# Patient Record
Sex: MALE | Race: WHITE | URBAN - METROPOLITAN AREA
[De-identification: names, ages, dates, MRNs, and addresses within clinical notes are randomized per-mention and may not be internally consistent; named-entity substitution may affect disease eponyms.]

---

## 2018-03-14 ENCOUNTER — APPOINTMENT (OUTPATIENT)
Dept: ULTRASOUND IMAGING | Facility: CLINIC | Age: 38
End: 2018-03-14
Attending: EMERGENCY MEDICINE
Payer: COMMERCIAL

## 2018-03-14 ENCOUNTER — HOSPITAL ENCOUNTER (OUTPATIENT)
Facility: CLINIC | Age: 38
Setting detail: OBSERVATION
Discharge: PSYCHIATRIC OR SUBSTANCE USE FACILITY | End: 2018-03-16
Attending: EMERGENCY MEDICINE | Admitting: HOSPITALIST
Payer: COMMERCIAL

## 2018-03-14 DIAGNOSIS — K82.8 GALLBLADDER SLUDGE: ICD-10-CM

## 2018-03-14 DIAGNOSIS — K76.6 PORTAL HYPERTENSION (H): ICD-10-CM

## 2018-03-14 DIAGNOSIS — E80.6 HYPERBILIRUBINEMIA: ICD-10-CM

## 2018-03-14 DIAGNOSIS — F10.239 ALCOHOL DEPENDENCE WITH WITHDRAWAL WITH COMPLICATION (H): ICD-10-CM

## 2018-03-14 DIAGNOSIS — K70.31 ALCOHOLIC CIRRHOSIS OF LIVER WITH ASCITES (H): Primary | ICD-10-CM

## 2018-03-14 DIAGNOSIS — F10.10 ALCOHOL ABUSE: ICD-10-CM

## 2018-03-14 LAB
ALBUMIN SERPL-MCNC: 2.9 G/DL (ref 3.4–5)
ALP SERPL-CCNC: 180 U/L (ref 40–150)
ALT SERPL W P-5'-P-CCNC: 59 U/L (ref 0–70)
AMPHETAMINES UR QL SCN: NEGATIVE
ANION GAP SERPL CALCULATED.3IONS-SCNC: 11 MMOL/L (ref 3–14)
AST SERPL W P-5'-P-CCNC: 211 U/L (ref 0–45)
BARBITURATES UR QL: NEGATIVE
BASOPHILS # BLD AUTO: 0.1 10E9/L (ref 0–0.2)
BASOPHILS NFR BLD AUTO: 0.7 %
BENZODIAZ UR QL: NEGATIVE
BILIRUB SERPL-MCNC: 14.4 MG/DL (ref 0.2–1.3)
BUN SERPL-MCNC: 4 MG/DL (ref 7–30)
CALCIUM SERPL-MCNC: 8.1 MG/DL (ref 8.5–10.1)
CANNABINOIDS UR QL SCN: NEGATIVE
CHLORIDE SERPL-SCNC: 106 MMOL/L (ref 94–109)
CO2 SERPL-SCNC: 23 MMOL/L (ref 20–32)
COCAINE UR QL: NEGATIVE
CREAT SERPL-MCNC: 0.54 MG/DL (ref 0.66–1.25)
DIFFERENTIAL METHOD BLD: ABNORMAL
EOSINOPHIL # BLD AUTO: 0.2 10E9/L (ref 0–0.7)
EOSINOPHIL NFR BLD AUTO: 1.8 %
ERYTHROCYTE [DISTWIDTH] IN BLOOD BY AUTOMATED COUNT: 16.2 % (ref 10–15)
ETHANOL UR QL SCN: POSITIVE
GFR SERPL CREATININE-BSD FRML MDRD: >90 ML/MIN/1.7M2
GLUCOSE SERPL-MCNC: 117 MG/DL (ref 70–99)
HCT VFR BLD AUTO: 35.8 % (ref 40–53)
HGB BLD-MCNC: 12 G/DL (ref 13.3–17.7)
IMM GRANULOCYTES # BLD: 0.1 10E9/L (ref 0–0.4)
IMM GRANULOCYTES NFR BLD: 0.6 %
INR PPP: 1.78 (ref 0.86–1.14)
LIPASE SERPL-CCNC: 479 U/L (ref 73–393)
LYMPHOCYTES # BLD AUTO: 1 10E9/L (ref 0.8–5.3)
LYMPHOCYTES NFR BLD AUTO: 11.9 %
MCH RBC QN AUTO: 36.1 PG (ref 26.5–33)
MCHC RBC AUTO-ENTMCNC: 33.5 G/DL (ref 31.5–36.5)
MCV RBC AUTO: 108 FL (ref 78–100)
MONOCYTES # BLD AUTO: 0.9 10E9/L (ref 0–1.3)
MONOCYTES NFR BLD AUTO: 10.7 %
NEUTROPHILS # BLD AUTO: 6 10E9/L (ref 1.6–8.3)
NEUTROPHILS NFR BLD AUTO: 74.3 %
NRBC # BLD AUTO: 0 10*3/UL
NRBC BLD AUTO-RTO: 0 /100
OPIATES UR QL SCN: NEGATIVE
PLATELET # BLD AUTO: 79 10E9/L (ref 150–450)
POTASSIUM SERPL-SCNC: 4 MMOL/L (ref 3.4–5.3)
PROT SERPL-MCNC: 8.2 G/DL (ref 6.8–8.8)
RBC # BLD AUTO: 3.32 10E12/L (ref 4.4–5.9)
SODIUM SERPL-SCNC: 140 MMOL/L (ref 133–144)
WBC # BLD AUTO: 8.1 10E9/L (ref 4–11)

## 2018-03-14 PROCEDURE — 25000128 H RX IP 250 OP 636: Performed by: EMERGENCY MEDICINE

## 2018-03-14 PROCEDURE — 93975 VASCULAR STUDY: CPT | Mod: TC

## 2018-03-14 PROCEDURE — 83690 ASSAY OF LIPASE: CPT | Performed by: EMERGENCY MEDICINE

## 2018-03-14 PROCEDURE — 96360 HYDRATION IV INFUSION INIT: CPT | Performed by: EMERGENCY MEDICINE

## 2018-03-14 PROCEDURE — 96361 HYDRATE IV INFUSION ADD-ON: CPT | Performed by: EMERGENCY MEDICINE

## 2018-03-14 PROCEDURE — 80307 DRUG TEST PRSMV CHEM ANLYZR: CPT | Performed by: FAMILY MEDICINE

## 2018-03-14 PROCEDURE — 80053 COMPREHEN METABOLIC PANEL: CPT | Performed by: EMERGENCY MEDICINE

## 2018-03-14 PROCEDURE — 25000132 ZZH RX MED GY IP 250 OP 250 PS 637: Performed by: EMERGENCY MEDICINE

## 2018-03-14 PROCEDURE — 85025 COMPLETE CBC W/AUTO DIFF WBC: CPT | Performed by: EMERGENCY MEDICINE

## 2018-03-14 PROCEDURE — 99285 EMERGENCY DEPT VISIT HI MDM: CPT | Mod: 25 | Performed by: EMERGENCY MEDICINE

## 2018-03-14 PROCEDURE — 80320 DRUG SCREEN QUANTALCOHOLS: CPT | Performed by: FAMILY MEDICINE

## 2018-03-14 PROCEDURE — 82075 ASSAY OF BREATH ETHANOL: CPT | Performed by: EMERGENCY MEDICINE

## 2018-03-14 PROCEDURE — 85610 PROTHROMBIN TIME: CPT | Performed by: INTERNAL MEDICINE

## 2018-03-14 PROCEDURE — 99285 EMERGENCY DEPT VISIT HI MDM: CPT | Mod: Z6 | Performed by: EMERGENCY MEDICINE

## 2018-03-14 RX ORDER — MULTIPLE VITAMINS W/ MINERALS TAB 9MG-400MCG
1 TAB ORAL ONCE
Status: COMPLETED | OUTPATIENT
Start: 2018-03-14 | End: 2018-03-14

## 2018-03-14 RX ORDER — FOLIC ACID 1 MG/1
1 TABLET ORAL ONCE
Status: COMPLETED | OUTPATIENT
Start: 2018-03-14 | End: 2018-03-14

## 2018-03-14 RX ORDER — LANOLIN ALCOHOL/MO/W.PET/CERES
100 CREAM (GRAM) TOPICAL ONCE
Status: COMPLETED | OUTPATIENT
Start: 2018-03-14 | End: 2018-03-14

## 2018-03-14 RX ADMIN — MULTIPLE VITAMINS W/ MINERALS TAB 1 TABLET: TAB at 18:44

## 2018-03-14 RX ADMIN — Medication 100 MG: at 18:43

## 2018-03-14 RX ADMIN — SODIUM CHLORIDE 1000 ML: 9 INJECTION, SOLUTION INTRAVENOUS at 18:45

## 2018-03-14 RX ADMIN — FOLIC ACID 1 MG: 1 TABLET ORAL at 18:44

## 2018-03-14 ASSESSMENT — ENCOUNTER SYMPTOMS
ARTHRALGIAS: 0
VOMITING: 0
SHORTNESS OF BREATH: 0
ABDOMINAL PAIN: 1
EYES NEGATIVE: 1
SEIZURES: 0
NECK STIFFNESS: 0
HEADACHES: 0
TREMORS: 1
FEVER: 0
NAUSEA: 0
PSYCHIATRIC NEGATIVE: 1

## 2018-03-14 NOTE — IP AVS SNAPSHOT
[address] [address]    Phone:  [phone]                                       After Visit Summary   3/14/2018    Sylvain Pitts    MRN: [id number]           After Visit Summary Signature Page     I have received my discharge instructions, and my questions have been answered. I have discussed any challenges I see with this plan with the nurse or doctor.    ..........................................................................................................................................  Patient/Patient Representative Signature      ..........................................................................................................................................  Patient Representative Print Name and Relationship to Patient    ..................................................               ................................................  Date                                            Time    ..........................................................................................................................................  Reviewed by Signature/Title    ...................................................              ..............................................  Date                                                            Time

## 2018-03-14 NOTE — ED PROVIDER NOTES
[facility] EMERGENCY DEPARTMENT ([facility])    3/14/18       History     Chief Complaint   Patient presents with     Addiction Problem     alcohol every day, 2-16shots/day, last drink last night.     The history is provided by the patient and a relative. A  was used (Norwegian  and sister).     Sylvain Pitts is a 38 year old Norwegian-speaking male with a medical history significant for alcohol abuse, kidney stones and anxiety who presents with his sister for evaluation of an alcohol addiction.  Patient is currently employed as a .  Patient's sister provided history and translated for the patient.  Patient reports that he has been drinking approximately 2-10 shots of vodka or whiskey for approximately 1 year.  Patient states that prior to this he was 7 months sober after a year of an alcohol addiction.  Patient states that prior to his sobriety he was having problems with his liver and kidneys, so he went through detox and treatment.  Patient's last drink was last night and he presents today for detox as he is ready to stop.  Patient states that he is currently having some discomfort in his abdomen, which he states is most notable in the right abdomen.  He also reports that he is having withdrawal shakes.  He denies any history of withdrawal seizures. He denies vomiting or fevers.  The patient and the patient's sister do note that his eyes are yellowish in color.  The sister reports that this has been on and off since summer 2017.  She reports that it will get worse then resolve, then return again.  Patient today presented to his PCP as he is ready to quit drinking, he was advised to come here to be admitted to detox.  Patient denies any suicidal ideation.  Patient is currently living with his parents. He denies suicidal ideation.     Social: here with sister, works as , lives with parents    I have reviewed the Medications, Allergies, Past Medical and Surgical  History, and Social History in the [facility] system.    Past Medical History:   Diagnosis Date     Anxiety      Kidney stones        Past Surgical History:   Procedure Laterality Date     HEAD & NECK SURGERY      head surgery when he was 14 years old.       No family history on file.    Social History   Substance Use Topics     Smoking status: Light Tobacco Smoker     Smokeless tobacco: Never Used     Alcohol use Yes      Comment: every day       No current facility-administered medications for this encounter.      No current outpatient prescriptions on file.      No Known Allergies       Review of Systems   Constitutional: Negative for fever.   Eyes: Negative.         Positive for jaundice   Respiratory: Negative for shortness of breath.    Cardiovascular: Negative for chest pain.   Gastrointestinal: Positive for abdominal pain (discomfort). Negative for nausea and vomiting.   Genitourinary: Negative.    Musculoskeletal: Negative for arthralgias and neck stiffness.   Skin: Negative for rash.   Neurological: Positive for tremors. Negative for seizures and headaches.   Psychiatric/Behavioral: Negative.    All other systems reviewed and are negative.      Physical Exam   BP: 139/85  Pulse: 95  Temp: 97.5  F (36.4  C)  Resp: 16  Weight: 101.2 kg (223 lb 1.6 oz)  SpO2: 98 %      Physical Exam  Physical Exam   Constitutional:   well nourished, well developed, resting comfortably   HENT:   Head: Normocephalic and atraumatic.   Eyes: Conjunctivae are normal. Pupils are equal, round, and reactive to light. sclera are icteric   pharynx has no erythema or exudate, mucous membranes are moist  Neck:   no adenopathy, no bony tenderness  Cardiovascular: regular rate and rhythm without murmurs or gallops  Pulmonary/Chest: Clear to auscultation bilaterally, with no wheezes or retractions. No respiratory distress.  GI: Soft with good bowel sounds.  Diffusely tender, non-distended, with no guarding, no rebound, no peritoneal signs.    Back:  No bony or CVA tenderness   Musculoskeletal:  no edema or clubbing   Skin: Skin is warm and dry. No rash noted. slightly jaundic  Neurological: alert and oriented to person, place, and time. Nonfocal exam, slightly tremulous  Psychiatric:  normal mood and affect. Answering questions appropriately   ED Course   6:12 PM  The patient was seen and examined by Glenys Jain MD in Room ED08.     ED Course     Procedures             Critical Care time:  none           Results for orders placed or performed during the hospital encounter of 03/14/18 (from the past 24 hour(s))   CBC with platelets differential   Result Value Ref Range    WBC 8.1 4.0 - 11.0 10e9/L    RBC Count 3.32 (L) 4.4 - 5.9 10e12/L    Hemoglobin 12.0 (L) 13.3 - 17.7 g/dL    Hematocrit 35.8 (L) 40.0 - 53.0 %     (H) 78 - 100 fl    MCH 36.1 (H) 26.5 - 33.0 pg    MCHC 33.5 31.5 - 36.5 g/dL    RDW 16.2 (H) 10.0 - 15.0 %    Platelet Count 79 (L) 150 - 450 10e9/L    Diff Method Automated Method     % Neutrophils 74.3 %    % Lymphocytes 11.9 %    % Monocytes 10.7 %    % Eosinophils 1.8 %    % Basophils 0.7 %    % Immature Granulocytes 0.6 %    Nucleated RBCs 0 0 /100    Absolute Neutrophil 6.0 1.6 - 8.3 10e9/L    Absolute Lymphocytes 1.0 0.8 - 5.3 10e9/L    Absolute Monocytes 0.9 0.0 - 1.3 10e9/L    Absolute Eosinophils 0.2 0.0 - 0.7 10e9/L    Absolute Basophils 0.1 0.0 - 0.2 10e9/L    Abs Immature Granulocytes 0.1 0 - 0.4 10e9/L    Absolute Nucleated RBC 0.0    Comprehensive metabolic panel   Result Value Ref Range    Sodium 140 133 - 144 mmol/L    Potassium 4.0 3.4 - 5.3 mmol/L    Chloride 106 94 - 109 mmol/L    Carbon Dioxide 23 20 - 32 mmol/L    Anion Gap 11 3 - 14 mmol/L    Glucose 117 (H) 70 - 99 mg/dL    Urea Nitrogen 4 (L) 7 - 30 mg/dL    Creatinine 0.54 (L) 0.66 - 1.25 mg/dL    GFR Estimate >90 >60 mL/min/1.7m2    GFR Estimate If Black >90 >60 mL/min/1.7m2    Calcium 8.1 (L) 8.5 - 10.1 mg/dL    Bilirubin Total 14.4 (H) 0.2 - 1.3 mg/dL     Albumin 2.9 (L) 3.4 - 5.0 g/dL    Protein Total 8.2 6.8 - 8.8 g/dL    Alkaline Phosphatase 180 (H) 40 - 150 U/L    ALT 59 0 - 70 U/L     (H) 0 - 45 U/L   Lipase   Result Value Ref Range    Lipase 479 (H) 73 - 393 U/L   US Abdomen Complete w Doppler Complete    Narrative    US ABDOMEN COMPLETE WITH DOPPLER COMPLETE   3/14/2018 9:02 PM     COMPARISON: None.    HISTORY:  RUQ pain and abdominal distention.      FINDINGS: The liver demonstrates a coarse, hyperechoic echotexture  with a lobulated margin consistent with cirrhosis. There is retrograde  flow in the splenic, main portal and right portal veins. There is  antegrade flow in the left portal vein. The middle, left and right  hepatic veins are patent with antegrade flow. There is a small amount  of ascites around the liver.    The gallbladder is filled with fluid and sludge. There is uniform  gallbladder wall thickening likely related to ascites. There is no  pericholecystic fluid. There is no sonographic Cullen's sign. The  common duct is normal in size at 3 mm in diameter.    The pancreas is unremarkable.    The spleen is enlarged at 17.3 cm in length.    The right kidney measures 12.9 cm in length. The left kidney measures  14.8 cm in length. Renal echotexture bilaterally is within normal  limits. There is no hydronephrosis on either side.    The abdominal aorta and inferior vena cava are visualized.      Impression    IMPRESSION:  1. Cirrhotic appearing liver.  2. Findings consistent with portal hypertension including retrograde  flow in the splenic, main portal and right portal veins.  3. Sludge in the gallbladder. No evidence for acute cholecystitis.  4. Thickening of the gallbladder wall likely related to the presence  of ascites.  5. Splenomegaly.      Labs Ordered and Resulted from Time of ED Arrival Up to the Time of Departure from the ED   CBC WITH PLATELETS DIFFERENTIAL - Abnormal; Notable for the following:        Result Value    RBC Count 3.32  (*)     Hemoglobin 12.0 (*)     Hematocrit 35.8 (*)      (*)     MCH 36.1 (*)     RDW 16.2 (*)     Platelet Count 79 (*)     All other components within normal limits   COMPREHENSIVE METABOLIC PANEL - Abnormal; Notable for the following:     Glucose 117 (*)     Urea Nitrogen 4 (*)     Creatinine 0.54 (*)     Calcium 8.1 (*)     Bilirubin Total 14.4 (*)     Albumin 2.9 (*)     Alkaline Phosphatase 180 (*)      (*)     All other components within normal limits   LIPASE - Abnormal; Notable for the following:     Lipase 479 (*)     All other components within normal limits   DRUG ABUSE SCREEN 6 CHEM DEP URINE ([facility])   ALCOHOL BREATH TEST POCT            Assessments & Plan (with Medical Decision Making)       I have reviewed the nursing notes.  Emergency Department course:  The patient was seen and examined at 1813 pm.  I treated him with a liter of normal saline bolus IV, thiamine, folate, and multivitamins p.o.  Laboratory studies are notable for a significantly elevated bilirubin of 14.4.  The patient has an elevated alkaline phosphatase of 180 and an AST of 211, consistent with alcohol abuse.  CBC shows thrombocytopenia, with a platelet count of 79,000.  Lipase is elevated at 479. Urine tox screen is positive for alcohol. INR is elevated at 1.78.    The patient is a 38-year-old male who is an alcoholic, here with acute alcohol withdrawal.  He is jaundice with hyperbilirubinemia.  I had initially intended to admit the patient to the  hospitalist service for further evaluation and treatment.  I spoke with Dr. Ibrahim regarding admission.  Dr. Ibrahim requested that I obtain an right upper quadrant ultrasound on this patient prior to admitting the patient here at [facility].   Abdominal ultrasound with doppler shows IMPRESSION:  1. Cirrhotic appearing liver.  2. Findings consistent with portal hypertension including retrograde  flow in the splenic, main portal and right portal veins.  3. Sludge in the  gallbladder. No evidence for acute cholecystitis.  4. Thickening of the gallbladder wall likely related to the presence  of ascites.  5. Splenomegaly.    Dr. Ibrahim felt the patient would be better served being admitted to the [facility], as GI and surgery are available at the [facility].  I discussed these findings with the patient and his family.  A Ivorian  was used to facilitate this discussion .     The patient is a 38-year-old alcoholic with alcohol withdrawal, hyperbilirubinemia, portal hypertension and sludge in the gallbladder.  He will now be admitted to the medicine service on the [facility].  I spoke with Dr. Walsh of Medicine regarding admission.   I have reviewed the findings, diagnosis, plan and need for follow up with the patient.    New Prescriptions    No medications on file       Final diagnoses:   Alcohol dependence with withdrawal with complication (H)   Hyperbilirubinemia   Portal hypertension (H)   Gallbladder sludge     I, Kevin Haskins, am serving as a trained medical scribe to document services personally performed by Glenys Jain MD, based on the provider's statements to me.   I, Glenys Jain MD, was physically present and have reviewed and verified the accuracy of this note documented by Kevin Haskins.  This note was created in part by the use of Dragon voice recognition dictation system. Inadvertent grammatical errors and typographical errors may still exist.  Glenys Jain MD      3/14/2018   [facility] [place], EMERGENCY DEPARTMENT     Glenys Jain MD  03/14/18 3787

## 2018-03-14 NOTE — IP AVS SNAPSHOT
` ` Patient Information     Patient Name Sex     Sylvain Pitts ([id number]) Male 1980       Room Bed    5214 5214-02      Patient Demographics     Address Phone    [address]  [facility] [address] [phone] (Home)  [phone] (Mobile)      Patient Ethnicity & Race     Ethnic Group Patient Race    Other White      Emergency Contact(s)     Name Relation Home Work Mobile    Namrata Pitts Sister [phone]      Documents on File        Status Date Received Description       Documents for the Patient    Consent for EHR Access Received 18     Insurance Card Received 18 [facility]    External Medication Information Consent       Patient ID Received 18     [facility] Specified Other       Consent for Services/Privacy Notice - Hospital/Clinic Received 18     Privacy Notice - [place] Received 18     Care Everywhere Prospective Auth Received 18     Consent for Services - [facility]          Documents for the Encounter    CMS IM for Patient Signature         Admission Information     Attending Provider Admitting Provider Admission Type Admission Date/Time    Joe Garay MD Muthyala, Brian Kirti, MD Emergency 18  1803    Discharge Date Hospital Service Auth/Cert Status Service Area     [facility] SERVICES    Unit Room/Bed Admission Status       UU U5A 5214/5214- Admission (Confirmed)       Admission     Complaint    Alcohol abuse, Cirrhosis of liver with ascites (H), Cirrhosis of liver with ascites (H)      Hospital Account     Name Acct ID Class Status Primary Coverage    Sylvain Pitts [id number] Observation Open [facility]            Guarantor Account (for Hospital Account #[id number])     Name Relation to Pt Service Area Active? Acct Type    Sylvain Pitts Self FCS Yes Personal/Family    Address Phone          [address]  [place] [address] [phone](H)               Coverage Information (for Hospital Account #[id number])     F/O Payor/Plan Precert #    ALEKSANDR/ALEKSANDR RODRIGUEZ     Subscriber Subscriber #    Sylvain Pitts [id number]    Address Phone    [address]  [place] [address] [phone]

## 2018-03-14 NOTE — IP AVS SNAPSHOT
MRN:[id number]                      After Visit Summary   3/14/2018    Sylvain Pitts    MRN: [id number]           Thank you!     Thank you for choosing [facility] for your care. Our goal is always to provide you with excellent care. Hearing back from our patients is one way we can continue to improve our services. Please take a few minutes to complete the written survey that you may receive in the mail after you visit with us. Thank you!        Patient Information     Date Of Birth          1980        Designated Caregiver       Most Recent Value    Caregiver    Will someone help with your care after discharge? yes    Name of designated caregiver Namrata (sister)    Phone number of caregiver [phone]    Caregiver address [id number] [facility]  [address]      About your hospital stay     You were admitted on:  March 15, 2018 You last received care in the:  [address] [facility]    You were discharged on:  March 16, 2018        Reason for your hospital stay       Cirrhosis, alcohol cessation                  Who to Call     For medical emergencies, please call 911.  For non-urgent questions about your medical care, please call your primary care provider or clinic, [phone]          Attending Provider     Provider Specialty    Glenys Jain MD Emergency Medicine    [place], Juan Pablo Avalos MD Internal Medicine    Jerod Kumar MD Internal Medicine    Joe Garay MD Internal Medicine       Primary Care Provider Office Phone # Fax #    [facility] [phone]      After Care Instructions     Activity - Up ad lana           Advance Diet as Tolerated       Follow this diet upon discharge: Orders Placed This Encounter      Regular Diet Adult            Follow Up (Transitional Care Management)       Follow up with primary care provider, [facility], within 7 days for hospital follow- up.  The following  labs/tests are recommended: Hepatitis A vaccination.      Appointments on [place] and/or [facility] (with [facility] or [facility] provider or service). Call [phone] if you haven't heard regarding these appointments within 7 days of discharge.            General info for SNF       Length of Stay Estimate: Short Term Care: Estimated # of Days <30  Condition at Discharge: Stable  Level of care:board and care  Rehabilitation Potential: Excellent  Admission H&P remains valid and up-to-date: Yes  Recent Chemotherapy: N/A  Use Nursing Home Standing Orders: N/A            Mantoux instructions       Give two-step Mantoux (PPD) Per Facility Policy Yes            Seizure precautions                 Your next 10 appointments already scheduled     Apr 02, 2018  9:00 AM CDT   LAB with  LAB   [facility] ([facility])    [address]  1st Floor  [place] [address] [phone]           Please do not eat 10-12 hours before your appointment if you are coming in fasting for labs on lipids, cholesterol, or glucose (sugar). This does not apply to pregnant women. Water, hot tea and black coffee (with nothing added) are okay. Do not drink other fluids, diet soda or chew gum.            Apr 02, 2018 10:00 AM CDT   (Arrive by 9:45 AM)   New General Liver with Marcela Monge MD   [facility] Hepatology ([facility])    [address] [place] [address]  [address]  [facility] [address] [phone]              Additional Services     GASTROENTEROLOGY ADULT REF CONSULT ONLY       Preferred Location: [facility] [phone]      Please be aware that coverage of these services is subject to the terms and limitations of your health insurance plan.  Call member services at your health plan with any benefit or coverage questions.  Any procedures must be performed at a [facility] facility OR coordinated by your clinic's referral office.    Please bring the following with you to  "your appointment:    (1) Any X-Rays, CTs or MRIs which have been performed.  Contact the facility where they were done to arrange for  prior to your scheduled appointment.    (2) List of current medications   (3) This referral request   (4) Any documents/labs given to you for this referral                  Pending Results     No orders found from 3/12/2018 to 3/15/2018.            Statement of Approval     Ordered          03/15/18 1438  I have reviewed and agree with all the recommendations and orders detailed in this document.  EFFECTIVE NOW     Approved and electronically signed by:  Cheikh Raymond MD           03/15/18 1413  I have reviewed and agree with all the recommendations and orders detailed in this document.  EFFECTIVE NOW     Approved and electronically signed by:  Cheikh Raymond MD             Admission Information     Date & Time Provider Department Dept. Phone    3/14/2018 Joe Garay MD [address] [facility] [phone]      Your Vitals Were     Blood Pressure Pulse Temperature Respirations Weight Pulse Oximetry    126/75 (BP Location: Right arm) 115 99.3  F (37.4  C) (Oral) 16 101.3 kg (223 lb 4.8 oz) 94%      MyChart Information     [employer] lets you send messages to your doctor, view your test results, renew your prescriptions, schedule appointments and more. To sign up, go to [email or web address] . Click on \"Log in\" on the left side of the screen, which will take you to the Welcome page. Then click on \"Sign up Now\" on the right side of the page.     You will be asked to enter the access code listed below, as well as some personal information. Please follow the directions to create your username and password.     Your access code is: [email or web address]  Expires: 2018 11:16 AM     Your access code will  in 90 days. If you need help or a new code, please call your [facility] clinic or [phone].        Care EveryWhere ID     This is your Care EveryWhere ID. This could be " used by other organizations to access your [facility] medical records  [id number]        Equal Access to Services     NANI OSUNA : Hadii milly Odonnell, wajanelleda lusammyadaha, qaybta kaalmada lizsiminjose, loretta barbosajessikathleen gibson. So [facility] [phone].    ATENCIÓN: Si habla español, tiene a henriquez disposición servicios gratuitos de asistencia lingüística. Llame al [phone].    We comply with applicable federal civil rights laws and [place] laws. We do not discriminate on the basis of race, color, national origin, age, disability, sex, sexual orientation, or gender identity.               Review of your medicines      START taking        Dose / Directions    cloNIDine 0.1 MG tablet   Commonly known as:  CATAPRES   Used for:  Alcohol dependence with withdrawal with complication (H)        Dose:  0.1 mg   Take 1 tablet (0.1 mg) by mouth 2 times daily for 5 days   Quantity:  10 tablet   Refills:  0       gabapentin 800 MG tablet   Commonly known as:  NEURONTIN   Used for:  Alcohol dependence with withdrawal with complication (H)        Dose:  800 mg   Take 1 tablet (800 mg) by mouth 3 times daily for 5 days   Quantity:  15 tablet   Refills:  0            Where to get your medicines      These medications were sent to [facility] [place], [place] - [address] [address]     Phone:  [phone]     cloNIDine 0.1 MG tablet    gabapentin 800 MG tablet                Protect others around you: Learn how to safely use, store and throw away your medicines at www.disposemymeds.org.             Medication List: This is a list of all your medications and when to take them. Check marks below indicate your daily home schedule. Keep this list as a reference.      Medications           Morning Afternoon Evening Bedtime As Needed    cloNIDine 0.1 MG tablet   Commonly known as:  CATAPRES   Take 1 tablet (0.1 mg) by mouth 2 times daily for 5 days   Last  time this was given:  0.1 mg on 3/16/2018  9:59 AM                                gabapentin 800 MG tablet   Commonly known as:  NEURONTIN   Take 1 tablet (800 mg) by mouth 3 times daily for 5 days   Last time this was given:  800 mg on 3/16/2018  8:37 AM

## 2018-03-15 DIAGNOSIS — K74.60 CIRRHOSIS OF LIVER WITH ASCITES (H): Primary | ICD-10-CM

## 2018-03-15 DIAGNOSIS — R18.8 CIRRHOSIS OF LIVER WITH ASCITES (H): Primary | ICD-10-CM

## 2018-03-15 PROBLEM — F10.10 ALCOHOL ABUSE: Status: ACTIVE | Noted: 2018-03-15

## 2018-03-15 LAB
ALBUMIN SERPL-MCNC: 2.5 G/DL (ref 3.4–5)
ALP SERPL-CCNC: 157 U/L (ref 40–150)
ALT SERPL W P-5'-P-CCNC: 59 U/L (ref 0–70)
ANION GAP SERPL CALCULATED.3IONS-SCNC: 14 MMOL/L (ref 3–14)
AST SERPL W P-5'-P-CCNC: 199 U/L (ref 0–45)
BILIRUB SERPL-MCNC: 14.4 MG/DL (ref 0.2–1.3)
BUN SERPL-MCNC: 4 MG/DL (ref 7–30)
CALCIUM SERPL-MCNC: 8 MG/DL (ref 8.5–10.1)
CHLORIDE SERPL-SCNC: 106 MMOL/L (ref 94–109)
CO2 SERPL-SCNC: 19 MMOL/L (ref 20–32)
CREAT SERPL-MCNC: 0.53 MG/DL (ref 0.66–1.25)
ERYTHROCYTE [DISTWIDTH] IN BLOOD BY AUTOMATED COUNT: 16.9 % (ref 10–15)
GFR SERPL CREATININE-BSD FRML MDRD: >90 ML/MIN/1.7M2
GLUCOSE SERPL-MCNC: 107 MG/DL (ref 70–99)
HAV IGM SERPL QL IA: ABNORMAL
HBV SURFACE AB SERPL IA-ACNC: 0.84 M[IU]/ML
HBV SURFACE AG SERPL QL IA: NONREACTIVE
HCT VFR BLD AUTO: 35 % (ref 40–53)
HCV AB SERPL QL IA: NONREACTIVE
HGB BLD-MCNC: 11.2 G/DL (ref 13.3–17.7)
INR PPP: 1.74 (ref 0.86–1.14)
MCH RBC QN AUTO: 35.8 PG (ref 26.5–33)
MCHC RBC AUTO-ENTMCNC: 32 G/DL (ref 31.5–36.5)
MCV RBC AUTO: 112 FL (ref 78–100)
PLATELET # BLD AUTO: 66 10E9/L (ref 150–450)
POTASSIUM SERPL-SCNC: 3.7 MMOL/L (ref 3.4–5.3)
PROT SERPL-MCNC: 7.3 G/DL (ref 6.8–8.8)
RBC # BLD AUTO: 3.13 10E12/L (ref 4.4–5.9)
SODIUM SERPL-SCNC: 139 MMOL/L (ref 133–144)
VIT B12 SERPL-MCNC: 1146 PG/ML (ref 193–986)
WBC # BLD AUTO: 6.6 10E9/L (ref 4–11)

## 2018-03-15 PROCEDURE — 80053 COMPREHEN METABOLIC PANEL: CPT | Performed by: STUDENT IN AN ORGANIZED HEALTH CARE EDUCATION/TRAINING PROGRAM

## 2018-03-15 PROCEDURE — 25000132 ZZH RX MED GY IP 250 OP 250 PS 637: Performed by: MARRIAGE & FAMILY THERAPIST

## 2018-03-15 PROCEDURE — 85610 PROTHROMBIN TIME: CPT | Performed by: STUDENT IN AN ORGANIZED HEALTH CARE EDUCATION/TRAINING PROGRAM

## 2018-03-15 PROCEDURE — 86803 HEPATITIS C AB TEST: CPT

## 2018-03-15 PROCEDURE — 99218 ZZC INITIAL OBSERVATION CARE,LEVL I: CPT | Mod: AI | Performed by: HOSPITALIST

## 2018-03-15 PROCEDURE — G0378 HOSPITAL OBSERVATION PER HR: HCPCS

## 2018-03-15 PROCEDURE — 25000132 ZZH RX MED GY IP 250 OP 250 PS 637: Performed by: STUDENT IN AN ORGANIZED HEALTH CARE EDUCATION/TRAINING PROGRAM

## 2018-03-15 PROCEDURE — 82607 VITAMIN B-12: CPT | Performed by: STUDENT IN AN ORGANIZED HEALTH CARE EDUCATION/TRAINING PROGRAM

## 2018-03-15 PROCEDURE — 86706 HEP B SURFACE ANTIBODY: CPT

## 2018-03-15 PROCEDURE — 36415 COLL VENOUS BLD VENIPUNCTURE: CPT

## 2018-03-15 PROCEDURE — 86709 HEPATITIS A IGM ANTIBODY: CPT

## 2018-03-15 PROCEDURE — 25000132 ZZH RX MED GY IP 250 OP 250 PS 637

## 2018-03-15 PROCEDURE — 36415 COLL VENOUS BLD VENIPUNCTURE: CPT | Performed by: STUDENT IN AN ORGANIZED HEALTH CARE EDUCATION/TRAINING PROGRAM

## 2018-03-15 PROCEDURE — 87340 HEPATITIS B SURFACE AG IA: CPT

## 2018-03-15 PROCEDURE — 85027 COMPLETE CBC AUTOMATED: CPT | Performed by: STUDENT IN AN ORGANIZED HEALTH CARE EDUCATION/TRAINING PROGRAM

## 2018-03-15 RX ORDER — CLONIDINE HYDROCHLORIDE 0.1 MG/1
0.1 TABLET ORAL 2 TIMES DAILY
Status: DISCONTINUED | OUTPATIENT
Start: 2018-03-15 | End: 2018-03-15

## 2018-03-15 RX ORDER — LORAZEPAM 1 MG/1
1-4 TABLET ORAL EVERY 30 MIN PRN
Status: DISCONTINUED | OUTPATIENT
Start: 2018-03-15 | End: 2018-03-16 | Stop reason: HOSPADM

## 2018-03-15 RX ORDER — LANOLIN ALCOHOL/MO/W.PET/CERES
100 CREAM (GRAM) TOPICAL DAILY
Status: DISCONTINUED | OUTPATIENT
Start: 2018-03-15 | End: 2018-03-16 | Stop reason: HOSPADM

## 2018-03-15 RX ORDER — GABAPENTIN 800 MG/1
800 TABLET ORAL 3 TIMES DAILY
Status: DISCONTINUED | OUTPATIENT
Start: 2018-03-15 | End: 2018-03-16 | Stop reason: HOSPADM

## 2018-03-15 RX ORDER — FOLIC ACID 1 MG/1
1 TABLET ORAL DAILY
Status: DISCONTINUED | OUTPATIENT
Start: 2018-03-15 | End: 2018-03-16 | Stop reason: HOSPADM

## 2018-03-15 RX ORDER — NALOXONE HYDROCHLORIDE 0.4 MG/ML
.1-.4 INJECTION, SOLUTION INTRAMUSCULAR; INTRAVENOUS; SUBCUTANEOUS
Status: DISCONTINUED | OUTPATIENT
Start: 2018-03-15 | End: 2018-03-16 | Stop reason: HOSPADM

## 2018-03-15 RX ADMIN — LORAZEPAM 1 MG: 1 TABLET ORAL at 09:01

## 2018-03-15 RX ADMIN — LORAZEPAM 1 MG: 1 TABLET ORAL at 20:28

## 2018-03-15 RX ADMIN — LORAZEPAM 2 MG: 1 TABLET ORAL at 22:46

## 2018-03-15 RX ADMIN — FOLIC ACID 1 MG: 1 TABLET ORAL at 09:01

## 2018-03-15 RX ADMIN — Medication 1 MG: at 03:18

## 2018-03-15 RX ADMIN — Medication 100 MG: at 09:01

## 2018-03-15 RX ADMIN — GABAPENTIN 800 MG: 800 TABLET, FILM COATED ORAL at 20:28

## 2018-03-15 NOTE — PROGRESS NOTES
Social Work: Assessment with Discharge Plan    Patient Name:  Sylvain Pitts  :  1980  Age:  38 year old  MRN:  [id number]  Risk/Complexity Score:  Filed Complexity Screen Score: 4  Completed assessment with:  Patient, medicine, chart review    Presenting Information   Reason for Referral:  Substance abuse concerns  Date of Intake:  March 15, 2018  Referral Source:  Physician  Decision Maker:  self  Alternate Decision Maker:  NOK   Health Care Directive:  Declined completing  Living Situation:  Apartment reports his parents are currently living with him  Previous Functional Status:  Independent  Patient and family understanding of hospitalization:  Alcohol addiction  Cultural/Language/Spiritual Considerations:    Adjustment to Illness:  Difficult to assess, he was difficult to orient needing some questions asked 3 times in different ways for comprehension (i.e. Do you live alone, does anyone live with you, do you live by yourself). He denied having ever participated in alcohol treatment or detox prior to this admission. He was agreeable to a transfer to detox.     Physical Health  Reason for Admission:    1. Alcohol dependence with withdrawal with complication (H)    2. Hyperbilirubinemia    3. Portal hypertension (H)    4. Gallbladder sludge      Services Needed/Recommended:  Other:  CD TX vs Detox     Mental Health/Chemical Dependency  Diagnosis:  Alcohol addiction  Support/Services in Place:  none  Services Needed/Recommended:  Detox pending his withdrawal status when medically clear to DC VS Rule 25 and treatment     Support System  Significant relationship at present time:  Sister Namrata  Family of origin is available for support:  yes  Other support available:  parents  Gaps in support system:  None noted  Patient is caregiver to:  None     Provider Information   Primary Care Physician:  Clinic, [facility] [address]   [phone]   Clinic:  [id number] [place] [id number]       :  дмитрий    Financial   Income Source:  Works FT, nights, at [employer]  Financial Concerns:  None noted  Insurance:    Payor/Plan Subscriber Name Rel Member # Group #   [employer]  [id number] [facility] [address]       Discharge Plan   Patient and family discharge goal:  detox  Provided education on discharge plan:  YES  Patient agreeable to discharge plan:  YES  A list of Medicare Certified Facilities was provided to the patient and/or family to encourage patient choice. Patient's choices for facility are:  NA  Will NH provide Skilled rehabilitation or complex medical:  NO  General information regarding anticipated insurance coverage and possible out of pocket cost was discussed. Patient and patient's family are aware patient may incur the cost of transportation to the facility, pending insurance payment: YES  Barriers to discharge:  Medical clearance from hepatology    Discharge Recommendations   Anticipated Disposition:  [facility]  Transportation Needs:  Medical:  Wheelchair  Name of Transportation Company and Phone:  [employer]    Additional comments   sw awaiting confirmation on medical clearance from primary team prior to making detox referral.    UPDATE: 1:50 pm- team ok with medical dc. SW called [facility], they state no beds available and not going to put pt on a wait list, need to call back in the morning. SW will call in the AM.    Sandra RENAE, MSW  5B  (Medical/Surgical)  Phone: [phone]  Pager: [phone]

## 2018-03-15 NOTE — UTILIZATION REVIEW
"  Admission Status; Secondary Review Determination         Under the authority of the Utilization Management Committee, the utilization review process indicated a secondary review on the above patient.  The review outcome is based on review of the medical records, discussions with staff, and applying clinical experience noted on the date of the review.          (x) Observation Status Appropriate - This patient does not meet hospital inpatient criteria and is placed in observation status. If this patient's primary payer is Medicare and was admitted as an inpatient, Condition Code 44 should be used and patient status changed to \"observation\".     RATIONALE FOR DETERMINATION   38 year old male with a past medical history significant for alcohol abuse, kidney stones, and anxiety presented to the ED with desire to detox.  No evidence of severe withdrawal, hepatology consulted for concern for new liver cirrhosis.  The severity of illness, intensity of service provided, expected LOS and risk for adverse outcome make the care appropriate for further observation; however, doesn't meet criteria for hospital inpatient admission. Екатерина Garay and Cheikh Raymond notified of this determination.    This document was produced using voice recognition software.      The information on this document is developed by the utilization review team in order for the business office to ensure compliance.  This only denotes the appropriateness of proper admission status and does not reflect the quality of care rendered.         The definitions of Inpatient Status and Observation Status used in making the determination above are those provided in the CMS Coverage Manual, Chapter 1 and Chapter 6, section 70.4.      Sincerely,     PAUL PAYNE MD    System Medical Director  Utilization Management  [facility].      "

## 2018-03-15 NOTE — PROGRESS NOTES
[facility], [place]    Internal Medicine Progress Note - [facility] Service    Main Plans for Today   Hepatology consult  Arrange for transfer to detox     Assessment & Plan   Sylvain Pitts is a 38 year old male admitted on 3/14/2018. He has a history of alcohol abuse, kidney stones, and anxiety and is admitted for alcohol cessation, found to have new cirrhosis with portal hypertension.    #Decompensated cirrhosis   #Elevated transaminases  Hx of alcohol abuse, new finding of cirrhosis on us of abdomen with findings of portal hypertension and stigmata of cirrhosis. Elevated transaminases as well, with ratio of ast/alt consistent with alcohol use. Possible other etiologies for include viral versus autoimmune disease. Not encephalopathic. MELD score 23.   -viral serologies  -hepatology consult     #Alcohol abuse  Heavy alcohol use. Prior treatment about 2 years ago, relapsed after divorce. Will have patient on the [facility] [place] for alcohol withdrawal. Says his last drink was on 3/13; however, UA on admission was positive for alcohol.  -[facility] protocol   - B12, folate  -alcohol cessation counseling  -transfer to detox     #Right lower extremity edema  Very unlikely to be DVT as patient has no risk factors.     #Macrocytic anemia  #Thrombocytopenia   Likely secondary to alcohol use as well as cirrhosis. Thrombocytopenia likely from alcohol as well as likely splenic sequestration.     Diet: Regular Diet Adult  Fluids: None  DVT Prophylaxis: Mechanical  Code Status: Full Code    Disposition Plan   Expected discharge: Today, recommended to detox once safe disposition plan/bed available.     Entered: Cheikh Raymond 03/15/2018, 7:38 AM   Information in the above section will display in the discharge planner report.      The patient's care was discussed with the Attending Physician, Dr. Garay.    Cheikh Raymond  [facility]: 2  Pager: [phone]  Please see sticky note for cross  cover information    Interval History   No acute events overnight.  Patient is anxious about his liver damage. He expresses a serious interest in quitting drinking. Denies chest pain, nausea, abdominal pain, shortness of breath, shakes.    Physical Exam   Vital Signs: Temp: 98.7  F (37.1  C) Temp src: Oral BP: 134/76 Pulse: 93   Resp: 18 SpO2: 96 % O2 Device: None (Room air)    Weight: 223 lbs 4.8 oz  General Appearance: Resting comfortably in bed, no acute distress  Respiratory: Clear to auscultation bilaterally  Cardiovascular: Regular rate and rhythm, normal S1/S2  GI: Abdomen distended, soft, not tender to palpation.  Skin: Jaundiced        Data   Data     Recent Labs  Lab 03/15/18  0720 03/14/18  2235 03/14/18  1837   WBC 6.6  --  8.1   HGB 11.2*  --  12.0*   *  --  108*   PLT 66*  --  79*   INR 1.74* 1.78*  --      --  140   POTASSIUM 3.7  --  4.0   CHLORIDE 106  --  106   CO2 19*  --  23   BUN 4*  --  4*   CR 0.53*  --  0.54*   ANIONGAP 14  --  11   JOHANN 8.0*  --  8.1*   *  --  117*   ALBUMIN 2.5*  --  2.9*   PROTTOTAL 7.3  --  8.2   BILITOTAL 14.4*  --  14.4*   ALKPHOS 157*  --  180*   ALT 59  --  59   *  --  211*   LIPASE  --   --  479*

## 2018-03-15 NOTE — PROGRESS NOTES
/67 (BP Location: Left arm)  Pulse 94  Temp 99.4  F (37.4  C) (Oral)  Resp 16  Wt 101.3 kg (223 lb 4.8 oz)  SpO2 99%    Observations Goals:   1. Taking good PO intake -Not met. Pt has not ordered food, says not hungry.   2. Not needing IV medications for withdrawal. -Met.      4140-2064: Pt A&Ox4, VSS on RA, up ad lana. Pt here for ETOH abuse and desire to detox with chem dep program. Slight tremors, some agitation and anxiety this morning. Highest score was 11 on MSSA protocol, PRN Ativan 1mg given once with decrease in symptoms. GI hepatology consulted. Pt cleared for discharge to chemical dependency program as soon as bed is available. Continue to monitor and with POC.
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
## 2018-03-15 NOTE — PLAN OF CARE
Problem: Patient Care Overview  Goal: Plan of Care/Patient Progress Review  Outcome: No Change  Patient 38 year old Canadian speaking male admitted for evaluation of an alcohol addiction with history for alcohol abuse, kidney stones and anxiety. Patient alert and oriented x 4. Vitals stable on room air. Temp 98.3, , /85, Resp 20, Oxygen sats 96% on room air.  Patient denies pain no nausea/vomiting or SOB. Skin warm ,dry and jaundice. Sclera are icteric. Lungs are clear to auscultation. Abdomen distended with positive BS's. Left PIV saline locked. MD notified and in room for assessment. Awaiting orders.

## 2018-03-15 NOTE — PHARMACY-ADMISSION MEDICATION HISTORY
Admission Medication History status for the 3/14/2018 admission is complete.  See EPIC admission navigator for Prior to Admission medications.    Medication history interview sources:  Patient     Medication history source reliability: Good    Patient reports taking no prescription or over-the-counter medications    Medication history completed by: Alexis Jarvis, Pharmacy Intern      Prior to Admission medications    Not on File

## 2018-03-15 NOTE — PROGRESS NOTES
Pt admitted to Outpatient Observation status. Observation handout provided to pt.     Observations Goals:   1. Taking good PO intake -Not met. Pt has not ordered food, says not hungry.   2. Not needing IV medications for withdrawal. -Met.

## 2018-03-15 NOTE — PLAN OF CARE
Problem: Patient Care Overview  Goal: Plan of Care/Patient Progress Review  No complains. Vitals stable. MSSA score 5. Slept between cares. Continue to monitor and follow POC.
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
## 2018-03-15 NOTE — CONSULTS
Hepatology Consultation    Sylvain Pitts   MRN# [id number]     Age: 38 year old YOB: 1980       Referring provider: Joe Garay  Attending Hepatologist: Dr. Lee  Consult requested for: hyperbilirubinemia       Assessment and Recommendation:   Assessment:   Mr. Pitts is a 38-year-old with a history of anxiety and alcohol dependence who was admitted for detox and noted to have elevated liver tests. Lab testing and US have evidence of cirrhosis.       Recommendations:   1. Alcohol hepatitis  2. Alcohol cirrhosis  - MELD 22. Not a candidate for liver transplant due to recent alcohol use  - Maddrey score 49. May benefit from steroids, however, may worsen anxiety that he had been using alcohol to help cope. Consider prednisolone 40 mg po daily for hepatitis if anxiety under control.   - no significant ascites but does have thrombocytopenia and splenomegaly that has increased over the past year that may indicate he has cirrhosis.   - please send Hep A (IgG), Hep B core antibody (IgG), Hbs antigen, Hbs Antibody.  - continue folic acid and thiamine replacement  - encourage balanced oral intake  - will likely need EGD as outpatient     3. Alcohol dependence  - at risk for withdrawal, continue to monitor. No evidence of HE or other hepatic decompensation.   - he will likely need CD treatment and     4. Immunizations  - please give pneumovax prior to discharge.     Plan of care discussed with Dr. Lee    Thank you for the opportunity to be involved in Sylvain Pitts care. Please call with any questions or concerns.     Sherry Arroyo, PAUL, CNP  [phone]               History of Present Illness:   Sylvain Pitts is a 38 year old male with a history of  could be anxiety came to the hospital for detox due to concerns of jaundice and heavy alcohol use.  He reports heavy alcohol use starting approximately 2 years ago but did have a period of abstinence of 7 months with resuming use  and variable use since summer 2017.  He reports increased stress at home and work as part of the reason for his increased use in addition to coping with anxiety.  He reports having anywhere from 2 to 10 shots of either whiskey or vodka daily and goes through a pint every day.  His last alcohol use was 3/13/18.  He denies ever being told that he had evidence of liver disease but does recall having a few episodes of jaundice in the past.  He has noticed an increase in abdominal distention with bloating he reports does decrease having a bowel movement.  He denies any tremors even been having more.  He does use clonazepam for anxiety but does not take when he is drinking alcohol.     He denies fevers, abdominal pain, change in mentation, lower extremity edema, melena, hematochezia, or vomiting.  He continues to have an appetite however with bloating  he is having early satiety.               Past Medical History:     Past Medical History:   Diagnosis Date     Anxiety      Kidney stones               Past Surgical History:     Past Surgical History:   Procedure Laterality Date     HEAD & NECK SURGERY      head surgery when he was 14 years old.              Social History:     Alcohol- 2-10 shots (1 pint)  of vodka or whiskey daily since summer, 2017. Did drink alcohol since teenager but was 1-2 drinks, 2-3 times per week. Increased frequency use started in 2016. No DUI or treatment program Last alcohol use 3/13/18    No illicit IV or intranasal drug use  Non smoker    Born in [place], works as a .          Family History:   No family history of liver disease or liver cancer.              Immunizations:   He reports that he is unclear about what vaccinations he had at [facility]         Allergies:   No Known Allergies          Medications:   @  No prescriptions prior to admission.   @          Review of Systems:    ROS: 10 point ROS neg other than the symptoms noted above in the HPI.          Physical Exam:   Blood  pressure 127/64, pulse 100, temperature 99.2  F (37.3  C), temperature source Oral, resp. rate 18, weight 101.3 kg (223 lb 4.8 oz), SpO2 98 %. There is no height or weight on file to calculate BMI.    Intake/Output Summary (Last 24 hours) at 03/15/18 1126  Last data filed at 03/15/18 0200   Gross per 24 hour   Intake              240 ml   Output                0 ml   Net              240 ml     General: In no acute distress, no facial muscle wasting  Neuro: AOx3, No asterixis  HEENT: PERRL mild scleral icterus, Nooral lesions  Lymph:  Nocervical lymphadenoapthy  CV: S1/S2 with gr 3/6 murmurs, Skin warm and dry  Lungs: clear to auscultation Respirations even and nonlabored on room air  Abd: Mildly distended, mild hepatomegaly. +BS. No tympanny  Extrem: Noperipehral edema  Skin: mild jaundice  Psych: pleasant         Data:     Lab Results   Component Value Date    WBC 6.6 03/15/2018     Lab Results   Component Value Date    RBC 3.13 03/15/2018     Lab Results   Component Value Date    HGB 11.2 03/15/2018     Lab Results   Component Value Date    HCT 35.0 03/15/2018     No components found for: MCT  Lab Results   Component Value Date     03/15/2018     Lab Results   Component Value Date    MCH 35.8 03/15/2018     Lab Results   Component Value Date    MCHC 32.0 03/15/2018     Lab Results   Component Value Date    RDW 16.9 03/15/2018     Lab Results   Component Value Date    PLT 66 03/15/2018       Last Basic Metabolic Panel:  Lab Results   Component Value Date     03/15/2018      Lab Results   Component Value Date    POTASSIUM 3.7 03/15/2018     Lab Results   Component Value Date    CHLORIDE 106 03/15/2018     Lab Results   Component Value Date    JOHANN 8.0 03/15/2018     Lab Results   Component Value Date    CO2 19 03/15/2018     Lab Results   Component Value Date    BUN 4 03/15/2018     Lab Results   Component Value Date    CR 0.53 03/15/2018     Lab Results   Component Value Date     03/15/2018        Liver Function Studies -   Recent Labs   Lab Test  03/15/18   0720   PROTTOTAL  7.3   ALBUMIN  2.5*   BILITOTAL  14.4*   ALKPHOS  157*   AST  199*   ALT  59       Lab Results   Component Value Date    INR 1.74 03/15/2018       MELD-Na score: 22 at 3/15/2018  7:20 AM  MELD score: 22 at 3/15/2018  7:20 AM  Calculated from:  Serum Creatinine: 0.53 mg/dL (Rounded to 1) at 3/15/2018  7:20 AM  Serum Sodium: 139 mmol/L (Rounded to 137) at 3/15/2018  7:20 AM  Total Bilirubin: 14.4 mg/dL at 3/15/2018  7:20 AM  INR(ratio): 1.74 at 3/15/2018  7:20 AM  Age: 38 years    IMAGING:  US abd 3/14/18  IMPRESSION:  1. Cirrhotic appearing liver.  2. Findings consistent with portal hypertension including retrograde  flow in the splenic, main portal and right portal veins.  3. Sludge in the gallbladder. No evidence for acute cholecystitis.  4. Thickening of the gallbladder wall likely related to the presence  of ascites.  5. Splenomegaly.

## 2018-03-16 ENCOUNTER — HOSPITAL ENCOUNTER (INPATIENT)
Facility: CLINIC | Age: 38
LOS: 2 days | Discharge: ANOTHER HOSPITAL | DRG: 896 | End: 2018-03-18
Attending: PSYCHIATRY & NEUROLOGY | Admitting: PSYCHIATRY & NEUROLOGY
Payer: COMMERCIAL

## 2018-03-16 VITALS
SYSTOLIC BLOOD PRESSURE: 130 MMHG | TEMPERATURE: 98.5 F | DIASTOLIC BLOOD PRESSURE: 82 MMHG | WEIGHT: 223.3 LBS | RESPIRATION RATE: 18 BRPM | OXYGEN SATURATION: 95 % | HEART RATE: 113 BPM

## 2018-03-16 DIAGNOSIS — F10.10 ALCOHOL ABUSE: Primary | ICD-10-CM

## 2018-03-16 PROBLEM — F10.939 ALCOHOL WITHDRAWAL (H): Status: ACTIVE | Noted: 2018-03-16

## 2018-03-16 PROCEDURE — 12800012 ZZH R&B CD MH INTERMEDIATE ADULT

## 2018-03-16 PROCEDURE — HZ2ZZZZ DETOXIFICATION SERVICES FOR SUBSTANCE ABUSE TREATMENT: ICD-10-PCS | Performed by: PSYCHIATRY & NEUROLOGY

## 2018-03-16 PROCEDURE — 25000128 H RX IP 250 OP 636

## 2018-03-16 PROCEDURE — 90472 IMMUNIZATION ADMIN EACH ADD: CPT

## 2018-03-16 PROCEDURE — G0378 HOSPITAL OBSERVATION PER HR: HCPCS

## 2018-03-16 PROCEDURE — 99217 ZZC OBSERVATION CARE DISCHARGE: CPT | Mod: GC | Performed by: HOSPITALIST

## 2018-03-16 PROCEDURE — 90732 PPSV23 VACC 2 YRS+ SUBQ/IM: CPT

## 2018-03-16 PROCEDURE — 25000132 ZZH RX MED GY IP 250 OP 250 PS 637: Performed by: STUDENT IN AN ORGANIZED HEALTH CARE EDUCATION/TRAINING PROGRAM

## 2018-03-16 PROCEDURE — 25000132 ZZH RX MED GY IP 250 OP 250 PS 637: Performed by: PSYCHIATRY & NEUROLOGY

## 2018-03-16 PROCEDURE — 25000132 ZZH RX MED GY IP 250 OP 250 PS 637

## 2018-03-16 PROCEDURE — 25000132 ZZH RX MED GY IP 250 OP 250 PS 637: Performed by: MARRIAGE & FAMILY THERAPIST

## 2018-03-16 RX ORDER — ALUMINA, MAGNESIA, AND SIMETHICONE 2400; 2400; 240 MG/30ML; MG/30ML; MG/30ML
30 SUSPENSION ORAL EVERY 4 HOURS PRN
Status: DISCONTINUED | OUTPATIENT
Start: 2018-03-16 | End: 2018-03-18 | Stop reason: HOSPADM

## 2018-03-16 RX ORDER — LANOLIN ALCOHOL/MO/W.PET/CERES
100 CREAM (GRAM) TOPICAL DAILY
Status: DISCONTINUED | OUTPATIENT
Start: 2018-03-17 | End: 2018-03-18 | Stop reason: HOSPADM

## 2018-03-16 RX ORDER — CLONIDINE HYDROCHLORIDE 0.1 MG/1
0.1 TABLET ORAL ONCE
Status: COMPLETED | OUTPATIENT
Start: 2018-03-16 | End: 2018-03-16

## 2018-03-16 RX ORDER — MULTIPLE VITAMINS W/ MINERALS TAB 9MG-400MCG
1 TAB ORAL DAILY
Status: DISCONTINUED | OUTPATIENT
Start: 2018-03-17 | End: 2018-03-18 | Stop reason: HOSPADM

## 2018-03-16 RX ORDER — CLONIDINE HYDROCHLORIDE 0.1 MG/1
0.1 TABLET ORAL 2 TIMES DAILY
Status: DISCONTINUED | OUTPATIENT
Start: 2018-03-16 | End: 2018-03-16 | Stop reason: HOSPADM

## 2018-03-16 RX ORDER — LORAZEPAM 1 MG/1
1-4 TABLET ORAL EVERY 30 MIN PRN
Status: DISCONTINUED | OUTPATIENT
Start: 2018-03-16 | End: 2018-03-18 | Stop reason: HOSPADM

## 2018-03-16 RX ORDER — CLONIDINE HYDROCHLORIDE 0.1 MG/1
0.1 TABLET ORAL 2 TIMES DAILY
Status: DISCONTINUED | OUTPATIENT
Start: 2018-03-16 | End: 2018-03-18 | Stop reason: HOSPADM

## 2018-03-16 RX ORDER — FOLIC ACID 1 MG/1
1 TABLET ORAL DAILY
Status: DISCONTINUED | OUTPATIENT
Start: 2018-03-17 | End: 2018-03-18 | Stop reason: HOSPADM

## 2018-03-16 RX ORDER — GABAPENTIN 800 MG/1
800 TABLET ORAL 3 TIMES DAILY
Qty: 15 TABLET | Refills: 0 | Status: ON HOLD | OUTPATIENT
Start: 2018-03-16 | End: 2018-03-24

## 2018-03-16 RX ORDER — GABAPENTIN 800 MG/1
800 TABLET ORAL 3 TIMES DAILY
Status: DISCONTINUED | OUTPATIENT
Start: 2018-03-16 | End: 2018-03-18 | Stop reason: HOSPADM

## 2018-03-16 RX ORDER — CLONIDINE HYDROCHLORIDE 0.1 MG/1
0.1 TABLET ORAL 2 TIMES DAILY
Qty: 10 TABLET | Refills: 0 | Status: ON HOLD | OUTPATIENT
Start: 2018-03-16 | End: 2018-03-24

## 2018-03-16 RX ADMIN — GABAPENTIN 800 MG: 800 TABLET, FILM COATED ORAL at 19:51

## 2018-03-16 RX ADMIN — LORAZEPAM 1 MG: 1 TABLET ORAL at 11:40

## 2018-03-16 RX ADMIN — LORAZEPAM 1 MG: 1 TABLET ORAL at 20:00

## 2018-03-16 RX ADMIN — PNEUMOCOCCAL VACCINE POLYVALENT 0.5 ML
25; 25; 25; 25; 25; 25; 25; 25; 25; 25; 25; 25; 25; 25; 25; 25; 25; 25; 25; 25; 25; 25; 25 INJECTION, SOLUTION INTRAMUSCULAR; SUBCUTANEOUS at 14:41

## 2018-03-16 RX ADMIN — LORAZEPAM 2 MG: 1 TABLET ORAL at 22:41

## 2018-03-16 RX ADMIN — LORAZEPAM 2 MG: 1 TABLET ORAL at 04:40

## 2018-03-16 RX ADMIN — LORAZEPAM 1 MG: 1 TABLET ORAL at 17:40

## 2018-03-16 RX ADMIN — Medication 100 MG: at 08:37

## 2018-03-16 RX ADMIN — LORAZEPAM 1 MG: 1 TABLET ORAL at 08:37

## 2018-03-16 RX ADMIN — GABAPENTIN 800 MG: 800 TABLET, FILM COATED ORAL at 14:39

## 2018-03-16 RX ADMIN — LORAZEPAM 2 MG: 1 TABLET ORAL at 01:19

## 2018-03-16 RX ADMIN — CLONIDINE HYDROCHLORIDE 0.1 MG: 0.1 TABLET ORAL at 09:59

## 2018-03-16 RX ADMIN — CLONIDINE HYDROCHLORIDE 0.1 MG: 0.1 TABLET ORAL at 02:43

## 2018-03-16 RX ADMIN — FOLIC ACID 1 MG: 1 TABLET ORAL at 08:37

## 2018-03-16 RX ADMIN — LORAZEPAM 1 MG: 1 TABLET ORAL at 14:39

## 2018-03-16 RX ADMIN — GABAPENTIN 800 MG: 800 TABLET, FILM COATED ORAL at 08:37

## 2018-03-16 RX ADMIN — CLONIDINE HYDROCHLORIDE 0.1 MG: 0.1 TABLET ORAL at 19:51

## 2018-03-16 ASSESSMENT — ACTIVITIES OF DAILY LIVING (ADL)
GROOMING: WITH ASSISTANCE
LAUNDRY: UNABLE TO COMPLETE
DRESS: SCRUBS (BEHAVIORAL HEALTH)
ORAL_HYGIENE: PROMPTS;WITH SUPERVISION

## 2018-03-16 NOTE — PROGRESS NOTES
Observations Goals:   1. Taking good PO intake -Met.   2. Not needing IV medications for withdrawal. -Met.

## 2018-03-16 NOTE — PROGRESS NOTES
Social Work Services Progress Note    Hospital Day: 3  Date of Initial Social Work Evaluation:  3/15/2018  Collaborated with:  Patient, [facility] intake, medicine team    Data:  Juan Antonio is a 38 year old male admitted to [facility] for detox from alcohol. PMH of alcohol abuse, kidney stones and anxiety. TRISTA met with patient yesterday who is now in current withdrawal. He wishes to go to [facility] and hopefully continue to pursue alcohol treatment.    Intervention:  SW called [facility] detox unit and referred patient for a bed, yesterday afternoon at 4 PM. Person answering phone stated there are no beds and they will not put patient on a list and to call back the next morning. SW called this morning at 8:25 am and referred patient to detox. They stated they have not beds, SW requested they put this med/surg patient on the list for next available bed. They agreed to do so. Spoke with Shanique in intake. TRISTA provided call back number for updates.     SW placed follow up call to intake at 2:30 PM, there are some potential discharges happening on detox but unsure of the exact timelines of these. Susie confirms he is on the list for detox and they will call the RN station when the bed is available to coordinate transfer.    TRISTA updated ciara Mendoza intern with timeline, TRISTA also updated bedside RN with the plan/timeline.     Updated 3:45 pm from [facility], likely going to take patient this evening, would like Ciara to page MD at [facility] @ [phone]. TRISTA updated resident Ellen who is in agreement to do this.    Assessment:  Juan Antonio is agreeable to this plan and continues to express desire to transfer to detox.     Plan:    Anticipated Disposition:  Facility:  [facility]    Barriers to d/c plan:  Detox bed at [facility]. [phone]    Follow Up:  sw continuing to follow, RN and MD updated    Sandra RENAE, MSW  5B  (Medical/Surgical)  Phone: [phone]  Pager: [phone]

## 2018-03-16 NOTE — PLAN OF CARE
Problem: Patient Care Overview  Goal: Plan of Care/Patient Progress Review  Patient alert and oriented x4. Temp 99.9, , /82, Resp 16, Oxygen sats 96% on room air. MSSA score 11. Ativan given per protocol. MD  Notified. Awaiting new orders. Will continue to monitor and follow POC.

## 2018-03-16 NOTE — PLAN OF CARE
Problem: Patient Care Overview  Goal: Plan of Care/Patient Progress Review  Outcome: Improving    /84 (BP Location: Right arm)  Pulse 119  Temp 99.4  F (37.4  C) (Oral)  Resp 16  Wt 101.3 kg (223 lb 4.8 oz)  SpO2 95%    Observations Goals:   1. Taking good PO intake -Met.   2. Not needing IV medications for withdrawal. -Met.     4870-8460: Pt A&Ox4, VSS on RA, up ad lana. Pt here for ETOH abuse and desire to detox with chem dep program. Slight tremors, tachycardia in 110s, some agitation and anxiety this morning. Highest score was 11 on MSSA protocol, PRN Ativan with decrease in symptoms. Pt cleared for discharge to chemical dependency program as soon as bed is available. PIV removed. Continue to monitor and with POC.

## 2018-03-16 NOTE — IP AVS SNAPSHOT
[facility]    [address] [place] [address]    Phone:  [phone]                                       After Visit Summary   3/16/2018    Sylvain Pitts    MRN: [id number]           After Visit Summary Signature Page     I have received my discharge instructions, and my questions have been answered. I have discussed any challenges I see with this plan with the nurse or doctor.    ..........................................................................................................................................  Patient/Patient Representative Signature      ..........................................................................................................................................  Patient Representative Print Name and Relationship to Patient    ..................................................               ................................................  Date                                            Time    ..........................................................................................................................................  Reviewed by Signature/Title    ...................................................              ..............................................  Date                                                            Time

## 2018-03-16 NOTE — PLAN OF CARE
Problem: Patient Care Overview  Goal: Plan of Care/Patient Progress Review  Observations Goals:   1. Taking good PO intake : Improving  2. Not needing IV medications for withdrawal. -Met.

## 2018-03-16 NOTE — IP AVS SNAPSHOT
MRN:[id number]                      After Visit Summary   3/16/2018    Sylvain Pitts    MRN: [id number]           Thank you!     Thank you for choosing [facility] for your care. Our goal is always to provide you with excellent care.        Patient Information     Date Of Birth          1980        Designated Caregiver       Most Recent Value    Caregiver    Will someone help with your care after discharge? yes    Name of designated caregiver Namrata    Phone number of caregiver [phone]    Caregiver address lives with patient      About your hospital stay     You were admitted on:  March 16, 2018 You last received care in the:  [facility]    You were discharged on:  March 18, 2018       Who to Call     For medical emergencies, please call 911.  For non-urgent questions about your medical care, please call your primary care provider or clinic, [phone]          Attending Provider     Provider Specialty    Du York MD Psychiatry       Primary Care Provider Office Phone # Fax #    [facility] [phone]      Your next 10 appointments already scheduled     Apr 02, 2018  9:00 AM CDT   LAB with  LAB   [facility] Lab [facility])    [address]  1st Floor  [facility] [place] [address] [phone]           Please do not eat 10-12 hours before your appointment if you are coming in fasting for labs on lipids, cholesterol, or glucose (sugar). This does not apply to pregnant women. Water, hot tea and black coffee (with nothing added) are okay. Do not drink other fluids, diet soda or chew gum.            Apr 02, 2018 10:00 AM CDT   (Arrive by 9:45 AM)   New General Liver with Marcela Monge MD   [facility] Hepatology ([facility])    [address]  [address]  [place] [place] [address] [phone]              Further instructions from your care  team           Behavioral Discharge Planning and Instructions    Summary: You were admitted to Station 3A on 3- for detoxification from alcohol.     A medical exam was performed that included lab work. Case management assessment with recommendation for continuing care. You are being discharged to  on the [facility]    Main Diagnosis: Alcohol Dependence and withdrawal    Major Treatments, Procedures and Findings: Detoxification and stabilization. Chemical dependency assessment and referral    Symptoms to Report:  If  you experience feeling more anxiety, confusion, losing more sleep, mood declining or thoughts of suicide, notify your treatment team or notify your primary physician. IF ANY OF THE SYMPTOMS YOU ARE EXPERIENCING ARE A MEDICAL EMERGENCY CALL 911 IMMEDIATELY.     Lifestyle Adjustment: Adjust your lifestyle to get enough sleep, relaxation, exercise and  good nutrition. Continue to develop healthy coping skills to  decrease stress and promote a sober living environment. No use of alcohol, illegal drugs or addictive medications other than what is currently prescribed. AA, NA, and  Sponsor are excellent resources for support.     Psychiatry Follow-up:   Appointment Date/Time: ***   Psychiatrist/Primary Care Giver: ***   Address: ***   Phone Number: ***    Appointment Date/Time: ***   Therapist: ***   Address: ***   Phone Number: ***    Appointment Date/Time: ***   Support Group: ***   Address: ***   Phone Number: ***    Appointment Date/Time: ***   Other: ***   Address: ***   Phone Number: ***    If no appointments scheduled, explain ***    Resources:   Crisis Intervention: 992.615.1737 or 346-109-3115 (TTY: 457.236.4414).  Call anytime for help.  National [place] on Mental Illness (www.mn.noé.org): 512.634.9660 or 670-182-0547.  Alcoholics Anonymous (www.alcoholics-anonymous.org): Check your phone book for your local chapter.  Suicide Awareness Voices of Education (SAVE) (www.save.org):  "888-511-SAVE (7283)  National Suicide Prevention Line (www.mentalhealthmn.org): 279-920-WQZX (2674)  Mental Health Consumer/Survivor Network of [place] (www.mhcsn.net): 274.782.4908 or 217-858-5370  Mental Health Association of [place] (www.mentalhealth.org): 209.671.3353 or 366-378-6167     General Medication Instructions:   See your medication sheet(s) for instructions.   Take all medicines as directed.  Make no changes unless your doctor suggests them.   Go to all your doctor visits.  Be sure to have all your required lab tests. This way, your medicines can be refilled on time.  Do not use any drugs not prescribed by your doctor.  Avoid alcohol.    Please Note:  If you have any questions at anytime after you are discharged please call the [facility] [place] detox unit [facility] at [phone].  Please take this discharge folder with you to all your follow up appointments, it contains your lab results, diagnosis, medication list and discharge recommendations.       Pending Results     Date and Time Order Name Status Description    3/18/2018 1244 Procalcitonin In process     3/18/2018 0149 Blood culture Preliminary     3/18/2018 0148 Blood culture Preliminary             Statement of Approval     Ordered          03/18/18 1228  I have reviewed and agree with all the recommendations and orders detailed in this document.  EFFECTIVE NOW     Approved and electronically signed by:  Du York MD             Admission Information     Date & Time Provider Department Dept. Phone    3/16/2018 Du York MD [facility] [phone]      Your Vitals Were     Blood Pressure Pulse Temperature Respirations          126/73 99 100.7  F (38.2  C) (Oral) 16        MyChart Information     [facility] lets you send messages to your doctor, view your test results, renew your prescriptions, schedule appointments and more. To sign up, go to [email or web address] . Click on \"Log in\" " "on the left side of the screen, which will take you to the Welcome page. Then click on \"Sign up Now\" on the right side of the page.     You will be asked to enter the access code listed below, as well as some personal information. Please follow the directions to create your username and password.     Your access code is: [email or web address]  Expires: 2018 11:16 AM     Your access code will  in 90 days. If you need help or a new code, please call your [facility] clinic or [phone].        Care EveryWhere ID     This is your Care EveryWhere ID. This could be used by other organizations to access your [facility] medical records  [id number]        Equal Access to Services     NANI OSUNA : Drew Odonnell, mer monahan, markel longo, loretta gibson. So [facility] [phone].    ATENCIÓN: Si habla español, tiene a henriquez disposición servicios gratuitos de asistencia lingüística. Llame al [phone].    We comply with applicable federal civil rights laws and [place] laws. We do not discriminate on the basis of race, color, national origin, age, disability, sex, sexual orientation, or gender identity.               Review of your medicines      START taking        Dose / Directions    multivitamin, therapeutic with minerals Tabs tablet   Used for:  Alcohol abuse        Dose:  1 tablet   Start taking on:  3/19/2018   Take 1 tablet by mouth daily   Quantity:  30 each   Refills:  0         CONTINUE these medicines which have NOT CHANGED        Dose / Directions    cloNIDine 0.1 MG tablet   Commonly known as:  CATAPRES   Used for:  Alcohol dependence with withdrawal with complication (H)        Dose:  0.1 mg   Take 1 tablet (0.1 mg) by mouth 2 times daily for 5 days   Quantity:  10 tablet   Refills:  0       gabapentin 800 MG tablet   Commonly known as:  NEURONTIN   Used for:  Alcohol dependence with withdrawal with complication (H)        Dose:  800 mg   Take 1 tablet " (800 mg) by mouth 3 times daily for 5 days   Quantity:  15 tablet   Refills:  0            Where to get your medicines      These medications were sent to [facility] [place], [place] - [address]  [address] [facility] [address], [facility] [address]     Phone:  [phone]     multivitamin, therapeutic with minerals Tabs tablet                Protect others around you: Learn how to safely use, store and throw away your medicines at www.disposemymeds.org.             Medication List: This is a list of all your medications and when to take them. Check marks below indicate your daily home schedule. Keep this list as a reference.      Medications           Morning Afternoon Evening Bedtime As Needed    cloNIDine 0.1 MG tablet   Commonly known as:  CATAPRES   Take 1 tablet (0.1 mg) by mouth 2 times daily for 5 days   Last time this was given:  0.1 mg on 3/18/2018  2:52 AM                                gabapentin 800 MG tablet   Commonly known as:  NEURONTIN   Take 1 tablet (800 mg) by mouth 3 times daily for 5 days   Last time this was given:  800 mg on 3/18/2018  9:32 AM                                multivitamin, therapeutic with minerals Tabs tablet   Take 1 tablet by mouth daily   Start taking on:  3/19/2018   Last time this was given:  1 tablet on 3/18/2018  9:30 AM

## 2018-03-16 NOTE — PROGRESS NOTES
[facility], [place]    Internal Medicine Progress Note - [facility] Service    Main Plans for Today   Hepatology consult  Arrange for transfer to detox     Assessment & Plan   Sylvain Pitts is a 38 year old male admitted on 3/14/2018. He has a history of alcohol abuse, kidney stones, and anxiety and is admitted for alcohol cessation, found to have new cirrhosis with portal hypertension.    #Decompensated cirrhosis   #Elevated transaminases  Hx of alcohol abuse, new finding of cirrhosis on us of abdomen with findings of portal hypertension and stigmata of cirrhosis. Elevated transaminases as well, with ratio of ast/alt consistent with alcohol use. Not encephalopathic. MELD score 23.  - Hepatology consult, recommend outpatient follow-up  - Hep A vaccine as outpatient     #Alcohol abuse  Heavy alcohol use. Prior treatment about 2 years ago, relapsed after divorce. Will have patient on the [facility] to screen for alcohol withdrawal. Says his last drink was on 3/13l.  -[facility] protocol   - B12, folate  -alcohol cessation counseling  -transfer to detox     #Right lower extremity edema, resolved  Very unlikely to be DVT as patient has no risk factors.     #Macrocytic anemia  #Thrombocytopenia   Likely secondary to alcohol use as well as cirrhosis. Thrombocytopenia likely from alcohol as well as likely splenic sequestration.     Diet: Regular Diet Adult  Advance Diet as Tolerated  Fluids: None  DVT Prophylaxis: Mechanical  Code Status: Full Code    Disposition Plan   Expected discharge: Today, recommended to detox once safe disposition plan/bed available.     Entered: Cheikh Raymond 03/16/2018, 2:26 PM   Information in the above section will display in the discharge planner report.      The patient's care was discussed with the Attending Physician, Dr. Garay.    Cheikh Raymond  [facility]: 2  Pager: [phone]  Please see sticky note for cross cover information    Interval History    No acute events overnight.  Patient has no complaints this morning. He is wondering if he will be here tonight or will go to detox tomorrow. Denies chest pain, nausea, abdominal pain, shortness of breath, shakes.    Physical Exam   Vital Signs: Temp: 99.4  F (37.4  C) Temp src: Oral BP: 131/84 Pulse: 119   Resp: 16 SpO2: 95 % O2 Device: None (Room air)    Weight: 223 lbs 4.8 oz  General Appearance: Resting comfortably in bed, no acute distress  Respiratory: Clear to auscultation bilaterally  Cardiovascular: Tachycardic. Regular rhythm, normal S1/S2  GI: Abdomen distended, soft, not tender to palpation.  Skin: Jaundiced        Data   Data     Recent Labs  Lab 03/15/18  0720 03/14/18  2235 03/14/18  1837   WBC 6.6  --  8.1   HGB 11.2*  --  12.0*   *  --  108*   PLT 66*  --  79*   INR 1.74* 1.78*  --      --  140   POTASSIUM 3.7  --  4.0   CHLORIDE 106  --  106   CO2 19*  --  23   BUN 4*  --  4*   CR 0.53*  --  0.54*   ANIONGAP 14  --  11   JOHANN 8.0*  --  8.1*   *  --  117*   ALBUMIN 2.5*  --  2.9*   PROTTOTAL 7.3  --  8.2   BILITOTAL 14.4*  --  14.4*   ALKPHOS 157*  --  180*   ALT 59  --  59   *  --  211*   LIPASE  --   --  479*

## 2018-03-16 NOTE — PLAN OF CARE
Problem: Patient Care Overview  Goal: Plan of Care/Patient Progress Review  Observations Goals:   1. Taking good PO intake -Not met. Pt has not ordered food, says not hungry.   2. Not needing IV medications for withdrawal. -Met.     Patient alert and oriented x 4. Denies pain no nausea, vomiting or SOB. No changes neuro vitals. Temp 99.4, Hr 107 /79, Resp 18, Oxygen sats 95% on room air. MSSA score 8.  Po ativan 1 mg given per protocol. Continue to monitor and follow POC.

## 2018-03-17 PROCEDURE — 99207 ZZC CDG-HISTORY COMP: MEETS EXP. PROBLEM FOCUSED-DOWN CODED LACK OF ROS: CPT | Performed by: PSYCHIATRY & NEUROLOGY

## 2018-03-17 PROCEDURE — 25000132 ZZH RX MED GY IP 250 OP 250 PS 637: Performed by: PSYCHIATRY & NEUROLOGY

## 2018-03-17 PROCEDURE — 99207 ZZC DOWN CODE DUE TO SUBSEQUENT EXAM: CPT | Performed by: PSYCHIATRY & NEUROLOGY

## 2018-03-17 PROCEDURE — 99221 1ST HOSP IP/OBS SF/LOW 40: CPT | Mod: AI | Performed by: PSYCHIATRY & NEUROLOGY

## 2018-03-17 PROCEDURE — 12800012 ZZH R&B CD MH INTERMEDIATE ADULT

## 2018-03-17 RX ADMIN — LORAZEPAM 2 MG: 1 TABLET ORAL at 00:16

## 2018-03-17 RX ADMIN — GABAPENTIN 800 MG: 800 TABLET, FILM COATED ORAL at 00:18

## 2018-03-17 RX ADMIN — CLONIDINE HYDROCHLORIDE 0.1 MG: 0.1 TABLET ORAL at 09:07

## 2018-03-17 RX ADMIN — GABAPENTIN 800 MG: 800 TABLET, FILM COATED ORAL at 09:07

## 2018-03-17 RX ADMIN — LORAZEPAM 1 MG: 1 TABLET ORAL at 20:02

## 2018-03-17 RX ADMIN — LORAZEPAM 2 MG: 1 TABLET ORAL at 04:29

## 2018-03-17 RX ADMIN — LORAZEPAM 1 MG: 1 TABLET ORAL at 16:27

## 2018-03-17 RX ADMIN — GABAPENTIN 800 MG: 800 TABLET, FILM COATED ORAL at 21:21

## 2018-03-17 RX ADMIN — GABAPENTIN 800 MG: 800 TABLET, FILM COATED ORAL at 16:27

## 2018-03-17 RX ADMIN — MULTIPLE VITAMINS W/ MINERALS TAB 1 TABLET: TAB at 09:07

## 2018-03-17 RX ADMIN — Medication 100 MG: at 09:07

## 2018-03-17 RX ADMIN — CLONIDINE HYDROCHLORIDE 0.1 MG: 0.1 TABLET ORAL at 20:02

## 2018-03-17 RX ADMIN — LORAZEPAM 2 MG: 1 TABLET ORAL at 09:07

## 2018-03-17 RX ADMIN — CLONIDINE HYDROCHLORIDE 0.1 MG: 0.1 TABLET ORAL at 00:18

## 2018-03-17 RX ADMIN — FOLIC ACID 1 MG: 1 TABLET ORAL at 09:07

## 2018-03-17 ASSESSMENT — ACTIVITIES OF DAILY LIVING (ADL)
ORAL_HYGIENE: WITH ASSISTANCE
DRESS: SCRUBS (BEHAVIORAL HEALTH)
GROOMING: WITH ASSISTANCE
LAUNDRY: UNABLE TO COMPLETE

## 2018-03-17 NOTE — PLAN OF CARE
[facility] to [facility] with personal belongings and after visit summary.

## 2018-03-17 NOTE — PLAN OF CARE
Writing RN reviewed the after visit summary with the patient and answered patient's questions. Signature from patient and writing RN obtained.

## 2018-03-17 NOTE — CONSULTS
Brief Medicine Note:    Internal medicine consulted for admission H&P. Per notes, patient transferred from [facility] 3/16/2018, see discharge summary for details. Patient is scheduled for follow up with OP GI and should keep this appointment. HR 100s-110s in the setting of ongoing detox.   - Continue current plan of care  - Contact medicine if HR elevated despite completion of detox or HR >120
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
## 2018-03-17 NOTE — PROGRESS NOTES
Clinical Nutrition Services - brief note     Received Provider Order - Liver Failure     Discussed consult with RN. Per RN, pt not appropriate for RD visit at this time as pt is not feeling well.    Discussed with RN, RD to visit with pt as appropriate/feeling better as able.     Bambi Sauer RD, LD  Weekend/On-call Pager: [phone]

## 2018-03-17 NOTE — PLAN OF CARE
Problem: Substance Withdrawal  Goal: Substance Withdrawal  Signs and symptoms of listed problems will be absent or manageable.   Outcome: No Change  Continues in detox status requiring medication for symptoms of alcohol withdrawal.Requires stand by assist to ambulate. Denies suicide ideation and thoughts of self harm. Up in w/c for lunch. Ate well. Taking fluids well. Skin jaundiced.

## 2018-03-17 NOTE — PROGRESS NOTES
03/16/18 0095   Patient Belongings   Did you bring any home meds/supplements to the hospital?  No   Patient Belongings none   Disposition of Belongings Other (see comment)   Belongings Search Yes   Clothing Search Yes   Second Staff abhishek and jose l   General Info Comment see my notes   BIN  hoodie w/string, pants w/string and shoes  Security envelope #[id number]  [employer] card, [place] and [employer] card   LOCKED DRAWER  2 cell phones, no  and a wallet  A             Admission:  I am responsible for any personal items that are not sent to the safe or pharmacy.  [facility] is not responsible for loss, theft or damage of any property in my possession.  Signature:  _________________________________ Date: _______  Time: _____                                              Staff Signature:  ____________________________ Date: ________  Time: _____      2nd Staff person, if patient is unable/unwilling to sign:    Signature: ________________________________ Date: ________  Time: _____   Discharge  [facility] has returned all of my personal belongings:  Signature: _________________________________ Date: ________  Time: _____                                          Staff Signature:  ____________________________ Date: ________  Time: _____

## 2018-03-17 NOTE — H&P
Admitted:     03/16/2018      IDENTIFYING INFORMATION:  The patient is a 38-year-old  male.  He is .  He works for [employer]. He has 3 kids.      CHIEF COMPLAINT:  Alcohol.      HISTORY OF PRESENT ILLNESS:  The patient was transferred here from the medical floor for detox.  The patient initially went to [facility] to discuss his alcohol cessation. They recommended that he come to [facility] and because his bilirubin was elevated at 14.4, he was seen by Dr. Trinidad and he was medically stabilized.  He does have history of alcoholic cirrhosis with ascites and he was transferred here to finish the detoxification.  He did have an ultrasound shows a cirrhotic-appearing liver.      The patient began to drink alcohol at age of 21.  He says the last year was really rough. He had  and that is when his drinking became much more.  Records indicate that he was sober for 7 years and he had previous problematic drinking in 2014.  Nevertheless, he has been drinking for the past year.  He has  Withdrawal.spend more time drinking . He has tried to quit unsuccessfully.  He used despite negative consequences, family, money.  He has no history of DTs.  No history of seizures.  He has progressive loss of control, tried to quit unsuccessfully.  He does not use any drugs, does not smoke, denies any depression, anxiety, psychosis, rené, does not have any suicidal or homicidal ideation, plan or intent.      PAST PSYCHIATRIC HISTORY:  Never psychiatrically hospitalized.  Never in any chemical dependency treatments.      MEDICAL HISTORY:  He does have alcoholic cirrhosis with ascites, elevated liver enzymes.  His AST is 199.  Platelet count is 66.      PAST MEDICAL HISTORY:  The patient's vitals are as below.   VITAL SIGNS:  Temperature of 99, pulse of 114, respiratory rate of 16, blood pressure 134/73.      FAMILY HISTORY:  Paternal grandfather has alcoholism.      SOCIAL HISTORY:  He was born in [facility].  He moved  here in . He moved to [place] in .  He was  and .  He has 3 children.  He has 12+ years of education.      MENTAL STATUS EXAMINATION:  The patient is a 38-year-old male who appears quite jaundiced.  He is in a wheelchair.  He says  he is using a wheelchair to ambulate.  His mood is anxious, affect is congruent. He speaks through an Ugandan .  Linear thought process, no loosening of association.  Insight and judgment are limited.  Alert and oriented x3.  Recent and remote memory, language, fund of knowledge are all adequate.      DIAGNOSES:   Axis I.  Alcohol use disorder, severe.  Alcoholic cirrhosis.      PLAN:  The patient will be put on a detox using [facility] protocol on Ativan given the history of cirrhosis.  The patient will be on gabapentin 800 mg 3 times a day.  The patient will be on clonidine 0.1 mg twice a day.  The patient will be seen by case management and Internal Medicine.  The patient at this time does not have any suicidal or homicidal ideation, plan or intent.         PIERRE MAGALLON MD             D: 2018   T: 2018   MT: HUBERT      Name:     URBAN SANTILLAN   MRN:      [id number]        Account:      [id number]   :      1980        Admitted:     2018                   Document: [id number]

## 2018-03-18 ENCOUNTER — APPOINTMENT (OUTPATIENT)
Dept: ULTRASOUND IMAGING | Facility: CLINIC | Age: 38
DRG: 896 | End: 2018-03-18
Attending: PHYSICIAN ASSISTANT
Payer: COMMERCIAL

## 2018-03-18 ENCOUNTER — APPOINTMENT (OUTPATIENT)
Dept: GENERAL RADIOLOGY | Facility: CLINIC | Age: 38
DRG: 896 | End: 2018-03-18
Attending: PHYSICIAN ASSISTANT
Payer: COMMERCIAL

## 2018-03-18 ENCOUNTER — HOSPITAL ENCOUNTER (INPATIENT)
Facility: CLINIC | Age: 38
LOS: 6 days | Discharge: HOME | DRG: 178 | End: 2018-03-24
Attending: INTERNAL MEDICINE | Admitting: HOSPITALIST
Payer: COMMERCIAL

## 2018-03-18 VITALS
DIASTOLIC BLOOD PRESSURE: 73 MMHG | HEART RATE: 99 BPM | RESPIRATION RATE: 16 BRPM | SYSTOLIC BLOOD PRESSURE: 126 MMHG | TEMPERATURE: 100.7 F

## 2018-03-18 DIAGNOSIS — K70.31 ALCOHOLIC CIRRHOSIS OF LIVER WITH ASCITES (H): Primary | ICD-10-CM

## 2018-03-18 DIAGNOSIS — G47.9 SLEEP DIFFICULTIES: ICD-10-CM

## 2018-03-18 DIAGNOSIS — J18.9 HCAP (HEALTHCARE-ASSOCIATED PNEUMONIA): ICD-10-CM

## 2018-03-18 PROBLEM — R65.10 SIRS (SYSTEMIC INFLAMMATORY RESPONSE SYNDROME) (H): Status: ACTIVE | Noted: 2018-03-18

## 2018-03-18 LAB
ALBUMIN SERPL-MCNC: 2.5 G/DL (ref 3.4–5)
ALP SERPL-CCNC: 152 U/L (ref 40–150)
ALT SERPL W P-5'-P-CCNC: 45 U/L (ref 0–70)
ANION GAP SERPL CALCULATED.3IONS-SCNC: 10 MMOL/L (ref 3–14)
ANION GAP SERPL CALCULATED.3IONS-SCNC: 8 MMOL/L (ref 3–14)
APPEARANCE FLD: CLEAR
AST SERPL W P-5'-P-CCNC: 106 U/L (ref 0–45)
BILIRUB DIRECT SERPL-MCNC: 12.2 MG/DL (ref 0–0.2)
BILIRUB SERPL-MCNC: 16.8 MG/DL (ref 0.2–1.3)
BUN SERPL-MCNC: 10 MG/DL (ref 7–30)
BUN SERPL-MCNC: 12 MG/DL (ref 7–30)
CALCIUM SERPL-MCNC: 8 MG/DL (ref 8.5–10.1)
CALCIUM SERPL-MCNC: 8.4 MG/DL (ref 8.5–10.1)
CHLORIDE SERPL-SCNC: 102 MMOL/L (ref 94–109)
CHLORIDE SERPL-SCNC: 104 MMOL/L (ref 94–109)
CO2 SERPL-SCNC: 22 MMOL/L (ref 20–32)
CO2 SERPL-SCNC: 23 MMOL/L (ref 20–32)
COLOR FLD: YELLOW
CREAT SERPL-MCNC: 0.62 MG/DL (ref 0.66–1.25)
CREAT SERPL-MCNC: 0.66 MG/DL (ref 0.66–1.25)
ERYTHROCYTE [DISTWIDTH] IN BLOOD BY AUTOMATED COUNT: 16.7 % (ref 10–15)
ERYTHROCYTE [DISTWIDTH] IN BLOOD BY AUTOMATED COUNT: 17.1 % (ref 10–15)
GFR SERPL CREATININE-BSD FRML MDRD: >90 ML/MIN/1.7M2
GFR SERPL CREATININE-BSD FRML MDRD: >90 ML/MIN/1.7M2
GLUCOSE SERPL-MCNC: 107 MG/DL (ref 70–99)
GLUCOSE SERPL-MCNC: 159 MG/DL (ref 70–99)
HCT VFR BLD AUTO: 33.6 % (ref 40–53)
HCT VFR BLD AUTO: 33.8 % (ref 40–53)
HGB BLD-MCNC: 11 G/DL (ref 13.3–17.7)
HGB BLD-MCNC: 11.3 G/DL (ref 13.3–17.7)
INR PPP: 1.77 (ref 0.86–1.14)
INR PPP: 1.84 (ref 0.86–1.14)
LACTATE BLD-SCNC: 1.8 MMOL/L (ref 0.7–2)
LACTATE BLD-SCNC: 1.9 MMOL/L (ref 0.7–2)
LACTATE BLD-SCNC: 2.1 MMOL/L (ref 0.4–1.9)
LYMPHOCYTES NFR FLD MANUAL: 47 %
MCH RBC QN AUTO: 36.8 PG (ref 26.5–33)
MCH RBC QN AUTO: 37 PG (ref 26.5–33)
MCHC RBC AUTO-ENTMCNC: 32.7 G/DL (ref 31.5–36.5)
MCHC RBC AUTO-ENTMCNC: 33.4 G/DL (ref 31.5–36.5)
MCV RBC AUTO: 111 FL (ref 78–100)
MCV RBC AUTO: 112 FL (ref 78–100)
MRSA DNA SPEC QL NAA+PROBE: NEGATIVE
NEUTS BAND NFR FLD MANUAL: 2 %
OTHER CELLS FLD MANUAL: 51 %
PLATELET # BLD AUTO: 76 10E9/L (ref 150–450)
PLATELET # BLD AUTO: 83 10E9/L (ref 150–450)
POTASSIUM SERPL-SCNC: 3.8 MMOL/L (ref 3.4–5.3)
POTASSIUM SERPL-SCNC: 3.9 MMOL/L (ref 3.4–5.3)
PROCALCITONIN SERPL-MCNC: 0.36 NG/ML
PROT SERPL-MCNC: 7.4 G/DL (ref 6.8–8.8)
RBC # BLD AUTO: 2.99 10E12/L (ref 4.4–5.9)
RBC # BLD AUTO: 3.05 10E12/L (ref 4.4–5.9)
SODIUM SERPL-SCNC: 134 MMOL/L (ref 133–144)
SODIUM SERPL-SCNC: 135 MMOL/L (ref 133–144)
SPECIMEN SOURCE FLD: NORMAL
SPECIMEN SOURCE: NORMAL
WBC # BLD AUTO: 7.6 10E9/L (ref 4–11)
WBC # BLD AUTO: 8.7 10E9/L (ref 4–11)
WBC # FLD AUTO: 97 /UL

## 2018-03-18 PROCEDURE — 85610 PROTHROMBIN TIME: CPT | Performed by: HOSPITALIST

## 2018-03-18 PROCEDURE — 80048 BASIC METABOLIC PNL TOTAL CA: CPT | Performed by: STUDENT IN AN ORGANIZED HEALTH CARE EDUCATION/TRAINING PROGRAM

## 2018-03-18 PROCEDURE — 0W9G3ZZ DRAINAGE OF PERITONEAL CAVITY, PERCUTANEOUS APPROACH: ICD-10-PCS | Performed by: HOSPITALIST

## 2018-03-18 PROCEDURE — 87633 RESP VIRUS 12-25 TARGETS: CPT | Performed by: STUDENT IN AN ORGANIZED HEALTH CARE EDUCATION/TRAINING PROGRAM

## 2018-03-18 PROCEDURE — HZ2ZZZZ DETOXIFICATION SERVICES FOR SUBSTANCE ABUSE TREATMENT: ICD-10-PCS | Performed by: HOSPITALIST

## 2018-03-18 PROCEDURE — 85027 COMPLETE CBC AUTOMATED: CPT | Performed by: STUDENT IN AN ORGANIZED HEALTH CARE EDUCATION/TRAINING PROGRAM

## 2018-03-18 PROCEDURE — 84145 PROCALCITONIN (PCT): CPT | Performed by: STUDENT IN AN ORGANIZED HEALTH CARE EDUCATION/TRAINING PROGRAM

## 2018-03-18 PROCEDURE — 76705 ECHO EXAM OF ABDOMEN: CPT

## 2018-03-18 PROCEDURE — 27210995 ZZH RX 272: Performed by: HOSPITALIST

## 2018-03-18 PROCEDURE — 83605 ASSAY OF LACTIC ACID: CPT | Performed by: PHYSICIAN ASSISTANT

## 2018-03-18 PROCEDURE — 25000128 H RX IP 250 OP 636: Performed by: HOSPITALIST

## 2018-03-18 PROCEDURE — 36415 COLL VENOUS BLD VENIPUNCTURE: CPT | Performed by: PHYSICIAN ASSISTANT

## 2018-03-18 PROCEDURE — 36415 COLL VENOUS BLD VENIPUNCTURE: CPT | Performed by: HOSPITALIST

## 2018-03-18 PROCEDURE — 25000128 H RX IP 250 OP 636: Performed by: PHYSICIAN ASSISTANT

## 2018-03-18 PROCEDURE — 99238 HOSP IP/OBS DSCHRG MGMT 30/<: CPT | Performed by: PSYCHIATRY & NEUROLOGY

## 2018-03-18 PROCEDURE — 36415 COLL VENOUS BLD VENIPUNCTURE: CPT | Performed by: STUDENT IN AN ORGANIZED HEALTH CARE EDUCATION/TRAINING PROGRAM

## 2018-03-18 PROCEDURE — 85610 PROTHROMBIN TIME: CPT | Performed by: PHYSICIAN ASSISTANT

## 2018-03-18 PROCEDURE — 99207 ZZC CDG-CODE INCORRECT PER BILLING BASED ON TIME: CPT | Performed by: PSYCHIATRY & NEUROLOGY

## 2018-03-18 PROCEDURE — 49082 ABD PARACENTESIS: CPT | Mod: GC | Performed by: HOSPITALIST

## 2018-03-18 PROCEDURE — 99223 1ST HOSP IP/OBS HIGH 75: CPT | Mod: 25 | Performed by: HOSPITALIST

## 2018-03-18 PROCEDURE — 87641 MR-STAPH DNA AMP PROBE: CPT | Performed by: STUDENT IN AN ORGANIZED HEALTH CARE EDUCATION/TRAINING PROGRAM

## 2018-03-18 PROCEDURE — 87640 STAPH A DNA AMP PROBE: CPT | Performed by: STUDENT IN AN ORGANIZED HEALTH CARE EDUCATION/TRAINING PROGRAM

## 2018-03-18 PROCEDURE — 85027 COMPLETE CBC AUTOMATED: CPT | Performed by: HOSPITALIST

## 2018-03-18 PROCEDURE — 89051 BODY FLUID CELL COUNT: CPT

## 2018-03-18 PROCEDURE — 87040 BLOOD CULTURE FOR BACTERIA: CPT | Performed by: HOSPITALIST

## 2018-03-18 PROCEDURE — 84145 PROCALCITONIN (PCT): CPT | Performed by: PHYSICIAN ASSISTANT

## 2018-03-18 PROCEDURE — 25000128 H RX IP 250 OP 636: Performed by: STUDENT IN AN ORGANIZED HEALTH CARE EDUCATION/TRAINING PROGRAM

## 2018-03-18 PROCEDURE — 99233 SBSQ HOSP IP/OBS HIGH 50: CPT | Performed by: PHYSICIAN ASSISTANT

## 2018-03-18 PROCEDURE — 12000001 ZZH R&B MED SURG/OB UMMC

## 2018-03-18 PROCEDURE — 71045 X-RAY EXAM CHEST 1 VIEW: CPT

## 2018-03-18 PROCEDURE — 87040 BLOOD CULTURE FOR BACTERIA: CPT | Performed by: STUDENT IN AN ORGANIZED HEALTH CARE EDUCATION/TRAINING PROGRAM

## 2018-03-18 PROCEDURE — 25000132 ZZH RX MED GY IP 250 OP 250 PS 637: Performed by: HOSPITALIST

## 2018-03-18 PROCEDURE — 80076 HEPATIC FUNCTION PANEL: CPT | Performed by: HOSPITALIST

## 2018-03-18 PROCEDURE — 83605 ASSAY OF LACTIC ACID: CPT | Performed by: STUDENT IN AN ORGANIZED HEALTH CARE EDUCATION/TRAINING PROGRAM

## 2018-03-18 PROCEDURE — 25000132 ZZH RX MED GY IP 250 OP 250 PS 637: Performed by: STUDENT IN AN ORGANIZED HEALTH CARE EDUCATION/TRAINING PROGRAM

## 2018-03-18 PROCEDURE — 83605 ASSAY OF LACTIC ACID: CPT | Performed by: HOSPITALIST

## 2018-03-18 PROCEDURE — 25000132 ZZH RX MED GY IP 250 OP 250 PS 637: Performed by: PSYCHIATRY & NEUROLOGY

## 2018-03-18 PROCEDURE — 80048 BASIC METABOLIC PNL TOTAL CA: CPT | Performed by: HOSPITALIST

## 2018-03-18 RX ORDER — PIPERACILLIN SODIUM, TAZOBACTAM SODIUM 3; .375 G/15ML; G/15ML
3.38 INJECTION, POWDER, LYOPHILIZED, FOR SOLUTION INTRAVENOUS EVERY 6 HOURS
Status: DISCONTINUED | OUTPATIENT
Start: 2018-03-18 | End: 2018-03-20

## 2018-03-18 RX ORDER — MULTIPLE VITAMINS W/ MINERALS TAB 9MG-400MCG
1 TAB ORAL DAILY
Qty: 30 EACH | Refills: 0 | Status: SHIPPED | OUTPATIENT
Start: 2018-03-19 | End: 2018-04-02

## 2018-03-18 RX ORDER — NALOXONE HYDROCHLORIDE 0.4 MG/ML
.1-.4 INJECTION, SOLUTION INTRAMUSCULAR; INTRAVENOUS; SUBCUTANEOUS
Status: DISCONTINUED | OUTPATIENT
Start: 2018-03-18 | End: 2018-03-24 | Stop reason: HOSPADM

## 2018-03-18 RX ORDER — SODIUM CHLORIDE 9 MG/ML
INJECTION, SOLUTION INTRAVENOUS CONTINUOUS
Status: DISCONTINUED | OUTPATIENT
Start: 2018-03-18 | End: 2018-03-18

## 2018-03-18 RX ORDER — PHYTONADIONE 5 MG/1
10 TABLET ORAL DAILY
Status: DISCONTINUED | OUTPATIENT
Start: 2018-03-18 | End: 2018-03-18 | Stop reason: HOSPADM

## 2018-03-18 RX ADMIN — LORAZEPAM 2 MG: 1 TABLET ORAL at 04:39

## 2018-03-18 RX ADMIN — GABAPENTIN 800 MG: 800 TABLET, FILM COATED ORAL at 09:32

## 2018-03-18 RX ADMIN — ALUMINUM HYDROXIDE, MAGNESIUM HYDROXIDE, AND DIMETHICONE 30 ML: 400; 400; 40 SUSPENSION ORAL at 01:07

## 2018-03-18 RX ADMIN — MULTIPLE VITAMINS W/ MINERALS TAB 1 TABLET: TAB at 09:30

## 2018-03-18 RX ADMIN — LORAZEPAM 2 MG: 1 TABLET ORAL at 01:05

## 2018-03-18 RX ADMIN — Medication 100 MG: at 09:32

## 2018-03-18 RX ADMIN — FOLIC ACID 1 MG: 1 TABLET ORAL at 09:31

## 2018-03-18 RX ADMIN — Medication 1 MG: at 22:24

## 2018-03-18 RX ADMIN — SODIUM CHLORIDE 1000 ML: 9 INJECTION, SOLUTION INTRAVENOUS at 20:29

## 2018-03-18 RX ADMIN — LORAZEPAM 1 MG: 1 TABLET ORAL at 12:28

## 2018-03-18 RX ADMIN — CLONIDINE HYDROCHLORIDE 0.1 MG: 0.1 TABLET ORAL at 02:52

## 2018-03-18 RX ADMIN — SODIUM CHLORIDE 1000 ML: 9 INJECTION, SOLUTION INTRAVENOUS at 09:55

## 2018-03-18 RX ADMIN — PIPERACILLIN AND TAZOBACTAM 3.38 G: 3; .375 INJECTION, POWDER, LYOPHILIZED, FOR SOLUTION INTRAVENOUS; PARENTERAL at 20:29

## 2018-03-18 RX ADMIN — PHYTONADIONE 10 MG: 5 TABLET ORAL at 09:31

## 2018-03-18 RX ADMIN — LORAZEPAM 1 MG: 1 TABLET ORAL at 09:32

## 2018-03-18 RX ADMIN — CEFTRIAXONE SODIUM 1 G: 10 INJECTION, POWDER, FOR SOLUTION INTRAVENOUS at 02:19

## 2018-03-18 ASSESSMENT — ACTIVITIES OF DAILY LIVING (ADL)
DRESS: SCRUBS (BEHAVIORAL HEALTH)
GROOMING: WITH ASSISTANCE
ORAL_HYGIENE: WITH ASSISTANCE

## 2018-03-18 NOTE — PLAN OF CARE
Problem: Patient Care Overview  Goal: Plan of Care/Patient Progress Review  Admitted from [facility] for elevated temps and possible SBP this afternoon. AOx4, Tmax 99.8. Elevated RR (low - 30's) - pt stable on RA. Up independently. Bruneian is main language, but pt able to communicate very well in English. Bruneian  provided for rounding/procedures. Diagnostic bedside para performed, cultures pending. Need to collect UA. Reg diet, good appetite. Calling appropriately and makes his needs known. Will continue POC.

## 2018-03-18 NOTE — DISCHARGE INSTRUCTIONS
Behavioral Discharge Planning and Instructions    Summary: You were admitted to Station 3A on 3- for detoxification from alcohol.     A medical exam was performed that included lab work. Case management assessment with recommendation for continuing care. You are being discharged to  on the [facility]    Main Diagnosis: Alcohol Dependence and withdrawal    Major Treatments, Procedures and Findings: Detoxification and stabilization. Chemical dependency assessment and referral    Symptoms to Report:  If  you experience feeling more anxiety, confusion, losing more sleep, mood declining or thoughts of suicide, notify your treatment team or notify your primary physician. IF ANY OF THE SYMPTOMS YOU ARE EXPERIENCING ARE A MEDICAL EMERGENCY CALL 911 IMMEDIATELY.     Lifestyle Adjustment: Adjust your lifestyle to get enough sleep, relaxation, exercise and  good nutrition. Continue to develop healthy coping skills to  decrease stress and promote a sober living environment. No use of alcohol, illegal drugs or addictive medications other than what is currently prescribed. AA, NA, and  Sponsor are excellent resources for support.     Psychiatry Follow-up:   Appointment Date/Time: ***   Psychiatrist/Primary Care Giver: ***   Address: ***   Phone Number: ***    Appointment Date/Time: ***   Therapist: ***   Address: ***   Phone Number: ***    Appointment Date/Time: ***   Support Group: ***   Address: ***   Phone Number: ***    Appointment Date/Time: ***   Other: ***   Address: ***   Phone Number: ***    If no appointments scheduled, explain ***    Resources:   Crisis Intervention: 361.499.1146 or 259-216-9821 (TTY: 614.137.8982).  Call anytime for help.  National [employer] Mental Illness (www.[place].noé.org): 774.354.2728 or 499-018-1040.  Alcoholics Anonymous (www.alcoholics-anonymous.org): Check your phone book for your local chapter.  Suicide Awareness Voices of Education (SAVE) (www.save.org): 848-077-SAVE  (1591)  National Suicide Prevention Line (www.mentalhealthmn.org): 393-269-GIZK (0898)  Mental Health Consumer/Survivor Network of [place] (www.mhcsn.net): 622.330.2464 or 140-894-4804  Mental Health Association of [place] (www.mentalhealth.org): 660.397.1135 or 994-358-9536     General Medication Instructions:   See your medication sheet(s) for instructions.   Take all medicines as directed.  Make no changes unless your doctor suggests them.   Go to all your doctor visits.  Be sure to have all your required lab tests. This way, your medicines can be refilled on time.  Do not use any drugs not prescribed by your doctor.  Avoid alcohol.    Please Note:  If you have any questions at anytime after you are discharged please call the [facility] [place] detox unit 3A at [phone].  Please take this discharge folder with you to all your follow up appointments, it contains your lab results, diagnosis, medication list and discharge recommendations.

## 2018-03-18 NOTE — IP AVS SNAPSHOT
MRN:[id number]                      After Visit Summary   3/18/2018    Sylvain Pitts    MRN: [id number]           Thank you!     Thank you for choosing [facility] for your care. Our goal is always to provide you with excellent care. Hearing back from our patients is one way we can continue to improve our services. Please take a few minutes to complete the written survey that you may receive in the mail after you visit with us. Thank you!        Patient Information     Date Of Birth          1980        Designated Caregiver       Most Recent Value    Caregiver    Will someone help with your care after discharge? yes    Name of designated caregiver Namrata    Phone number of caregiver [phone]    Caregiver address lives with patient      About your hospital stay     You were admitted on:  March 18, 2018 You last received care in the:  [address] [facility]    You were discharged on:  March 24, 2018        Reason for your hospital stay       Pneumonia                  Who to Call     For medical emergencies, please call 911.  For non-urgent questions about your medical care, please call your primary care provider or clinic, [phone]          Attending Provider     Provider Specialty    Shavon Ruvalcaba MD Internal Medicine    [facility]    Leanne Pires MD Internal Medicine       Primary Care Provider Office Phone # Fax #    [facility] [phone]       When to contact your care team       Call your primary doctor if you have any of the following: fever, increasing pain, difficulty breathing.                  After Care Instructions     Activity       Your activity upon discharge: activity as tolerated            Diet       Follow this diet upon discharge: Orders Placed This Encounter      Combination Diet Regular Diet Adult                  Follow-up Appointments     Adult [facility] Follow-up and  recommended labs and tests       Follow up with primary care provider, [facility], within 1 month for a new chest x-ray.  The following labs/tests are recommended: Chest x-ray.  Follow up with GI as outpatient for cirrhosis.    Appointments on [place] and/or [facility] (with [facility] or [facility] provider or service). Call [phone] if you haven't heard regarding these appointments within 7 days of discharge.                  Your next 10 appointments already scheduled     Apr 02, 2018  9:00 AM CDT   LAB with  LAB   [facility] ([facility])    [address]  1st Floor  [facility] [address] [phone]           Please do not eat 10-12 hours before your appointment if you are coming in fasting for labs on lipids, cholesterol, or glucose (sugar). This does not apply to pregnant women. Water, hot tea and black coffee (with nothing added) are okay. Do not drink other fluids, diet soda or chew gum.            Apr 02, 2018 10:00 AM CDT   (Arrive by 9:45 AM)   New General Liver with Marcela Monge MD   [facility] Hepatology ([facility])    [address]  [address]  [facility] [address] [phone]              Additional Services     GASTROENTEROLOGY ADULT REF CONSULT ONLY       Preferred Location: [facility], [facility] [phone]      Please be aware that coverage of these services is subject to the terms and limitations of your health insurance plan.  Call member services at your health plan with any benefit or coverage questions.  Any procedures must be performed at a [place] [facility] facility OR coordinated by your clinic's referral office.    Please bring the following with you to your appointment:    (1) Any X-Rays, CTs or MRIs which have been performed.  Contact the facility where they were done to arrange for  prior to your scheduled appointment.    (2) List of current medications   (3) This referral  "request   (4) Any documents/labs given to you for this referral                  Pending Results     Date and Time Order Name Status Description    3/23/2018 2245 US Lower Extremity Venous Duplex Right Port In process     3/21/2018 1355 Fungus Culture, non-blood Preliminary     3/21/2018 1355 AFB Culture Non Blood Preliminary     3/21/2018 1355 Fluid Culture Aerobic Bacterial Preliminary     3/20/2018 1839 Blood culture Preliminary     3/20/2018 1839 Blood culture Preliminary             Statement of Approval     Ordered          18 0856  I have reviewed and agree with all the recommendations and orders detailed in this document.  EFFECTIVE NOW     Approved and electronically signed by:  Ellen Ta MD             Admission Information     Date & Time Provider Department Dept. Phone    3/18/2018 Leanne Pires MD [address] [facility] [phone]      Your Vitals Were     Blood Pressure Pulse Temperature Respirations Weight Pulse Oximetry    112/56 (BP Location: Right arm) 93 98.6  F (37  C) (Oral) 18 102.5 kg (226 lb) 93%      MyChart Information     [employer] lets you send messages to your doctor, view your test results, renew your prescriptions, schedule appointments and more. To sign up, go to [email or web address] . Click on \"Log in\" on the left side of the screen, which will take you to the Welcome page. Then click on \"Sign up Now\" on the right side of the page.     You will be asked to enter the access code listed below, as well as some personal information. Please follow the directions to create your username and password.     Your access code is: [email or web address]  Expires: 2018 11:16 AM     Your access code will  in 90 days. If you need help or a new code, please call your [facility] clinic or [phone].        Care EveryWhere ID     This is your Care EveryWhere ID. This could be used by other organizations to access your [facility] medical records  [id number]        Equal " Access to Services     [facility]: Hadii aad ku hadekaterinanoel Soevelin, waaxda luqadaha, qaybta kaalmada donta, loretta gibson. So [facility] [phone].    ATENCIÓN: Si habla español, tiene a henriquez disposición servicios gratuitos de asistencia lingüística. Llame al [phone].    We comply with applicable federal civil rights laws and [place] laws. We do not discriminate on the basis of race, color, national origin, age, disability, sex, sexual orientation, or gender identity.               Review of your medicines      START taking        Dose / Directions    amoxicillin-clavulanate 875-125 MG per tablet   Commonly known as:  AUGMENTIN   Used for:  HCAP (healthcare-associated pneumonia)        Dose:  1 tablet   Take 1 tablet by mouth 2 times daily for 6 days   Quantity:  12 tablet   Refills:  0       melatonin 3 MG tablet   Used for:  Sleep difficulties        Dose:  3 mg   Take 1 tablet (3 mg) by mouth nightly as needed for sleep   Quantity:  30 tablet   Refills:  3         CONTINUE these medicines which have NOT CHANGED        Dose / Directions    multivitamin, therapeutic with minerals Tabs tablet   Used for:  Alcohol abuse        Dose:  1 tablet   Take 1 tablet by mouth daily   Quantity:  30 each   Refills:  0         STOP taking     cloNIDine 0.1 MG tablet   Commonly known as:  CATAPRES           gabapentin 800 MG tablet   Commonly known as:  NEURONTIN                Where to get your medicines      These medications were sent to [facility] [place], [place] - [address]  [address] [address]     Phone:  [phone]     amoxicillin-clavulanate 875-125 MG per tablet    melatonin 3 MG tablet                Protect others around you: Learn how to safely use, store and throw away your medicines at www.disposemymeds.org.        ANTIBIOTIC INSTRUCTION     You've Been Prescribed an Antibiotic - Now What?  Your healthcare team thinks that you  or your loved one might have an infection. Some infections can be treated with antibiotics, which are powerful, life-saving drugs. Like all medications, antibiotics have side effects and should only be used when necessary. There are some important things you should know about your antibiotic treatment.      Your healthcare team may run tests before you start taking an antibiotic.    Your team may take samples (e.g., from your blood, urine or other areas) to run tests to look for bacteria. These test can be important to determine if you need an antibiotic at all and, if you do, which antibiotic will work best.      Within a few days, your healthcare team might change or even stop your antibiotic.    Your team may start you on an antibiotic while they are working to find out what is making you sick.    Your team might change your antibiotic because test results show that a different antibiotic would be better to treat your infection.    In some cases, once your team has more information, they learn that you do not need an antibiotic at all. They may find out that you don't have an infection, or that the antibiotic you're taking won't work against your infection. For example, an infection caused by a virus can't be treated with antibiotics. Staying on an antibiotic when you don't need it is more likely to be harmful than helpful.      You may experience side effects from your antibiotic.    Like all medications, antibiotics have side effects. Some of these can be serious.    Let you healthcare team know if you have any known allergies when you are admitted to the hospital.    One significant side effect of nearly all antibiotics is the risk of severe and sometimes deadly diarrhea caused by Clostridium difficile (C. Difficile). This occurs when a person takes antibiotics because some good germs are destroyed. Antibiotic use allows C. diificile to take over, putting patients at high risk for this serious infection.    As  a patient or caregiver, it is important to understand your or your loved one's antibiotic treatment. It is especially important for caregivers to speak up when patients can't speak for themselves. Here are some important questions to ask your healthcare team.    What infection is this antibiotic treating and how do you know I have that infection?    What side effects might occur from this antibiotic?    How long will I need to take this antibiotic?    Is it safe to take this antibiotic with other medications or supplements (e.g., vitamins) that I am taking?     Are there any special directions I need to know about taking this antibiotic? For example, should I take it with food?    How will I be monitored to know whether my infection is responding to the antibiotic?    What tests may help to make sure the right antibiotic is prescribed for me?      Information provided by:  www.cdc.gov/getsmart  U.S. Department of Health and Human Services  Centers for disease Control and Prevention  National Center for Emerging and Zoonotic Infectious Diseases  Division of Healthcare Quality Promotion             Medication List: This is a list of all your medications and when to take them. Check marks below indicate your daily home schedule. Keep this list as a reference.      Medications           Morning Afternoon Evening Bedtime As Needed    amoxicillin-clavulanate 875-125 MG per tablet   Commonly known as:  AUGMENTIN   Take 1 tablet by mouth 2 times daily for 6 days   Last time this was given:  1 tablet on 3/20/2018  1:37 PM                                melatonin 3 MG tablet   Take 1 tablet (3 mg) by mouth nightly as needed for sleep   Last time this was given:  3 mg on 3/23/2018 11:40 PM                                multivitamin, therapeutic with minerals Tabs tablet   Take 1 tablet by mouth daily

## 2018-03-18 NOTE — DISCHARGE SUMMARY
Admit Date:     2018   Discharge Date:     3/18/18      IDENTIFYING INORMATION:   1.  Alcohol use disorder, severe.   2.  Alcoholic cirrhosis.        Please see detailed admission note by Dr. York on 2018.      HOSPITAL COURSE:  The patient was seen today and being discharged back to Medicine.  he has been detoxed off alcohol using MSSA protocol on ativan.  Overnight, he had developed a temperature.  He had diarrhea and ultrasound showed a small amount of ascites.  There was a concern of spontaneous bacterial peritonitis.  He was transferred back to [place] given his liver disease and complexity of care.  During this hospitalization, the patient was seen by Maria Del Carmen Florian on 3/17/2018.      DISCHARGE DISPOSITION:  The patient is going back to medicine     DISCHARGE MENTAL STATUS EXAMINATION:  The patient is alert, oriented x 3.  Recent and remote memory, language and fund of knowledge are all adequate.  No loose associations.  The patient does not have any suicidal or homicidal ideation, plan or intent.      The patient is medically decompensated and was sent back to Medicine.       Urban Pitts   Home Medication Instructions PAT:[id number]    Printed on:18 1636   Medication Information                      cloNIDine (CATAPRES) 0.1 MG tablet  Take 1 tablet (0.1 mg) by mouth 2 times daily for 5 days             gabapentin (NEURONTIN) 800 MG tablet  Take 1 tablet (800 mg) by mouth 3 times daily for 5 days             multivitamin, therapeutic with minerals (THERA-VIT-M) TABS tablet  Take 1 tablet by mouth daily                  PIERRE YORK MD             D: 2018   T: 2018   MT:       Name:     URBAN PITTS   MRN:      [id number]        Account:        [id number]   :      1980           Admit Date:     2018                                  Discharge Date:       Document: [id number]

## 2018-03-18 NOTE — PROGRESS NOTES
Internal Medicine Progress Note      Assessment and plan:  Sylvain Pitts is a 38 year old male with a history of decompensated etoh cirrhosis and alcohol abuse who was transferred from [facility] to station 3A detox on 3/16. Internal medicine asked to see patient due to fever 101.2 overnight.    SIRS: Temp 101.2,  overnight. No localizing s/s of acute infection. Diarrhea x1 this am. US 3/14 only small amount ascites. WBC and lactic acid normal. Treated with Ceftriaxone x1 for concern for SBP.  - Transfer to [place] given fever with liver disease and complexity of care that is beyond the scope of detox management   - RVP, CXR, UA  - 1L bolus now  - US to assess for ascites  - C diff if worsening diarrhea  - Follow cultures   ** Addendum: CXR with bibasilar opacities, atelectasis vs infection. Temp 100.7. On room air.   - Add on procal  - Give Augmentin prior to transfer for anaerobic coverage    Decompensated etoh cirrhosis, alcohol abuse: Admitted to medicine with alcoholic hepatitis. Per GI consult, steroids considered given Maddrey of 49, were not started due to anxiety. HBV, HCV negative. HAV indeterminate. US 3/14 small amount ascites, cirrhosis, portal HTN. Spiked fever 3/18 as above. Tbili 16.8 (14.4),  (199), ALT 45 (59),  (157). MELD 26  - GI consult upon transfer to [place]  - Repeat US as above to assess for ascites   - Daily MELD labs    II/VI ROSA MARIA: Not previously mentioned in medicine notes. Asymptomatic.   - Suggest echo, will hold off given that patient transferring to [place] today      Objective:  /79  Pulse 103  Temp 100.1  F (37.8  C) (Oral)  Resp 16    Vitals signs reviewed and noted    GENERAL: Alert and oriented x3. Diaphoretic  HEENT: Icteric sclera. Mucous membranes dry   CV: RRR. S1, S2. II/VI ROSA MARIA  RESPIRATORY: Effort normal on room air. Lungs CTAB with no wheezing, rales, rhonchi.   GI: Mild to moderate distention. A  Abdomen soft with normoactive  "bowel sounds present in all quadrants. No tenderness, rebound, guarding.   NEUROLOGICAL: No focal deficits. Moves all extremities.    EXTREMITIES: No peripheral edema. Intact bilateral pedal pulses.   SKIN: No jaundice. No rashes.     Pertinent labs and procedures were reviewed.     Subjective:   Sylvain Pitts is a 38 year old male with a history of decompensated etoh cirrhosis and alcohol abuse who was transferred from [facility] to station 3A detox on 3/16. Internal medicine asked to see patient due to fever 101.2 overnight.    Patient reports being told he had a fever overnight. He was mildly sweaty, but no other symptoms. Feeling better now. Tolerating diet. Denies dyspnea or chest pain. No URI symptoms. Reports non-productive cough for a \"few\" days. No urinary symptoms. Reports diarrhea/loose stool x1 this am. No new rashes. Denies RUQ pain, abdominal pain, N/V, increased distention or fullness. No new confusion.     HPI obtained via interpretive services.     Maria Del Carmen Florian PA-C  Internal Medicine  [phone]      "

## 2018-03-18 NOTE — PROGRESS NOTES
Discharged with personal belongings via gurney to be transported to  on the [facility] for continued care. Denies suicide ideation and thoughts of self harm.

## 2018-03-18 NOTE — IP AVS SNAPSHOT
[address] [address]    Phone:  [phone]                                       After Visit Summary   3/18/2018    Sylvain Pitts    MRN: [id number]           After Visit Summary Signature Page     I have received my discharge instructions, and my questions have been answered. I have discussed any challenges I see with this plan with the nurse or doctor.    ..........................................................................................................................................  Patient/Patient Representative Signature      ..........................................................................................................................................  Patient Representative Print Name and Relationship to Patient    ..................................................               ................................................  Date                                            Time    ..........................................................................................................................................  Reviewed by Signature/Title    ...................................................              ..............................................  Date                                                            Time

## 2018-03-18 NOTE — PROCEDURES
PROCEDURE:   Ultrasound Guided Diagnostic Paracentesis.    INDICATION:   Ascites.    PROCEDURE :   Nikkie Campos    SUPERVISED BY:  Dr. Garay    CONSENT:   Informed consent was obtained after risks and benefits were explained at length.     PROCEDURE SUMMARY:   Ultrasound was used to find a nice pocket of ascites and marked.   The area of the LL abdomen was prepped in a sterile fashion using chlorhexidine scrub. The paracentesis catheter was inserted and advanced with negative pressure. No blood was aspirated. Approximately 10 mL of ascitic fluid was aspirated and sent for laboratory analysis. The patient tolerated the procedure well without any immediate complications.     Dr. Garay was present for the key portions of the procedure.    ESTIMATED BLOOD LOSS: 0mL

## 2018-03-18 NOTE — PROGRESS NOTES
Called that patient is spiking fever, BP normal as well as HR  Chart reviewed, patient with alcoholic liver disease and untreated alcoholic hepatitis   Plan:  Will order one time dose of Ceftriaxone in case this is SBP  Should get paracentesis in am. Will order UA and CXR  Will re-check labs  Alcoholic hepatitis should be treated due to its high mortality with prednisolone if he has no SBP or infection elsewhere, if prednisone contraindicated please consider pentoxifylline

## 2018-03-18 NOTE — H&P
[facility]    Internal Medicine History and Physical - [facility] Service       Date of Admission:  3/18/2018    Chief Complaint   Fever    History is obtained from the patient    History of Present Illness   Sylvain Pitts is a 38 year old male who has a history of alcoholic cirrhosis, kidney stones, and anxiety and is admitted for fever and alcohol cessation.    He was admitted to [facility] on 3/14 for a new diagnosis of alcoholic cirrhosis and for alcohol withdrawal. He had been drinking up to 15 shots of vodka or whiskey daily for the past 7 months. His last drink was on 3/13. He had also noticed increasing yellowing of his skin and eyes for the past 3 months. While he was admitted, he was evaluated by GI and given outpatient follow-up. He was then transferred to detox for alcohol withdrawal on 3/16. He was detoxed off alcohol using MSSA protocol on valium. Overnight on 3/17-3/18, the patient became febrile and had one episode of diarrhea. He was given ceftriaxone. Blood cultures were negative. A chest x-ray showed bibasilar opacities consistent with atelectasis/infection. An abdominal ultrasound showed a small volume of ascites. He was transferred back to the [place] due to concern for possible SBP.    Today the patient denies chest pain, shortness of breath, abdominal pain, nausea/vomiting.    Review of Systems   The 10 point Review of Systems is negative other than noted in the HPI or here.    Past Medical History    I have reviewed this patient's medical history and updated it with pertinent information if needed.   Past Medical History:   Diagnosis Date     Anxiety      Kidney stones         Past Surgical History   I have reviewed this patient's surgical history and updated it with pertinent information if needed.  Past Surgical History:   Procedure Laterality Date     HEAD & NECK SURGERY      head surgery when he was 14 years old.        Social History   Social History    Substance Use Topics     Smoking status: Light Tobacco Smoker     Smokeless tobacco: Never Used     Alcohol use Yes      Comment: every day       Family History   I have reviewed this patient's family history and updated it with pertinent information if needed.   No family history on file.    Prior to Admission Medications   Prior to Admission Medications   Prescriptions Last Dose Informant Patient Reported? Taking?   cloNIDine (CATAPRES) 0.1 MG tablet   No No   Sig: Take 1 tablet (0.1 mg) by mouth 2 times daily for 5 days   gabapentin (NEURONTIN) 800 MG tablet   No No   Sig: Take 1 tablet (800 mg) by mouth 3 times daily for 5 days   multivitamin, therapeutic with minerals (THERA-VIT-M) TABS tablet   No No   Sig: Take 1 tablet by mouth daily      Facility-Administered Medications: None     Allergies   No Known Allergies    Physical Exam   Vital Signs: Temp: 97.1  F (36.2  C) Temp src: Oral BP: 129/74 Pulse: 104   Resp: 16 SpO2: 97 % O2 Device: None (Room air)    Weight: 226 lbs 0 oz    General Appearance: Standing by side of bed, no acute distress.  Eyes: Sclera icteric  HEENT: NC/AT, trachea midline  Respiratory: Clear to auscultation bilaterally  Cardiovascular: Regular rate and rhythm, S1/S2, no m/r/g  GI: Abdomen distended, soft, not tender  Lymph/Hematologic: No lymphadenopathy  Skin: Jaundiced  Musculoskeletal: Moves 4 extremities to request  Neurologic: AOx3, CN II-XII grossly intact  Psychiatric: Normal mood and affect    Assessment & Plan   Sylvain Pitts is a 38 year old male admitted on 3/18/2018. He has a history of alcoholic cirrhosis, anxiety, and kidney stones and is admitted for fever.    # Fever  Possibly due to SBP. No dysuria, but will obtain U/A to rule out UTI. No symptoms of pneumonia, lung sounds are clear, and chest x-ray most likely shows atelectasis rather than infection. Will see if patient continues to have diarrhea here, as it is unclear whether patient had diarrhea vs a loose  stool.  - Diagnostic paracentesis  - U/A  - Continue ceftriaxone    # Alcohol withdrawal  Patient is much improved from when he was first admitted on 3/14.  - Continue MSSA protocol    # Alcoholic cirrhosis  Follow-up with GI within one month as an outpatient.    Diet: Combination Diet Regular Diet Adult  Fluids: N/A  DVT Prophylaxis: Low Risk/Ambulatory with no VTE prophylaxis indicated  Code Status: Full Code    Disposition Plan   Expected discharge: Tomorrow; recommended to prior living arrangement once antibiotic plan established.     Entered: Cheikh Raymond 03/18/2018, 2:45 PM   Information in the above section will display in the discharge planner report.    The patient was discussed with Dr. Garay.    Cheikh Raymond  [address]   Pager: [phone]  Please see sticky note for cross cover information      Data   Data     Recent Labs  Lab 03/18/18  0946 03/18/18  0242 03/15/18  0720  03/14/18  1837   WBC  --  8.7 6.6  --  8.1   HGB  --  11.3* 11.2*  --  12.0*   MCV  --  111* 112*  --  108*   PLT  --  83* 66*  --  79*   INR 1.84* 1.77* 1.74*  < >  --    NA  --  134 139  --  140   POTASSIUM  --  3.9 3.7  --  4.0   CHLORIDE  --  102 106  --  106   CO2  --  22 19*  --  23   BUN  --  12 4*  --  4*   CR  --  0.66 0.53*  --  0.54*   ANIONGAP  --  10 14  --  11   JOHANN  --  8.4* 8.0*  --  8.1*   GLC  --  159* 107*  --  117*   ALBUMIN  --  2.5* 2.5*  --  2.9*   PROTTOTAL  --  7.4 7.3  --  8.2   BILITOTAL  --  16.8* 14.4*  --  14.4*   ALKPHOS  --  152* 157*  --  180*   ALT  --  45 59  --  59   AST  --  106* 199*  --  211*   LIPASE  --   --   --   --  479*   < > = values in this interval not displayed.

## 2018-03-19 LAB
BACTERIA SPEC CULT: ABNORMAL
FLUAV H1 2009 PAND RNA SPEC QL NAA+PROBE: NEGATIVE
FLUAV H1 RNA SPEC QL NAA+PROBE: NEGATIVE
FLUAV H3 RNA SPEC QL NAA+PROBE: NEGATIVE
FLUAV RNA SPEC QL NAA+PROBE: NEGATIVE
FLUBV RNA SPEC QL NAA+PROBE: NEGATIVE
GRAM STN SPEC: ABNORMAL
HADV DNA SPEC QL NAA+PROBE: NEGATIVE
HADV DNA SPEC QL NAA+PROBE: NEGATIVE
HMPV RNA SPEC QL NAA+PROBE: NEGATIVE
HPIV1 RNA SPEC QL NAA+PROBE: NEGATIVE
HPIV2 RNA SPEC QL NAA+PROBE: NEGATIVE
HPIV3 RNA SPEC QL NAA+PROBE: NEGATIVE
Lab: ABNORMAL
MICROBIOLOGIST REVIEW: NORMAL
PROCALCITONIN SERPL-MCNC: 0.31 NG/ML
RHINOVIRUS RNA SPEC QL NAA+PROBE: NEGATIVE
RSV RNA SPEC QL NAA+PROBE: NEGATIVE
RSV RNA SPEC QL NAA+PROBE: NEGATIVE
SPECIMEN SOURCE: ABNORMAL
SPECIMEN SOURCE: ABNORMAL
SPECIMEN SOURCE: NORMAL

## 2018-03-19 PROCEDURE — 25000132 ZZH RX MED GY IP 250 OP 250 PS 637: Performed by: MARRIAGE & FAMILY THERAPIST

## 2018-03-19 PROCEDURE — 12000001 ZZH R&B MED SURG/OB UMMC

## 2018-03-19 PROCEDURE — 87205 SMEAR GRAM STAIN: CPT | Performed by: STUDENT IN AN ORGANIZED HEALTH CARE EDUCATION/TRAINING PROGRAM

## 2018-03-19 PROCEDURE — 99233 SBSQ HOSP IP/OBS HIGH 50: CPT | Mod: GC | Performed by: INTERNAL MEDICINE

## 2018-03-19 PROCEDURE — 25000132 ZZH RX MED GY IP 250 OP 250 PS 637

## 2018-03-19 PROCEDURE — 25000128 H RX IP 250 OP 636: Performed by: STUDENT IN AN ORGANIZED HEALTH CARE EDUCATION/TRAINING PROGRAM

## 2018-03-19 RX ORDER — LORAZEPAM 0.5 MG/1
0.5 TABLET ORAL EVERY 4 HOURS PRN
Status: DISCONTINUED | OUTPATIENT
Start: 2018-03-19 | End: 2018-03-21

## 2018-03-19 RX ORDER — LANOLIN ALCOHOL/MO/W.PET/CERES
3 CREAM (GRAM) TOPICAL
Status: DISCONTINUED | OUTPATIENT
Start: 2018-03-19 | End: 2018-03-24 | Stop reason: HOSPADM

## 2018-03-19 RX ADMIN — MELATONIN 3 MG: 3 TAB ORAL at 22:07

## 2018-03-19 RX ADMIN — PIPERACILLIN AND TAZOBACTAM 3.38 G: 3; .375 INJECTION, POWDER, LYOPHILIZED, FOR SOLUTION INTRAVENOUS; PARENTERAL at 08:08

## 2018-03-19 RX ADMIN — LORAZEPAM 0.5 MG: 0.5 TABLET ORAL at 14:40

## 2018-03-19 RX ADMIN — PIPERACILLIN AND TAZOBACTAM 3.38 G: 3; .375 INJECTION, POWDER, LYOPHILIZED, FOR SOLUTION INTRAVENOUS; PARENTERAL at 13:46

## 2018-03-19 RX ADMIN — PIPERACILLIN AND TAZOBACTAM 3.38 G: 3; .375 INJECTION, POWDER, LYOPHILIZED, FOR SOLUTION INTRAVENOUS; PARENTERAL at 20:54

## 2018-03-19 RX ADMIN — LORAZEPAM 0.5 MG: 0.5 TABLET ORAL at 20:58

## 2018-03-19 RX ADMIN — MELATONIN 3 MG: 3 TAB ORAL at 03:05

## 2018-03-19 RX ADMIN — PIPERACILLIN AND TAZOBACTAM 3.38 G: 3; .375 INJECTION, POWDER, LYOPHILIZED, FOR SOLUTION INTRAVENOUS; PARENTERAL at 02:20

## 2018-03-19 NOTE — PLAN OF CARE
Problem: Patient Care Overview  Goal: Plan of Care/Patient Progress Review  Outcome: No Change  Pt A&O, primary language Grenadian. Pt on RA, febrile, tachycardic and tachypnea. TMAX 99.7. Pt triggered sepsis, Lactate 2.1, MD notified. Blood cx, sputum, UA, MRSA swab and RSV swab ordered. 1 L bolus given, IV zosyn started q6h. Still need UA, pt aware. Voided x1, but mixed with large amount of stool. Denies pain. Pt c/o not being able to sleep, PRN melatonin given with no help, MD notified and additional dose ordered. Up ind in room. Tolerating reg diet with fair appetite. Able to make needs known. Will continue to monitor and follow POC.

## 2018-03-19 NOTE — PLAN OF CARE
"Problem: Patient Care Overview  Goal: Plan of Care/Patient Progress Review  Outcome: No Change  Pt AO. VSS on RA, intermittently tachy. Jaundiced. Up IND to SBA, ambulated halls with a friend/family member. Denies pain. Nonproductive cough. Tolerating PO with fair appetite. Pt reports void x1 this AM, still need UA/UC, pt is aware. No BM since overnoc. Msg sent to MD per pt request for something to \"help relax\", MSSA 5, no PRNs available. MRSA and RVP neg, iso d/c'd.           "

## 2018-03-19 NOTE — PROGRESS NOTES
[facility], [place]    Sepsis Evaluation Progress Note    Date of Service: 03/18/2018    I was called to see Sylvain Pitts due to elevated lactic acid.     Physical Exam    Vital Signs:  Temp: 98.8  F (37.1  C) Temp src: Axillary BP: 127/71 Pulse: 109   Resp: 24 SpO2: 94 % O2 Device: None (Room air)      Lab:  Lactic Acid   Date Value Ref Range Status   03/18/2018 1.9 0.7 - 2.0 mmol/L Final       The patient is at baseline mental status.    The rest of their physical exam is significant for:  HEENT: dry mucous membranes   Res: coarse breath sounds   Abd: distended, soft, non-tender, abdomen      Assessment and Plan    The SIRS and exam findings are likely due to pneumonia, will broaden to zosyn.     Disposition: The patient will remain on the current unit. We will continue to monitor this patient closely.    Denton Freeman MD

## 2018-03-19 NOTE — PROGRESS NOTES
[facility], [place]    Internal Medicine Progress Note - [facility] Service    Main Plans for Today   - Continue Zosyn     Assessment & Plan   Sylvain Pitts is a 38 year old male admitted on 3/18/2018. He has a history of alcoholic cirrhosis, anxiety, and kidney stones and is admitted for HCAP.     # HCAP vs Aspiration PNA  Patient was febrile in detox, chest x-ray shows atelectasis vs. Pneumonia/aspiration. Procal 0.31. Lactate 2.1. Coarse breath sounds. MRSA swab negative. Sputum culture pending. Diagnostic para negative for SBP. No dysuria, but will obtain U/A to rule out UTI.  - Continue zosyn  - Will re-evaluate tomorrow for possible oral abx     # Alcohol withdrawal  Patient is much improved from when he was first admitted on 3/14 and is at his baseline mental status.     # Alcoholic cirrhosis  Follow-up with GI within one month as an outpatient.    Diet: Combination Diet Regular Diet Adult  Fluids: N/A  DVT Prophylaxis: Low Risk/Ambulatory with no VTE prophylaxis indicated  Code Status: Full Code    Disposition Plan   Expected discharge: 2 - 3 days, recommended to prior living arrangement once antibiotic plan established.     Entered: Cheikh Raymond 03/19/2018, 6:56 AM   Information in the above section will display in the discharge planner report.      The patient's care was discussed with the Attending Physician, Dr. Monge.    Cheikh Raymond  [facility]: 2  Pager: [phone]  Please see sticky note for cross cover information    Interval History   Overnight, patient triggered SIRS criteria with elevated lactate. He was started on zosyn for possible HCAP.    Complains of non-productive cough this morning. Denies chest pain, shortness of breath, abdominal pain, nausea.    Physical Exam   Vital Signs: Temp: 96.3  F (35.7  C) Temp src: Oral BP: 117/70 Pulse: 98   Resp: 20 SpO2: 93 % O2 Device: None (Room air)    Weight: 226 lbs 0 oz  General Appearance: Resting  comfortably in bed, no acute distress.  Eyes: Icteric sclera  Respiratory: Mildly coarse breath sounds bilaterally  Cardiovascular: Regular rate and rhythm, S1/S2, no m/r/g  GI: Abdomen distended, soft, not tender  Skin: Jaundiced  Other: AOx3         Data   Data     Recent Labs  Lab 03/18/18  1620 03/18/18  0946 03/18/18  0242 03/15/18  0720  03/14/18  1837   WBC 7.6  --  8.7 6.6  --  8.1   HGB 11.0*  --  11.3* 11.2*  --  12.0*   *  --  111* 112*  --  108*   PLT 76*  --  83* 66*  --  79*   INR  --  1.84* 1.77* 1.74*  < >  --      --  134 139  --  140   POTASSIUM 3.8  --  3.9 3.7  --  4.0   CHLORIDE 104  --  102 106  --  106   CO2 23  --  22 19*  --  23   BUN 10  --  12 4*  --  4*   CR 0.62*  --  0.66 0.53*  --  0.54*   ANIONGAP 8  --  10 14  --  11   JOHANN 8.0*  --  8.4* 8.0*  --  8.1*   *  --  159* 107*  --  117*   ALBUMIN  --   --  2.5* 2.5*  --  2.9*   PROTTOTAL  --   --  7.4 7.3  --  8.2   BILITOTAL  --   --  16.8* 14.4*  --  14.4*   ALKPHOS  --   --  152* 157*  --  180*   ALT  --   --  45 59  --  59   AST  --   --  106* 199*  --  211*   LIPASE  --   --   --   --   --  479*   < > = values in this interval not displayed.    Pt was seen and examined by me; confirmed lung exam findings of coarse breath sounds and inspiratory crackles right base.   I reviewed labs, vitals, imaging.  I agree with the detailed A/P as documented above.    Leanne Arrington MD

## 2018-03-20 ENCOUNTER — OFFICE VISIT (OUTPATIENT)
Dept: INTERPRETER SERVICES | Facility: CLINIC | Age: 38
End: 2018-03-20
Payer: COMMERCIAL

## 2018-03-20 LAB
ALBUMIN UR-MCNC: ABNORMAL MG/DL
ALBUMIN UR-MCNC: NEGATIVE MG/DL
AMORPH CRY #/AREA URNS HPF: ABNORMAL /HPF
APPEARANCE UR: ABNORMAL
APPEARANCE UR: CLEAR
BILIRUB UR QL STRIP: ABNORMAL
BILIRUB UR QL STRIP: ABNORMAL
COLOR UR AUTO: ABNORMAL
COLOR UR AUTO: ABNORMAL
ERYTHROCYTE [DISTWIDTH] IN BLOOD BY AUTOMATED COUNT: 17.6 % (ref 10–15)
GLUCOSE UR STRIP-MCNC: ABNORMAL MG/DL
GLUCOSE UR STRIP-MCNC: NEGATIVE MG/DL
HCT VFR BLD AUTO: 34.8 % (ref 40–53)
HGB BLD-MCNC: 11.2 G/DL (ref 13.3–17.7)
HGB UR QL STRIP: ABNORMAL
HGB UR QL STRIP: NEGATIVE
KETONES UR STRIP-MCNC: ABNORMAL MG/DL
KETONES UR STRIP-MCNC: NEGATIVE MG/DL
LACTATE BLD-SCNC: 1.6 MMOL/L (ref 0.4–1.9)
LEUKOCYTE ESTERASE UR QL STRIP: ABNORMAL
LEUKOCYTE ESTERASE UR QL STRIP: NEGATIVE
MCH RBC QN AUTO: 36.6 PG (ref 26.5–33)
MCHC RBC AUTO-ENTMCNC: 32.2 G/DL (ref 31.5–36.5)
MCV RBC AUTO: 114 FL (ref 78–100)
NITRATE UR QL: ABNORMAL
NITRATE UR QL: NEGATIVE
PH UR STRIP: 5.5 PH (ref 5–7)
PH UR STRIP: ABNORMAL PH (ref 5–7)
PLATELET # BLD AUTO: 84 10E9/L (ref 150–450)
RBC # BLD AUTO: 3.06 10E12/L (ref 4.4–5.9)
RBC #/AREA URNS AUTO: 0 /HPF (ref 0–2)
RBC #/AREA URNS AUTO: ABNORMAL /HPF (ref 0–2)
SOURCE: ABNORMAL
SOURCE: ABNORMAL
SP GR UR STRIP: 1.01 (ref 1–1.03)
SP GR UR STRIP: ABNORMAL (ref 1–1.03)
UROBILINOGEN UR STRIP-MCNC: ABNORMAL MG/DL (ref 0–2)
UROBILINOGEN UR STRIP-MCNC: NORMAL MG/DL (ref 0–2)
WBC # BLD AUTO: 8 10E9/L (ref 4–11)
WBC #/AREA URNS AUTO: <1 /HPF (ref 0–5)
WBC #/AREA URNS AUTO: ABNORMAL /HPF

## 2018-03-20 PROCEDURE — 12000001 ZZH R&B MED SURG/OB UMMC

## 2018-03-20 PROCEDURE — 36415 COLL VENOUS BLD VENIPUNCTURE: CPT

## 2018-03-20 PROCEDURE — 25000128 H RX IP 250 OP 636: Performed by: STUDENT IN AN ORGANIZED HEALTH CARE EDUCATION/TRAINING PROGRAM

## 2018-03-20 PROCEDURE — 36415 COLL VENOUS BLD VENIPUNCTURE: CPT | Performed by: INTERNAL MEDICINE

## 2018-03-20 PROCEDURE — 49083 ABD PARACENTESIS W/IMAGING: CPT | Mod: GC | Performed by: INTERNAL MEDICINE

## 2018-03-20 PROCEDURE — 83605 ASSAY OF LACTIC ACID: CPT | Performed by: INTERNAL MEDICINE

## 2018-03-20 PROCEDURE — 25000132 ZZH RX MED GY IP 250 OP 250 PS 637: Performed by: MARRIAGE & FAMILY THERAPIST

## 2018-03-20 PROCEDURE — 99233 SBSQ HOSP IP/OBS HIGH 50: CPT | Mod: 25 | Performed by: INTERNAL MEDICINE

## 2018-03-20 PROCEDURE — 25000132 ZZH RX MED GY IP 250 OP 250 PS 637: Performed by: STUDENT IN AN ORGANIZED HEALTH CARE EDUCATION/TRAINING PROGRAM

## 2018-03-20 PROCEDURE — 85027 COMPLETE CBC AUTOMATED: CPT

## 2018-03-20 PROCEDURE — T1013 SIGN LANG/ORAL INTERPRETER: HCPCS | Mod: U3

## 2018-03-20 PROCEDURE — 87899 AGENT NOS ASSAY W/OPTIC: CPT

## 2018-03-20 PROCEDURE — 25000132 ZZH RX MED GY IP 250 OP 250 PS 637

## 2018-03-20 PROCEDURE — 81001 URINALYSIS AUTO W/SCOPE: CPT

## 2018-03-20 PROCEDURE — 0W9G3ZZ DRAINAGE OF PERITONEAL CAVITY, PERCUTANEOUS APPROACH: ICD-10-PCS | Performed by: INTERNAL MEDICINE

## 2018-03-20 PROCEDURE — 36415 COLL VENOUS BLD VENIPUNCTURE: CPT | Performed by: STUDENT IN AN ORGANIZED HEALTH CARE EDUCATION/TRAINING PROGRAM

## 2018-03-20 PROCEDURE — 87040 BLOOD CULTURE FOR BACTERIA: CPT | Performed by: STUDENT IN AN ORGANIZED HEALTH CARE EDUCATION/TRAINING PROGRAM

## 2018-03-20 RX ORDER — PIPERACILLIN SODIUM, TAZOBACTAM SODIUM 3; .375 G/15ML; G/15ML
3.38 INJECTION, POWDER, LYOPHILIZED, FOR SOLUTION INTRAVENOUS EVERY 6 HOURS
Status: DISCONTINUED | OUTPATIENT
Start: 2018-03-20 | End: 2018-03-23

## 2018-03-20 RX ORDER — ACETAMINOPHEN 325 MG/1
325 TABLET ORAL EVERY 4 HOURS PRN
Status: DISCONTINUED | OUTPATIENT
Start: 2018-03-20 | End: 2018-03-24 | Stop reason: HOSPADM

## 2018-03-20 RX ADMIN — LORAZEPAM 0.5 MG: 0.5 TABLET ORAL at 02:08

## 2018-03-20 RX ADMIN — PIPERACILLIN AND TAZOBACTAM 3.38 G: 3; .375 INJECTION, POWDER, LYOPHILIZED, FOR SOLUTION INTRAVENOUS; PARENTERAL at 02:08

## 2018-03-20 RX ADMIN — MELATONIN 3 MG: 3 TAB ORAL at 20:21

## 2018-03-20 RX ADMIN — PIPERACILLIN SODIUM AND TAZOBACTAM SODIUM 3.38 G: 3; .375 INJECTION, POWDER, LYOPHILIZED, FOR SOLUTION INTRAVENOUS at 20:21

## 2018-03-20 RX ADMIN — ACETAMINOPHEN 325 MG: 325 TABLET, FILM COATED ORAL at 18:51

## 2018-03-20 RX ADMIN — LORAZEPAM 0.5 MG: 0.5 TABLET ORAL at 21:54

## 2018-03-20 RX ADMIN — ACETAMINOPHEN 325 MG: 325 TABLET, FILM COATED ORAL at 21:54

## 2018-03-20 RX ADMIN — AMOXICILLIN AND CLAVULANATE POTASSIUM 1 TABLET: 875; 125 TABLET, FILM COATED ORAL at 13:37

## 2018-03-20 RX ADMIN — PIPERACILLIN AND TAZOBACTAM 3.38 G: 3; .375 INJECTION, POWDER, LYOPHILIZED, FOR SOLUTION INTRAVENOUS; PARENTERAL at 08:27

## 2018-03-20 RX ADMIN — LORAZEPAM 0.5 MG: 0.5 TABLET ORAL at 08:27

## 2018-03-20 RX ADMIN — SODIUM CHLORIDE, POTASSIUM CHLORIDE, SODIUM LACTATE AND CALCIUM CHLORIDE 500 ML: 600; 310; 30; 20 INJECTION, SOLUTION INTRAVENOUS at 21:55

## 2018-03-20 NOTE — PLAN OF CARE
Problem: Patient Care Overview  Goal: Plan of Care/Patient Progress Review  Denies pain. IV ABX infused via PIV. Low grade temp. Slept through the night. Up independently. Urine sent. Last MSSA 5.

## 2018-03-20 NOTE — PLAN OF CARE
Problem: Patient Care Overview  Goal: Plan of Care/Patient Progress Review  Pt Ox4, febrile - tmax 102.5. Triggered sepsis, lactic acid 1.6. MD notified about adding something prn for fevers.  Bedside paracentesis performed. DC'd IV abx, transitioned to PO abx. Reg diet, good appetite. Calls/makes needs known. Will continue POC.

## 2018-03-20 NOTE — PROCEDURES
"Procedure/Surgery Information   [facility], [place]    Bedside Procedure Note  Date of Service (when I performed the procedure): 03/20/2018    Sylvain Pitts is a 38 year old male patient.  No diagnosis found.  Past Medical History:   Diagnosis Date     Anxiety      Kidney stones      Temp: 100.6  F (38.1  C) Temp src: Oral BP: 138/83 Pulse: 107   Resp: 20 SpO2: 92 % O2 Device: None (Room air)      Abdominal paracentesis  Date/Time: 3/20/2018 2:26 PM  Performed by: AMARA RAYMOND  Authorized by: AMARA RAYMOND   Consent: Verbal consent obtained. Written consent obtained.  Risks and benefits: risks, benefits and alternatives were discussed  Consent given by: patient  Patient understanding: patient states understanding of the procedure being performed  Patient consent: the patient's understanding of the procedure matches consent given  Procedure consent: procedure consent matches procedure scheduled  Relevant documents: relevant documents present and verified  Test results: test results available and properly labeled  Site marked: the operative site was marked  Imaging studies: imaging studies available  Patient identity confirmed: verbally with patient, arm band and hospital-assigned identification number  Time out: Immediately prior to procedure a \"time out\" was called to verify the correct patient, procedure, equipment, support staff and site/side marked as required.  Preparation: Patient was prepped and draped in the usual sterile fashion.  Local anesthesia used: yes  Anesthesia: local infiltration    Anesthesia:  Local anesthesia used: yes  Local Anesthetic: lidocaine 1% with epinephrine  Anesthetic total: 10 mL    Sedation:  Patient sedated: no  Patient tolerance: Patient tolerated the procedure well with no immediate complications      1 L of ascites fluid removed.     Amara Raymond       Attending note    I was present throughout the entire procedure;  I agree with the " documentation above    Leanne Arrington MD

## 2018-03-20 NOTE — PROGRESS NOTES
Social Work Services Progress Note    Hospital Day: 3  Date of Initial Social Work Evaluation:  3/20/2018 see recent SW assessment from last week  Collaborated with:  Patient, medical chart    Data:  Juan Antonio is a 38 year old male admitted to [facility] 3/18/18 from [place] detox for febrile condition. SW worked with Juan Antonio last week during his admission for alcohol withdrawal and facilitated the transfer to detox. Juan Antonio will now dc home once medically stable. SW met with Juan Antonio to discuss dc plan and CD treatment    Intervention:  Juan Antonio was agreeable to the meeting to discuss options for treatment. SW provided in depth discussion around Inpatient vs outpatient treatment, and how to obtain funding. SW gave Juan Antonio the number to call and arrange a Rule 25 assessment once DC'd and he expressed and demonstrated understanding by repeat back method.     Assessment: Juan Antonio appears to remain motivated to pursue treatment for his alcohol dependence. He was accepting and appreciative of treatment information and relays he intends to follow up on arranging the rule 25 assessment. Juan Antonio was lying in bed, alert and orient, but appeared lethargic and nauseas.     Plan:    Anticipated Disposition:  Home, no needs identified    Barriers to d/c plan:  Medical clearance    Follow Up:  sw provided rule 25 assessment information for patient to follow up on. Confirms he will do so.    Sandra RENAE, MSW  5B  (Medical/Surgical)  Phone: [phone]  Pager: [phone]

## 2018-03-20 NOTE — PLAN OF CARE
Problem: Patient Care Overview  Goal: Plan of Care/Patient Progress Review  Outcome: No Change  Pt is A&O x 4, intermittent hypertensive and on room air. Pt is Tunisian speaking. IV Zosyn infused. L PIV SL. PRN Ativan given for anxiety and Melatonin for sleep. Unable to collect UA sample, pt aware. Pt scored 5 on MSSA, no interventions needed. Pt tolerating regular diet well. Up independently in room, calls appropriately. Plan to d/c in 1-2 days. Continue to monitor and POC.

## 2018-03-20 NOTE — PROGRESS NOTES
[facility], [place]    Internal Medicine Progress Note - [facility] Service    Main Plans for Today   - Transition to oral antibiotics  - Paracentesis    Assessment & Plan   Sylvain Pitts is a 38 year old male admitted on 3/18/2018. He has a history of alcoholic cirrhosis, anxiety, and kidney stones and is admitted for HCAP.     # HCAP vs Aspiration PNA  Patient was febrile in detox, chest x-ray shows atelectasis vs. Pneumonia/aspiration. Procal 0.31. Lactate 2.1. Coarse breath sounds. MRSA swab negative. Sputum culture pending. Diagnostic para negative for SBP. U/A negative. Was on Zosyn 3/19-3/20.  - Change antibiotics to Augmentin     # Alcohol withdrawal  Patient is much improved from when he was first admitted on 3/14 and is at his baseline mental status.     # Alcoholic cirrhosis with ascites  Patient complaining of diffuse abdominal pain today, and abdomen is more firm. Diagnostic para was negative for SBP.  - Therapeutic paracentesis today  - Follow-up with GI within one month as an outpatient.    Diet: Combination Diet Regular Diet Adult  Fluids: N/A  DVT Prophylaxis: Low Risk/Ambulatory with no VTE prophylaxis indicated  Code Status: Full Code    Disposition Plan   Expected discharge: Tomorrow, recommended to prior living arrangement once antibiotic plan established.     Entered: Cheikh Raymond 03/20/2018, 6:46 AM   Information in the above section will display in the discharge planner report.      The patient's care was discussed with the Attending Physician, Dr. Monge.    Cheikh Raymond  [facility]: 2  Pager: [phone]  Please see sticky note for cross cover information    Interval History   No acute events overnight.    Patient complains of diffuse abdominal pain that kept him awake last night. Continue to endorse non-productive cough. Denies chest pain.    Physical Exam   Vital Signs: Temp: 99.4  F (37.4  C) Temp src: Oral BP: 127/78 Pulse: 96   Resp:  18 SpO2: 97 % O2 Device: None (Room air)    Weight: 226 lbs 0 oz  General Appearance: Uncomfortable  Eyes: Icteric sclera  Respiratory: Mildly coarse breath sounds bilaterally  Cardiovascular: Regular rate and rhythm, S1/S2, no m/r/g  GI: Abdomen distended, not tender, more firm than yesterday  Skin: Jaundiced  Other: AOx3         Data   Data     Recent Labs  Lab 03/18/18  1620 03/18/18  0946 03/18/18  0242 03/15/18  0720  03/14/18  1837   WBC 7.6  --  8.7 6.6  --  8.1   HGB 11.0*  --  11.3* 11.2*  --  12.0*   *  --  111* 112*  --  108*   PLT 76*  --  83* 66*  --  79*   INR  --  1.84* 1.77* 1.74*  < >  --      --  134 139  --  140   POTASSIUM 3.8  --  3.9 3.7  --  4.0   CHLORIDE 104  --  102 106  --  106   CO2 23  --  22 19*  --  23   BUN 10  --  12 4*  --  4*   CR 0.62*  --  0.66 0.53*  --  0.54*   ANIONGAP 8  --  10 14  --  11   JOHANN 8.0*  --  8.4* 8.0*  --  8.1*   *  --  159* 107*  --  117*   ALBUMIN  --   --  2.5* 2.5*  --  2.9*   PROTTOTAL  --   --  7.4 7.3  --  8.2   BILITOTAL  --   --  16.8* 14.4*  --  14.4*   ALKPHOS  --   --  152* 157*  --  180*   ALT  --   --  45 59  --  59   AST  --   --  106* 199*  --  211*   LIPASE  --   --   --   --   --  479*   < > = values in this interval not displayed.     Pt was seen and examined by me; confirmed abdominal distention with fluid wave and shifting dullness; reviewed labs, vitals, imaging.  I agree with the detailed A/P as documented above including paracentesis today.    Leanne Arrington MD

## 2018-03-21 ENCOUNTER — APPOINTMENT (OUTPATIENT)
Dept: INTERVENTIONAL RADIOLOGY/VASCULAR | Facility: CLINIC | Age: 38
DRG: 178 | End: 2018-03-21
Attending: NURSE PRACTITIONER
Payer: COMMERCIAL

## 2018-03-21 ENCOUNTER — APPOINTMENT (OUTPATIENT)
Dept: GENERAL RADIOLOGY | Facility: CLINIC | Age: 38
DRG: 178 | End: 2018-03-21
Attending: INTERNAL MEDICINE
Payer: COMMERCIAL

## 2018-03-21 ENCOUNTER — OFFICE VISIT (OUTPATIENT)
Dept: INTERPRETER SERVICES | Facility: CLINIC | Age: 38
End: 2018-03-21
Payer: COMMERCIAL

## 2018-03-21 ENCOUNTER — OFFICE VISIT (OUTPATIENT)
Dept: INTERPRETER SERVICES | Facility: CLINIC | Age: 38
End: 2018-03-21

## 2018-03-21 ENCOUNTER — APPOINTMENT (OUTPATIENT)
Dept: CT IMAGING | Facility: CLINIC | Age: 38
DRG: 178 | End: 2018-03-21
Attending: INTERNAL MEDICINE
Payer: COMMERCIAL

## 2018-03-21 LAB
ALBUMIN SERPL-MCNC: 2.4 G/DL (ref 3.4–5)
ALP SERPL-CCNC: 125 U/L (ref 40–150)
ALT SERPL W P-5'-P-CCNC: 52 U/L (ref 0–70)
ANION GAP SERPL CALCULATED.3IONS-SCNC: 11 MMOL/L (ref 3–14)
AST SERPL W P-5'-P-CCNC: 111 U/L (ref 0–45)
BILIRUB SERPL-MCNC: 20.2 MG/DL (ref 0.2–1.3)
BUN SERPL-MCNC: 10 MG/DL (ref 7–30)
CALCIUM SERPL-MCNC: 8 MG/DL (ref 8.5–10.1)
CHLORIDE SERPL-SCNC: 105 MMOL/L (ref 94–109)
CO2 SERPL-SCNC: 20 MMOL/L (ref 20–32)
CREAT SERPL-MCNC: 0.9 MG/DL (ref 0.66–1.25)
ERYTHROCYTE [DISTWIDTH] IN BLOOD BY AUTOMATED COUNT: 17.5 % (ref 10–15)
GFR SERPL CREATININE-BSD FRML MDRD: >90 ML/MIN/1.7M2
GLUCOSE SERPL-MCNC: 115 MG/DL (ref 70–99)
HCT VFR BLD AUTO: 34.8 % (ref 40–53)
HGB BLD-MCNC: 11.3 G/DL (ref 13.3–17.7)
HIV 1+2 AB+HIV1 P24 AG SERPL QL IA: NONREACTIVE
INR PPP: 1.89 (ref 0.86–1.14)
L PNEUMO1 AG UR QL IA: NORMAL
LDH FLD L TO P-CCNC: 105 U/L
LDH SERPL L TO P-CCNC: 309 U/L (ref 85–227)
MCH RBC QN AUTO: 36.5 PG (ref 26.5–33)
MCHC RBC AUTO-ENTMCNC: 32.5 G/DL (ref 31.5–36.5)
MCV RBC AUTO: 112 FL (ref 78–100)
PLATELET # BLD AUTO: 83 10E9/L (ref 150–450)
POTASSIUM SERPL-SCNC: 3.4 MMOL/L (ref 3.4–5.3)
PROCALCITONIN SERPL-MCNC: 0.44 NG/ML
PROT FLD-MCNC: 1.6 G/DL
PROT SERPL-MCNC: 7 G/DL (ref 6.8–8.8)
RBC # BLD AUTO: 3.1 10E12/L (ref 4.4–5.9)
SODIUM SERPL-SCNC: 136 MMOL/L (ref 133–144)
SPECIMEN SOURCE FLD: NORMAL
SPECIMEN SOURCE FLD: NORMAL
SPECIMEN SOURCE: NORMAL
WBC # BLD AUTO: 8 10E9/L (ref 4–11)

## 2018-03-21 PROCEDURE — 87102 FUNGUS ISOLATION CULTURE: CPT

## 2018-03-21 PROCEDURE — 83615 LACTATE (LD) (LDH) ENZYME: CPT

## 2018-03-21 PROCEDURE — 87116 MYCOBACTERIA CULTURE: CPT

## 2018-03-21 PROCEDURE — 99233 SBSQ HOSP IP/OBS HIGH 50: CPT | Mod: GC | Performed by: INTERNAL MEDICINE

## 2018-03-21 PROCEDURE — 27210903 ZZH KIT CR5

## 2018-03-21 PROCEDURE — 0W993ZX DRAINAGE OF RIGHT PLEURAL CAVITY, PERCUTANEOUS APPROACH, DIAGNOSTIC: ICD-10-PCS | Performed by: RADIOLOGY

## 2018-03-21 PROCEDURE — 80053 COMPREHEN METABOLIC PANEL: CPT

## 2018-03-21 PROCEDURE — T1013 SIGN LANG/ORAL INTERPRETER: HCPCS | Mod: U3

## 2018-03-21 PROCEDURE — 71250 CT THORAX DX C-: CPT

## 2018-03-21 PROCEDURE — 84145 PROCALCITONIN (PCT): CPT

## 2018-03-21 PROCEDURE — 87389 HIV-1 AG W/HIV-1&-2 AB AG IA: CPT

## 2018-03-21 PROCEDURE — 27210732 ZZH ACCESSORY CR1

## 2018-03-21 PROCEDURE — 87070 CULTURE OTHR SPECIMN AEROBIC: CPT

## 2018-03-21 PROCEDURE — 85027 COMPLETE CBC AUTOMATED: CPT

## 2018-03-21 PROCEDURE — 32555 ASPIRATE PLEURA W/ IMAGING: CPT

## 2018-03-21 PROCEDURE — 84157 ASSAY OF PROTEIN OTHER: CPT

## 2018-03-21 PROCEDURE — 27211039 ZZH NEEDLE CR2

## 2018-03-21 PROCEDURE — 36415 COLL VENOUS BLD VENIPUNCTURE: CPT

## 2018-03-21 PROCEDURE — 25000132 ZZH RX MED GY IP 250 OP 250 PS 637: Performed by: MARRIAGE & FAMILY THERAPIST

## 2018-03-21 PROCEDURE — 85610 PROTHROMBIN TIME: CPT

## 2018-03-21 PROCEDURE — 27210995 ZZH RX 272: Performed by: RADIOLOGY

## 2018-03-21 PROCEDURE — 87206 SMEAR FLUORESCENT/ACID STAI: CPT

## 2018-03-21 PROCEDURE — 12000001 ZZH R&B MED SURG/OB UMMC

## 2018-03-21 PROCEDURE — 40000141 ZZH STATISTIC PERIPHERAL IV START W/O US GUIDANCE

## 2018-03-21 PROCEDURE — 71046 X-RAY EXAM CHEST 2 VIEWS: CPT

## 2018-03-21 PROCEDURE — 25000128 H RX IP 250 OP 636: Performed by: STUDENT IN AN ORGANIZED HEALTH CARE EDUCATION/TRAINING PROGRAM

## 2018-03-21 RX ORDER — LIDOCAINE HYDROCHLORIDE 10 MG/ML
1-30 INJECTION, SOLUTION EPIDURAL; INFILTRATION; INTRACAUDAL; PERINEURAL
Status: COMPLETED | OUTPATIENT
Start: 2018-03-21 | End: 2018-03-21

## 2018-03-21 RX ADMIN — PIPERACILLIN SODIUM AND TAZOBACTAM SODIUM 3.38 G: 3; .375 INJECTION, POWDER, LYOPHILIZED, FOR SOLUTION INTRAVENOUS at 18:52

## 2018-03-21 RX ADMIN — MELATONIN 3 MG: 3 TAB ORAL at 22:44

## 2018-03-21 RX ADMIN — PIPERACILLIN SODIUM AND TAZOBACTAM SODIUM 3.38 G: 3; .375 INJECTION, POWDER, LYOPHILIZED, FOR SOLUTION INTRAVENOUS at 13:22

## 2018-03-21 RX ADMIN — LIDOCAINE HYDROCHLORIDE 6 ML: 10 INJECTION, SOLUTION EPIDURAL; INFILTRATION; INTRACAUDAL; PERINEURAL at 14:47

## 2018-03-21 RX ADMIN — PIPERACILLIN SODIUM AND TAZOBACTAM SODIUM 3.38 G: 3; .375 INJECTION, POWDER, LYOPHILIZED, FOR SOLUTION INTRAVENOUS at 07:58

## 2018-03-21 RX ADMIN — PIPERACILLIN SODIUM AND TAZOBACTAM SODIUM 3.38 G: 3; .375 INJECTION, POWDER, LYOPHILIZED, FOR SOLUTION INTRAVENOUS at 01:22

## 2018-03-21 NOTE — PROGRESS NOTES
Patient Name: Sylvain Pitts  Medical Record Number: [id number]  Today's Date: 3/21/2018    Procedure: right thoracentesis  Proceduralist: Dr Radha Nguyen     Procedure start time: 1420  Puncture time: 1430  Procedure end time: 1445    Report given to: Yolie ZAMORA  : Afghan    Other Notes: Pt arrived to IR room 1 from . Pt denies any questions or concerns regarding procedure. Pt positioned sitting and monitored per protocol. 220 cc clear yellow pleural fluid obtained with 3 specimens sent to lab. Pt tolerated procedure without any noted complications. Pt transferred back to .

## 2018-03-21 NOTE — PLAN OF CARE
Problem: Patient Care Overview  Goal: Plan of Care/Patient Progress Review  Pt AOx4, VSS on RA. Afebrile today w/o tylenol. No c/o pain or nausea. Reg diet, fair appetite. Sputum and UA need to be collected - pt informed. Receiving IV zosyn q6 hrs. CXR revealing lower right lobe pneumonia. Went to IR thoracentesis around 1400. Calls/makes needs known. Will continue POC

## 2018-03-21 NOTE — CONSULTS
INFECTIOUS DISEASE SERVICE HISTORY & PHYSICAL   Sylvain Pitts ([id number]) admitted on 3/18/2018  Primary care provider: [facility], [facility] [address]    Reason for consult:  Non-resolving pneumonia    --------------------------------------------------------------  ASSESSMENT & PLAN :    Sylvain Pitts is a 38 year old male with history of alcoholic cirrhosis, anxiety and alochol withdrawal admitted for management of HCAP which was initially not improving on antibiotics. At interview today, Patient says he feels subjectively improved compared to prior days with improvement in subjective fever, chills, pleuritic chest pain and frequency of productive cough.     1. Pneumonia - Dense consolidations within the right middle and lower lobes, a small associated right-sided pleural effusion.  - s/p US guided - Right-sided diagnostic and therapeutic thoracentesis 3/21/18 . 120 cc yellow-brown fluid sent to laboratory  2. Liver cirrhosis   3. Alcohol abuse    Recommendations:  - continue Zosyn for now. .  - Recommended getting Sputum culture, S. Pneumo urine and Legionella urine  - follow- up on pleural fluid cultures    ID will continue to follow.    Patient staffed with Dr. Andino who agrees with the plan    Bryanna Simons  Infectious disease service  PGY-2 Internal Medicine  Pager: [phone]    Attestation:  This patient has been seen and evaluated by me.  I discussed this patient with the resident Dr Simons and agree with the findings and plan in this note. I also personally edited this note to reflect my findings. I have reviewed today's vital signs, medications, labs and imaging.    Brayden Andino MD,M.Med.Sc.  Attending, Infectious Diseases  Pager: [phone] Brayden Andino MD,M.Med.Sc.  Attending, Infectious Diseases  Pager: [phone]            ----------------------------------------------------  History of Present Illness:   Sylvain  Gisselle is a 38 year old male with history of alcoholic cirrhosis, anxiety and alochol withdrawal admitted for management of HCAP.     Was in his usual state of health until 3/14 when he was admitted to [facility] for workup of alcoholic cirrhosis for which GI saw him and planned to continue management on an outpatient basis. He also had alcohol withdrawal for which he was detoxing at another facility when he spiked a fever overnight from 3/17 to 3/18. A CXR done was concerning for an ongoing infectious process and so he was transferred back to [facility] for management of a suspected HCAP.    He was placed on Zosyn initially and de-escalated to Augmentin for which he received on dose, but then was transitioned back to Zosyn because he spiked a fever. His fever curve was also noted to be uptrending on the Zosyn with a Tmax of 102.5 yesterday    Today, the patient says he feels much improved compared to yesterday. He endorses improvement in his subjective fever and chills, says the frequency and intensity of his coughing spells are much improved although still productive of whitish sputum and says his pleuritic chest pain is still present but better. He is able to ambulate in the hallway without problems. He is tolerating current antibiotic.   Has no other concerns or complaints.     ROS:  10 point ROS neg other than the symptoms noted above in the HPI.    Past Medical History:   Patient Active Problem List   Diagnosis     Alcohol abuse     Cirrhosis of liver with ascites (H)     Alcohol withdrawal (H)     SIRS (systemic inflammatory response syndrome) (H)     Past Surgical History:   Past Surgical History:   Procedure Laterality Date     HEAD & NECK SURGERY      head surgery when he was 14 years old.     Family history : reviewed, non contributory   Social history : smokes tobacco, history of alcohol abuse , lives [place]    Medications (outpatient):    No current facility-administered medications on file prior to encounter.    Current Outpatient Prescriptions on File Prior to Encounter:  multivitamin, therapeutic with minerals (THERA-VIT-M) TABS tablet Take 1 tablet by mouth daily   cloNIDine (CATAPRES) 0.1 MG tablet Take 1 tablet (0.1 mg) by mouth 2 times daily for 5 days   gabapentin (NEURONTIN) 800 MG tablet Take 1 tablet (800 mg) by mouth 3 times daily for 5 days     Hematology Studies  Recent Labs   Lab Test  03/21/18   1123  03/20/18   0759  03/18/18   1620  03/18/18   0242  03/15/18   0720  03/14/18   1837   WBC  8.0  8.0  7.6  8.7  6.6  8.1   ANEU   --    --    --    --    --   6.0   AEOS   --    --    --    --    --   0.2   HGB  11.3*  11.2*  11.0*  11.3*  11.2*  12.0*   MCV  112*  114*  112*  111*  112*  108*   PLT  83*  84*  76*  83*  66*  79*     Metabolic Studies   Recent Labs   Lab Test  03/21/18   1123  03/18/18   1620  03/18/18   0242  03/15/18   0720  03/14/18   1837   NA  136  135  134  139  140   POTASSIUM  3.4  3.8  3.9  3.7  4.0   CHLORIDE  105  104  102  106  106   CO2  20  23  22  19*  23   BUN  10  10  12  4*  4*   CR  0.90  0.62*  0.66  0.53*  0.54*   GFRESTIMATED  >90  >90  >90  >90  >90     Hepatic Studies  Recent Labs   Lab Test  03/21/18   1123  03/18/18   0242  03/15/18   0720  03/14/18   1837   BILITOTAL  20.2*  16.8*  14.4*  14.4*   ALKPHOS  125  152*  157*  180*   ALBUMIN  2.4*  2.5*  2.5*  2.9*   AST  111*  106*  199*  211*   ALT  52  45  59  59     Microbiology:  Culture Micro   Date Value Ref Range Status   03/21/2018 PENDING  Preliminary   03/21/2018 PENDING  Preliminary   03/20/2018 No growth after 16 hours  Preliminary   03/20/2018 No growth after 16 hours  Preliminary   03/19/2018 (A)  Final    Canceled, Test credited  >10 Squamous epithelial cells/low power field indicates oral contamination. Please   recollect.     03/18/2018 No growth after 3 days  Preliminary   03/18/2018 No growth after 3 days  Preliminary   03/18/2018 No growth after 3 days  Preliminary   03/18/2018 No growth after 3 days   Preliminary     Urine Studies    Recent Labs   Lab Test  03/20/18   0231   LEUKEST  Duplicate request*  Negative   WBCU  Duplicate request*  <1     Hepatitis B Testing Recent Labs   Lab Test  03/15/18   1442   HEPBANG  Nonreactive     Hepatitis C Testing   Hepatitis C Antibody   Date Value Ref Range Status   03/15/2018 Nonreactive NR^Nonreactive Final     Comment:     Assay performance characteristics have not been established for newborns,   infants, and children       Objective:  Physical exam:  /78 (BP Location: Right arm)  Pulse 96  Temp 97.8  F (36.6  C) (Oral)  Resp 18  Wt 102.5 kg (226 lb)  SpO2 97%  Wt Readings from Last 2 Encounters:   03/18/18 102.5 kg (226 lb)   03/15/18 101.3 kg (223 lb 4.8 oz)     Intake/Output Summary (Last 24 hours) at 03/21/18 1701  Last data filed at 03/21/18 1325   Gross per 24 hour   Intake              240 ml   Output                0 ml   Net              240 ml     General:  lying comfortably in bed, in no apparent distress  HEENT:  PERRLA, EOMI. Sclera is non-icteric and conjunctiva is pink with no erythema. Oral mucosa moist, no oral lesions, good dentition.  Neck:  No JVD.  No cervical/supraclavicular LAD.    CV: RRR. S1, S2 with no murmurs, rubs, gallops. Peripheral radial pulse intact.  Resp: No increased work of breathing or use of accessory muscles, breathing comfortably on room air.  Lungs clear to auscultation bilaterally although less audible.  No wheezes or crackles.    Abdomen:  No obvious scars or hernias. Normal active bowel sounds.  Abdomen is soft, non-tender to palpation, rebound, percussion. Distended.   :  No suprapubic tenderness.  MSK:  Upper and lower extremity muscle strength intact 5/5 bilaterally.  No large joint swelling or erythema.    Extremities: Capillary refill less than 3 sec. 2+/4 radial pulses bilaterally.  2+/4 pedal pulses bilaterally. No pre-tibial edema. No cyanosis or clubbing.  Lymphatic:  No cervical, supraclavicular  LAD.  Skin:  Warm and dry. No erythema, rashes, ulceration or diaphoresis.  Neuro: CN II-XII grossly intact. Alert and oriented x3.  Moving all extremities equally.     Labs (Past three days):  Results for orders placed or performed during the hospital encounter of 03/18/18 (from the past 24 hour(s))   Blood culture   Result Value Ref Range    Specimen Description Blood Unspecified Site     Culture Micro No growth after 16 hours    Blood culture   Result Value Ref Range    Specimen Description Blood Unspecified Site     Culture Micro No growth after 16 hours    XR Chest 2 Views    Narrative    PA and lateral chest    HISTORY: Possible pneumonia    COMPARISON STUDY: 3/18/2018    FINDINGS: Cardiac silhouette is borderline enlarged. Right lower lobe  consolidation and atelectasis. Linear atelectasis left base. Lung  volumes are low.      Impression    IMPRESSION: Right lower lobe pneumonia.    SADIQ RADFORD MD   CT Chest w/o Contrast    Narrative    EXAMINATION: Chest CT  3/21/2018     CLINICAL HISTORY: Unresolving pneumonia.    COMPARISON: Chest radiograph 3/21/2018.    TECHNIQUE: CT imaging obtained through the chest without intravenous  contrast. Coronal, sagittal and axial MIP reformatted images obtained.    FINDINGS:  Dense areas of consolidation throughout the right middle lobe and  right lower lobe corresponding with radiographic findings on same  date. Small associated right pleural effusion. Mild left lower lobe  and lingular atelectasis. No suspicious pulmonary nodules identified.  No pneumothorax. Central tracheobronchial tree is clear.    Heart size is at upper limits of normal. No pericardial effusion.  Normal branching pattern of the aorta. Visualized portions of the  thyroid gland are unremarkable. Small sliding hiatal hernia.  Noncontrast evaluation of the central pulmonary vasculature is normal.  Few scattered mildly prominent mediastinal lymph nodes. No significant  axillary lymphadenopathy. Dilated  vessels within the anterior chest  and abdominal walls.    Hepatomegaly with nodular contour. Mild to moderate associated  ascites. Spleen is enlarged. Numerous collateral vessels throughout  the visualized superior abdomen.    Benign-appearing sclerotic focus within the left humeral head, likely  a bone island. No suspicious bony lesions.      Impression    IMPRESSION:   1. Dense consolidations within the right middle and lower lobes,  consistent with pneumonia. There is a small associated right-sided  pleural effusion.  2. Cirrhotic appearing liver with sequelae of portal hypertension,  including splenomegaly, ascites, and numerous collateral vessels  throughout the upper abdomen and anterior chest/abdominal wall.    I have personally reviewed the examination and initial interpretation  and I agree with the findings.    SADIQ RADFORD MD   CBC with platelets   Result Value Ref Range    WBC 8.0 4.0 - 11.0 10e9/L    RBC Count 3.10 (L) 4.4 - 5.9 10e12/L    Hemoglobin 11.3 (L) 13.3 - 17.7 g/dL    Hematocrit 34.8 (L) 40.0 - 53.0 %     (H) 78 - 100 fl    MCH 36.5 (H) 26.5 - 33.0 pg    MCHC 32.5 31.5 - 36.5 g/dL    RDW 17.5 (H) 10.0 - 15.0 %    Platelet Count 83 (L) 150 - 450 10e9/L   Comprehensive metabolic panel   Result Value Ref Range    Sodium 136 133 - 144 mmol/L    Potassium 3.4 3.4 - 5.3 mmol/L    Chloride 105 94 - 109 mmol/L    Carbon Dioxide 20 20 - 32 mmol/L    Anion Gap 11 3 - 14 mmol/L    Glucose 115 (H) 70 - 99 mg/dL    Urea Nitrogen 10 7 - 30 mg/dL    Creatinine 0.90 0.66 - 1.25 mg/dL    GFR Estimate >90 >60 mL/min/1.7m2    GFR Estimate If Black >90 >60 mL/min/1.7m2    Calcium 8.0 (L) 8.5 - 10.1 mg/dL    Bilirubin Total 20.2 (HH) 0.2 - 1.3 mg/dL    Albumin 2.4 (L) 3.4 - 5.0 g/dL    Protein Total 7.0 6.8 - 8.8 g/dL    Alkaline Phosphatase 125 40 - 150 U/L    ALT 52 0 - 70 U/L     (H) 0 - 45 U/L   INR   Result Value Ref Range    INR 1.89 (H) 0.86 - 1.14   Procalcitonin   Result Value Ref Range     Procalcitonin 0.44 ng/ml   HIV Antigen Antibody Combo   Result Value Ref Range    HIV Antigen Antibody Combo Nonreactive NR^Nonreactive       Lactate Dehydrogenase   Result Value Ref Range    Lactate Dehydrogenase 309 (H) 85 - 227 U/L   Interventional Radiology Adult/Peds IP Consult: Patient to be seen: Routine within 24 hours; Call back #: Pg [phone], Ph [phone]; Thoracentesis    Narrative    Verona Malhotra APRN CNP     3/21/2018  1:11 PM  Patient is on IR schedule 3/21/2018 for a dx thoracentesis.   Labs WNL for procedure.    No NPO required.  Consent will be done prior to procedure.     Please contact the IR charge RN at [phone] for estimated time of   procedure.     Case discussed with Dr. Briones from IR and Dr. Raymond. Patient   requires German  so procedure timing will be based on   their availability.     Verona Malhotra DNP, PAUL  Interventional Radiology  Phone: [phone]  Pager: [phone]     Fluid Culture Aerobic Bacterial   Result Value Ref Range    Specimen Description Pleural fluid Right     Special Requests ALSO RECVD ANER TUBE     Culture Micro PENDING    Fungus Culture, non-blood   Result Value Ref Range    Specimen Description Pleural fluid Right     Culture Micro PENDING    IR Thoracentesis    Narrative    Procedure date: 3/21/2018.    History: Patient with history of alcoholic cirrhosis and right pleural  effusion, here for ultrasound guided thoracentesis.    Procedure: Right-sided diagnostic and therapeutic thoracentesis.    Preop diagnosis: Right pleural effusion.    Postop diagnosis: Right  pleural effusion, resolved.    Staff: Piyush Bella MD    Fellow: Elver Gerardo M.D.  Resident: Sylvain Garcia MD    Medications: 6 ml 1% lidocaine used for local anesthesia    Nursing: Throughout the procedure the patient's vital signs and oxygen  saturations were continuously monitored by nursing staff under my  supervision, and remained stable.    Fluoroscopy time: None.    IV  contrast: None.    Consent: Informed written and verbal consent was obtained.    Procedure/Findings:  The patient was placed in the upright position on  the gurney, and limited ultrasound evaluation of the right posterior  hemithorax revealed a small right pleural effusion. The area overlying   the right posterior hemithorax was prepped and draped in usual  sterile fashion. Under ultrasound guidance, the area overlying the  effusion was anesthetized to the pleura with 1% lidocaine. Under  ultrasound guidance a 5 Persian, 10 cm straight Yueh needle/catheter  was advanced into the fluid collection, with initial return of clear  yellow/brown fluid. The catheter was advanced off the needle into the  pleural space and the needle was removed. 120 cc of fluid was  collected and sent to laboratory for analysis. Extension tubing was  then connected to the catheter and vacuum drainage. An additional 100  cc' s of fluid was aspirated.  Repeat ultrasound revealed trace fluid  remaining.  The catheter was removed with the patient performing a  Valsalva maneuver.  The site was cleansed and dressed.  Images were  saved throughout the procedure. The procedure was well tolerated, with  no immediate complications.    Estimate blood loss: Less than 1 cc.    Specimens:  120 cc yellow-brown fluid sent to laboratory..      Impression    Impression: Ultrasound guided right thoracentesis. Total of 220 cc of  yellow-brown pleural fluid drained.    Plan: Patient transferred to unit 5A in stable condition.   Interventional radiology post procedure standing orders for  thoracentesis.  Daily dressing changes.       Urinalysis  Recent Labs   Lab Test  03/20/18   0231   COLOR  Duplicate request  Dark Yellow   APPEARANCE  Duplicate request  Clear   URINEGLC  Duplicate request*  Negative   URINEBILI  Duplicate request*  Moderate*   URINEKETONE  Duplicate request*  Negative   SG  Duplicate request  1.008   UBLD  Duplicate request*  Negative    URINEPH  Duplicate request  5.5   PROTEIN  Duplicate request*  Negative   NITRITE  Duplicate request*  Negative   LEUKEST  Duplicate request*  Negative   RBCU  Duplicate request  0   WBCU  Duplicate request*  <1

## 2018-03-21 NOTE — PLAN OF CARE
Problem: Patient Care Overview  Goal: Plan of Care/Patient Progress Review  Outcome: No Change  1695-3296: A&Ox4, VSS on RA, afebrile. Up independently in room. Denies pain, nausea, chest pain, or difficulty breathing. Left PIV SL'd. Receiving IV abx Zosyn q6hrs. See CXR results. Thoracentesis completed today. Jaundice sclera & skin. Tolerating regular diet & PO fluids. Voiding without difficulty, still need UA collected, pt aware, urinal at bedside. Sputum needed, pt aware. No BM on this shift. Plan to switch IV abx to PO & then D/C within next few days. Continue to monitor & follow POC.

## 2018-03-21 NOTE — PLAN OF CARE
Problem: Patient Care Overview  Goal: Plan of Care/Patient Progress Review  Patient admitted with HCAP from [facility]. Estonian speaking but proficient in English. Temp elevated during shift but trending down on RA. A&Ox4. Independent. Regular diet. BM x2. Voiding. L PIV SL. Switched back to IV Zosyn d/t fever. Gave 500 mL bolus of LR. Still need sputum and urine sample. Gave Tylenol for elevated temp. Pt requested melatonin for sleep and Ativan for anxiety. Last lactic acid: 1.6. Continue to monitor and follow the POC.

## 2018-03-21 NOTE — PROCEDURES
Interventional Radiology Brief Post Procedure Note    Procedure: IR THORACENTESIS    Proceduralist: Piyush Bella MD    Assistant: IR Fellow Physician, Elver Gerardo MD and Radiology Resident Physician, Isabelle Garcia MD    Time Out: Prior to the start of the procedure and with procedural staff participation, I verbally confirmed the patient s identity using two indicators, relevant allergies, that the procedure was appropriate and matched the consent or emergent situation, and that the correct equipment/implants were available. Immediately prior to starting the procedure I conducted the Time Out with the procedural staff and re-confirmed the patient s name, procedure, and site/side. (The Joint Commission universal protocol was followed.)  Yes    Medications   Medication Event Details Admin User Admin Time   lidocaine (PF) (XYLOCAINE) 1 % injection 1-30 mL Medication Given Dose: 6 mL; Route: Subcutaneous Huong Wilkerson, RN 3/21/2018  2:47 PM       Sedation: none    Findings: Right thoracentesis. 220cc clear yellow/brown fluid, 120cc of which send to the laboratory.     Estimated Blood Loss: Minimal    Fluoroscopy Time: 0 minute(s)     SPECIMENS: Fluid and/or tissue for Gram stain and culture    Complications: 1. None     Condition: Stable    Plan: Bedrest by 1 hour.     Comments: See dictated procedure note for full details.    Sylvain Garcia MD

## 2018-03-21 NOTE — PROGRESS NOTES
[facility], [place]    Internal Medicine Progress Note - [facility] Service    Main Plans for Today   - Workup of non-resolving pneumonia  - ID consult  - IR consult for diagnostic thoracentesis    Assessment & Plan   Sylvain Pitts is a 38 year old male admitted on 3/18/2018. He has a history of alcoholic cirrhosis, anxiety, and kidney stones and is admitted for HCAP.     # HCAP vs Aspiration PNA  Patient was febrile in detox, chest x-ray shows atelectasis vs. Pneumonia/aspiration. Procal 0.31. Lactate 2.1. Coarse breath sounds. MRSA swab negative. Sputum culture pending. Diagnostic para negative for SBP. U/A negative. Was on Zosyn 3/19-3/20. Changed to augmentin on 3/20; however, patient spiked fever of 102.5 later that day. Switched back to zosyn.  - Continue zosyn  - Chest imaging  - F/u U/A, sputum cultures  - CBC, procal, HIV  - ID consult  - Diagnostic thoracentesis by IR  - F/u pleural aerobic, AFB, and fungal cultures, LDH     # Alcohol withdrawal  Patient is much improved from when he was first admitted on 3/14 and is at his baseline mental status.     # Alcoholic cirrhosis with ascites  Patient complaining of diffuse abdominal pain today, and abdomen is more firm. Diagnostic para was negative for SBP. Therapeutic paracentesis on 3/20 (1 L off).  - Repeat MELD score  - Follow-up with GI within one month as an outpatient.    Diet: Combination Diet Regular Diet Adult  Fluids: N/A  DVT Prophylaxis: Low Risk/Ambulatory with no VTE prophylaxis indicated  Code Status: Full Code    Disposition Plan   Expected discharge: 2 - 3 days, recommended to prior living arrangement once antibiotic plan established.     Entered: Cheikh Raymond 03/21/2018, 7:02 AM   Information in the above section will display in the discharge planner report.      The patient's care was discussed with the Attending Physician, Dr. Monge.    Cheikh Raymond  [facility]: 2  Pager:  [phone]  Please see sticky note for cross cover information    Interval History   Triggered SIRS protocol overnight (Tmax 102.5, lactic acid 1.6). Switched antibiotics back to Zosyn, added acetaminophen PRN fever.    Patient says he feels okay this morning. Denies chest pain, shortness of breath, abdominal pain.    Physical Exam   Vital Signs: Temp: 99.2  F (37.3  C) Temp src: Oral BP: 123/75 Pulse: 93   Resp: 18 SpO2: 94 % O2 Device: None (Room air)    Weight: 226 lbs 0 oz  General Appearance: Resting comfortably in bed, no acute distress.  Eyes: Icteric sclera  Respiratory: Mildly coarse breath sounds bilaterally  Cardiovascular: Regular rate and rhythm, S1/S2, no m/r/g  GI: Abdomen distended, not tender, soft  Skin: Jaundiced  Other: AOx3         Data   Data     Recent Labs  Lab 03/20/18  0759 03/18/18  1620 03/18/18  0946 03/18/18  0242 03/15/18  0720  03/14/18  1837   WBC 8.0 7.6  --  8.7 6.6  --  8.1   HGB 11.2* 11.0*  --  11.3* 11.2*  --  12.0*   * 112*  --  111* 112*  --  108*   PLT 84* 76*  --  83* 66*  --  79*   INR  --   --  1.84* 1.77* 1.74*  < >  --    NA  --  135  --  134 139  --  140   POTASSIUM  --  3.8  --  3.9 3.7  --  4.0   CHLORIDE  --  104  --  102 106  --  106   CO2  --  23  --  22 19*  --  23   BUN  --  10  --  12 4*  --  4*   CR  --  0.62*  --  0.66 0.53*  --  0.54*   ANIONGAP  --  8  --  10 14  --  11   JOHANN  --  8.0*  --  8.4* 8.0*  --  8.1*   GLC  --  107*  --  159* 107*  --  117*   ALBUMIN  --   --   --  2.5* 2.5*  --  2.9*   PROTTOTAL  --   --   --  7.4 7.3  --  8.2   BILITOTAL  --   --   --  16.8* 14.4*  --  14.4*   ALKPHOS  --   --   --  152* 157*  --  180*   ALT  --   --   --  45 59  --  59   AST  --   --   --  106* 199*  --  211*   LIPASE  --   --   --   --   --   --  479*   < > = values in this interval not displayed.     Pt was seen and examined by me; confirmed lung exam findings of decreased breath sounds right base; I reviewed labs, vitals, imaging. I agree with the detailed  A/P as documented above; CT chest today revealed pleural effusion, needs to be tapped given spiking fevers despite antibiotics.   Further workup based on these results.    Leanne Arrington MD

## 2018-03-22 ENCOUNTER — APPOINTMENT (OUTPATIENT)
Dept: GENERAL RADIOLOGY | Facility: CLINIC | Age: 38
DRG: 178 | End: 2018-03-22
Attending: INTERNAL MEDICINE
Payer: COMMERCIAL

## 2018-03-22 LAB
ALBUMIN UR-MCNC: NEGATIVE MG/DL
APPEARANCE UR: CLEAR
BACTERIA SPEC CULT: ABNORMAL
BACTERIA SPEC CULT: NORMAL
BILIRUB UR QL STRIP: ABNORMAL
COLOR UR AUTO: ABNORMAL
CREAT SERPL-MCNC: 0.65 MG/DL (ref 0.66–1.25)
ERYTHROCYTE [DISTWIDTH] IN BLOOD BY AUTOMATED COUNT: 17.6 % (ref 10–15)
GFR SERPL CREATININE-BSD FRML MDRD: >90 ML/MIN/1.7M2
GLUCOSE UR STRIP-MCNC: NEGATIVE MG/DL
GRAM STN SPEC: ABNORMAL
HCT VFR BLD AUTO: 34.2 % (ref 40–53)
HGB BLD-MCNC: 11.1 G/DL (ref 13.3–17.7)
HGB UR QL STRIP: NEGATIVE
KETONES UR STRIP-MCNC: NEGATIVE MG/DL
LEUKOCYTE ESTERASE UR QL STRIP: NEGATIVE
Lab: ABNORMAL
MCH RBC QN AUTO: 36.5 PG (ref 26.5–33)
MCHC RBC AUTO-ENTMCNC: 32.5 G/DL (ref 31.5–36.5)
MCV RBC AUTO: 113 FL (ref 78–100)
NITRATE UR QL: NEGATIVE
PH UR STRIP: 5.5 PH (ref 5–7)
PLATELET # BLD AUTO: 95 10E9/L (ref 150–450)
RBC # BLD AUTO: 3.04 10E12/L (ref 4.4–5.9)
RBC #/AREA URNS AUTO: 0 /HPF (ref 0–2)
S PNEUM AG SPEC QL: NORMAL
S PNEUM AG SPEC QL: NORMAL
SOURCE: ABNORMAL
SP GR UR STRIP: 1.01 (ref 1–1.03)
SPECIMEN SOURCE: ABNORMAL
SPECIMEN SOURCE: ABNORMAL
SPECIMEN SOURCE: NORMAL
SPECIMEN SOURCE: NORMAL
UROBILINOGEN UR STRIP-MCNC: NORMAL MG/DL (ref 0–2)
WBC # BLD AUTO: 7.8 10E9/L (ref 4–11)
WBC #/AREA URNS AUTO: <1 /HPF (ref 0–5)

## 2018-03-22 PROCEDURE — 36415 COLL VENOUS BLD VENIPUNCTURE: CPT | Performed by: INTERNAL MEDICINE

## 2018-03-22 PROCEDURE — 71046 X-RAY EXAM CHEST 2 VIEWS: CPT

## 2018-03-22 PROCEDURE — 85027 COMPLETE CBC AUTOMATED: CPT | Performed by: INTERNAL MEDICINE

## 2018-03-22 PROCEDURE — 87899 AGENT NOS ASSAY W/OPTIC: CPT

## 2018-03-22 PROCEDURE — 25000128 H RX IP 250 OP 636: Performed by: STUDENT IN AN ORGANIZED HEALTH CARE EDUCATION/TRAINING PROGRAM

## 2018-03-22 PROCEDURE — 82565 ASSAY OF CREATININE: CPT | Performed by: INTERNAL MEDICINE

## 2018-03-22 PROCEDURE — 25000132 ZZH RX MED GY IP 250 OP 250 PS 637: Performed by: MARRIAGE & FAMILY THERAPIST

## 2018-03-22 PROCEDURE — 87205 SMEAR GRAM STAIN: CPT | Performed by: STUDENT IN AN ORGANIZED HEALTH CARE EDUCATION/TRAINING PROGRAM

## 2018-03-22 PROCEDURE — 12000001 ZZH R&B MED SURG/OB UMMC

## 2018-03-22 PROCEDURE — 99233 SBSQ HOSP IP/OBS HIGH 50: CPT | Mod: GC | Performed by: INTERNAL MEDICINE

## 2018-03-22 PROCEDURE — 81001 URINALYSIS AUTO W/SCOPE: CPT | Performed by: STUDENT IN AN ORGANIZED HEALTH CARE EDUCATION/TRAINING PROGRAM

## 2018-03-22 RX ADMIN — PIPERACILLIN SODIUM AND TAZOBACTAM SODIUM 3.38 G: 3; .375 INJECTION, POWDER, LYOPHILIZED, FOR SOLUTION INTRAVENOUS at 19:53

## 2018-03-22 RX ADMIN — PIPERACILLIN SODIUM AND TAZOBACTAM SODIUM 3.38 G: 3; .375 INJECTION, POWDER, LYOPHILIZED, FOR SOLUTION INTRAVENOUS at 08:11

## 2018-03-22 RX ADMIN — PIPERACILLIN SODIUM AND TAZOBACTAM SODIUM 3.38 G: 3; .375 INJECTION, POWDER, LYOPHILIZED, FOR SOLUTION INTRAVENOUS at 02:12

## 2018-03-22 RX ADMIN — PIPERACILLIN SODIUM AND TAZOBACTAM SODIUM 3.38 G: 3; .375 INJECTION, POWDER, LYOPHILIZED, FOR SOLUTION INTRAVENOUS at 14:35

## 2018-03-22 RX ADMIN — MELATONIN 3 MG: 3 TAB ORAL at 21:40

## 2018-03-22 NOTE — PROGRESS NOTES
INFECTIOUS DISEASE PROGRESS NOTE    Sylvain Pitts ([id number]) admitted on 3/18/2018  03/22/2018      Reason for consult:  Non-resolving pneumonia     --------------------------------------------------------------  ASSESSMENT & PLAN :     Sylvain Pitts is a 38 year old male with history of alcoholic cirrhosis, anxiety and alochol withdrawal admitted for management of HCAP which was initially not improving on antibiotics. At interview today, Patient says he feels subjectively improved compared to prior days with improvement in subjective fever, chills, pleuritic chest pain and frequency of productive cough. Now s/p thoracentesis 3/21     1. Pneumonia - Dense consolidations within the right middle and lower lobes, a small associated right-sided pleural effusion.  - s/p US guided - Right-sided diagnostic and therapeutic thoracentesis 3/21/18 . 120 cc yellow-brown fluid sent to laboratory  2. Liver cirrhosis   3. Alcohol abuse     Recommendations:  - continue Zosyn for now.  - follow- up on pleural fluid cultures, sputum culture   - CXR today     ID will continue to follow.     Patient staffed with Dr. Andino who agrees with the plan     Bryanna Simons  Infectious disease service  PGY-2 Internal Medicine  Pager: [phone]    Attestation:  This patient has been seen and evaluated by me.  I discussed this patient with the resident Dr Simons and agree with the findings and plan in this note. I also personally edited this note to reflect my findings. I have reviewed today's vital signs, medications, labs and imaging.    Brayden Andino MD,M.Med.Sc.  Attending, Infectious Diseases  Pager: [phone]      ==================================================================  Subjective:  Endorses improvement in cough although still present. Denies any pleuritic chest pain, fever or chills. Feels almost back to baseline. Appetite is improving     Objective:  Most recent vital signs:  BP  117/64 (BP Location: Right arm)  Pulse 93  Temp 98.7  F (37.1  C) (Oral)  Resp 18  Wt 102.5 kg (226 lb)  SpO2 95%  Temp:  [98.7  F (37.1  C)-99.7  F (37.6  C)] 98.7  F (37.1  C)  Pulse:  [93] 93  Heart Rate:  [94-97] 95  Resp:  [16-18] 18  BP: (114-120)/(64-83) 117/64  SpO2:  [95 %-96 %] 95 %  Wt Readings from Last 2 Encounters:   03/18/18 102.5 kg (226 lb)   03/15/18 101.3 kg (223 lb 4.8 oz)     Intake/Output Summary (Last 24 hours) at 03/22/18 1521  Last data filed at 03/22/18 0500   Gross per 24 hour   Intake              480 ml   Output                0 ml   Net              480 ml     Physical exam:  General: Patient lying comfortably in bed, NAD, jaundiced  HEENT: EOMI, PERRL, no scleral icterus or injection, no bruits appreciated, no JVD, MMM  Cardiac: RRR, no m/r/g appreciated.   Respiratory: CTAB, decreased breath sounds in bases R>>L   GI: NABS, NT/ND, no guarding or rebound, distended   Extremities: No LE edema, pulses DP 2+, radial pulses 2+   Skin: No acute lesions appreciated  Neuro: AOx3, CN II-XII grossly intact, normal muscle power    Labs (Past three days):  Reviewed    Imaging/procedure results:   Reviewed    Micro:   Culture Micro   Date Value Ref Range Status   03/21/2018 Culture negative monitoring continues  Preliminary   03/21/2018 Culture negative after 20 hours  Preliminary   03/20/2018 No growth after 2 days  Preliminary   03/20/2018 No growth after 2 days  Preliminary   03/19/2018 (A)  Final    Canceled, Test credited  >10 Squamous epithelial cells/low power field indicates oral contamination. Please   recollect.     03/18/2018 No growth after 4 days  Preliminary   03/18/2018 No growth after 4 days  Preliminary   03/18/2018 No growth after 4 days  Preliminary   03/18/2018 No growth after 4 days  Preliminary

## 2018-03-22 NOTE — PLAN OF CARE
Problem: Patient Care Overview  Goal: Plan of Care/Patient Progress Review  Outcome: Improving  [id number]: Pt A&O, VSS on RA, and up around unit independently.  Pt has no complaints of pain.  Nonproductive cough noted; pt had requested something to help with cough but no new orders placed.  PIV running TKO following IV abx.  Voiding without difficulties; UA needs to be collected, no BM this shift.  Pt is able to call and make needs known.  Will continue to monitor and follow POC.

## 2018-03-22 NOTE — PHARMACY-ADMISSION MEDICATION HISTORY
Admission medication history interview status for the 3/18/2018 admission is complete. See Epic admission navigator for allergy information, pharmacy, prior to admission medications and immunization status.     Medication history interview sources:  Prior medication history by pharmacy done on 3/16 (upon admission to [facility]), review of MAR    Changes made to PTA medication list (reason)  Added: none  Deleted: none  Changed: none    Additional medication history information (including reliability of information, actions taken by pharmacist):  Patient was seen in  ED on 3/16 and has stayed within our system since then.  On the time of admission, the patient had not been taking any over the counter or prescription medications.    During his admission and prior to transfer to , patient was taking the following:  Clonidine 0.1 mg po BID  Folic acid 1 mg po daily  Gabapentin 800 mg po TID (as part of a benzodiazepine sparing alcohol withdrawal protocol)  Lorazepam MSSA protocol  Melatonin 1 mg po qhs PRN  Thiamine 100 mg daily      Prior to Admission medications    Medication Sig Last Dose Taking? Auth Provider   multivitamin, therapeutic with minerals (THERA-VIT-M) TABS tablet Take 1 tablet by mouth daily   Du York MD   cloNIDine (CATAPRES) 0.1 MG tablet Take 1 tablet (0.1 mg) by mouth 2 times daily for 5 days   Joe Garay MD   gabapentin (NEURONTIN) 800 MG tablet Take 1 tablet (800 mg) by mouth 3 times daily for 5 days   Joe Garay MD         Medication history completed by: Ruthy Cornelius, PharmD  March 22, 2018

## 2018-03-22 NOTE — PROGRESS NOTES
[facility], [place]    Internal Medicine Progress Note - [facility] Service    Main Plans for Today   - Continue antibiotics  - Follow pleural cultures    Assessment & Plan   Sylvain Pitts is a 38 year old male admitted on 3/18/2018. He has a history of alcoholic cirrhosis, anxiety, and kidney stones and is admitted for HCAP.     # HCAP vs Aspiration PNA  Patient was febrile in detox, chest x-ray shows atelectasis vs. Pneumonia/aspiration. Procal 0.31. Lactate 2.1. Coarse breath sounds. MRSA swab negative. Sputum culture pending. Diagnostic para negative for SBP. U/A negative. Has been on Zosyn since 3/18. Chest x-ray and CT on 3/21 demonstrated right middle and lower lobe pneumonia with a small associated right pleural effusion. IR performed diagnostic thoracentesis. Fluid consistent with transudate. Pleural fluid cultures pending.  - Continue zosyn (3/18 -> )  - U/A, sputum cultures  - ID consult, appreciate recommendations  - F/u pleural aerobic, AFB, and fungal cultures     # Alcohol withdrawal  Patient is much improved from when he was first admitted on 3/14 and is at his baseline mental status.     # Alcoholic cirrhosis with ascites  Patient complaining of diffuse abdominal pain today, and abdomen is more firm. Diagnostic para was negative for SBP. Therapeutic paracentesis on 3/20. MELD 25.  - Follow-up with GI within one month as an outpatient.    Diet: Combination Diet Regular Diet Adult  Fluids: N/A  DVT Prophylaxis: Low Risk/Ambulatory with no VTE prophylaxis indicated  Code Status: Full Code    Disposition Plan   Expected discharge: 2 - 3 days, recommended to prior living arrangement once antibiotic plan established.     Entered: Cheikh Raymond 03/22/2018, 6:52 AM   Information in the above section will display in the discharge planner report.      The patient's care was discussed with the Attending Physician, Dr. Monge.    Cheikh Raymond  [facility]  [facility]  [facility]: 2  Pager: [phone]  Please see sticky note for cross cover information    Interval History   No acute events overnight.    Patient says he feels okay this morning. Continues to complain of non-productive cough. Denies chest pain, shortness of breath, abdominal pain.    Physical Exam   Vital Signs: Temp: 98.9  F (37.2  C) Temp src: Oral BP: 120/83 Pulse: 96 Heart Rate: 95 Resp: 16 SpO2: 96 % O2 Device: None (Room air)    Weight: 226 lbs 0 oz  General Appearance: Resting comfortably in bed, no acute distress.  Eyes: Icteric sclera  Respiratory: Mildly coarse breath sounds bilaterally  Cardiovascular: Regular rate and rhythm, S1/S2, no m/r/g  GI: Abdomen distended, not tender, soft  Skin: Jaundiced  Other: AOx3         Data   Data     Recent Labs  Lab 03/21/18  1123 03/20/18  0759 03/18/18  1620 03/18/18  0946 03/18/18  0242   WBC 8.0 8.0 7.6  --  8.7   HGB 11.3* 11.2* 11.0*  --  11.3*   * 114* 112*  --  111*   PLT 83* 84* 76*  --  83*   INR 1.89*  --   --  1.84* 1.77*     --  135  --  134   POTASSIUM 3.4  --  3.8  --  3.9   CHLORIDE 105  --  104  --  102   CO2 20  --  23  --  22   BUN 10  --  10  --  12   CR 0.90  --  0.62*  --  0.66   ANIONGAP 11  --  8  --  10   JOHANN 8.0*  --  8.0*  --  8.4*   *  --  107*  --  159*   ALBUMIN 2.4*  --   --   --  2.5*   PROTTOTAL 7.0  --   --   --  7.4   BILITOTAL 20.2*  --   --   --  16.8*   ALKPHOS 125  --   --   --  152*   ALT 52  --   --   --  45   *  --   --   --  106*        Pt was seen and examined by me; he is coughing much less, breathing comfortably, lung exam improved.  I reviewed labs, vitals, imaging.  I agree with the detailed A/P as documented above.  Likely very near discharge, can change to po Augmentin for full course.      Leanne Arrington MD

## 2018-03-22 NOTE — PLAN OF CARE
Problem: Patient Care Overview  Goal: Plan of Care/Patient Progress Review  Outcome: Improving  Pt is AOx4, VSS on RA, afebrile. No c/o pain or nausea. Receiving IV zosyn q6 hrs to RPIV. CXR showed no changes to small R pleural effusion. Hopeful to DC home tmrw on PO abx. Reg diet, good appetite. Calls/makes needs known. Will continue POC.

## 2018-03-23 LAB
ERYTHROCYTE [DISTWIDTH] IN BLOOD BY AUTOMATED COUNT: 17.1 % (ref 10–15)
HCT VFR BLD AUTO: 33.5 % (ref 40–53)
HGB BLD-MCNC: 11.1 G/DL (ref 13.3–17.7)
MCH RBC QN AUTO: 36.6 PG (ref 26.5–33)
MCHC RBC AUTO-ENTMCNC: 33.1 G/DL (ref 31.5–36.5)
MCV RBC AUTO: 111 FL (ref 78–100)
PLATELET # BLD AUTO: 94 10E9/L (ref 150–450)
RBC # BLD AUTO: 3.03 10E12/L (ref 4.4–5.9)
S PNEUM AG SPEC QL: NORMAL
SPECIMEN SOURCE: NORMAL
WBC # BLD AUTO: 7.3 10E9/L (ref 4–11)

## 2018-03-23 PROCEDURE — 36415 COLL VENOUS BLD VENIPUNCTURE: CPT

## 2018-03-23 PROCEDURE — 25000128 H RX IP 250 OP 636: Performed by: STUDENT IN AN ORGANIZED HEALTH CARE EDUCATION/TRAINING PROGRAM

## 2018-03-23 PROCEDURE — 99233 SBSQ HOSP IP/OBS HIGH 50: CPT | Mod: GC | Performed by: INTERNAL MEDICINE

## 2018-03-23 PROCEDURE — 25000132 ZZH RX MED GY IP 250 OP 250 PS 637: Performed by: MARRIAGE & FAMILY THERAPIST

## 2018-03-23 PROCEDURE — 85027 COMPLETE CBC AUTOMATED: CPT

## 2018-03-23 PROCEDURE — 12000001 ZZH R&B MED SURG/OB UMMC

## 2018-03-23 PROCEDURE — 25000132 ZZH RX MED GY IP 250 OP 250 PS 637

## 2018-03-23 RX ORDER — AMOXICILLIN AND CLAVULANATE POTASSIUM 562.5; 437.5; 62.5 MG/1; MG/1; MG/1
2 TABLET, MULTILAYER, EXTENDED RELEASE ORAL EVERY 12 HOURS SCHEDULED
Status: DISCONTINUED | OUTPATIENT
Start: 2018-03-23 | End: 2018-03-24 | Stop reason: HOSPADM

## 2018-03-23 RX ADMIN — AMOXICILLIN AND CLAVULANATE POTASSIUM 2 TABLET: 562.5; 437.5; 62.5 TABLET, MULTILAYER, EXTENDED RELEASE ORAL at 19:41

## 2018-03-23 RX ADMIN — MELATONIN 3 MG: 3 TAB ORAL at 23:40

## 2018-03-23 RX ADMIN — PIPERACILLIN SODIUM AND TAZOBACTAM SODIUM 3.38 G: 3; .375 INJECTION, POWDER, LYOPHILIZED, FOR SOLUTION INTRAVENOUS at 07:58

## 2018-03-23 RX ADMIN — PIPERACILLIN SODIUM AND TAZOBACTAM SODIUM 3.38 G: 3; .375 INJECTION, POWDER, LYOPHILIZED, FOR SOLUTION INTRAVENOUS at 14:07

## 2018-03-23 RX ADMIN — ACETAMINOPHEN 325 MG: 325 TABLET, FILM COATED ORAL at 01:44

## 2018-03-23 RX ADMIN — PIPERACILLIN SODIUM AND TAZOBACTAM SODIUM 3.38 G: 3; .375 INJECTION, POWDER, LYOPHILIZED, FOR SOLUTION INTRAVENOUS at 01:40

## 2018-03-23 NOTE — PROGRESS NOTES
[facility], [place]    Internal Medicine Discharge Summary- [facility] Service    Date of Admission:  3/18/2018  Date of Discharge:  3/24/18  Discharging Attending Provider: Dr. Brush  Discharge Team: Ciara 2    Discharge Diagnoses   HCAP  Alcoholic cirrhosis    Follow-ups Needed After Discharge   Gastroenterology, hepatic: Within one month for evaluation and treatment of alcoholic cirrhosis  PCP: Chest x-ray within one month to ensure clearance of right lower lobe basilar consolidation    History of Present Illness:  Sylvain Pitts is a 38 year old male who has a history of alcoholic cirrhosis, kidney stones, and anxiety and was admitted for fever and alcohol cessation. He was admitted to [facility] on 3/14 for a new diagnosis of alcoholic cirrhosis and for alcohol withdrawal. He had been drinking up to 15 shots of vodka or whiskey daily for the past 7 months. His last drink was on 3/13. He had also noticed increasing yellowing of his skin and eyes for the past 3 months. While he was admitted, he was evaluated by GI and given outpatient follow-up. He was then discharged to detox for alcohol withdrawal on 3/16. He was detoxed off alcohol using MSSA protocol on valium. Overnight on 3/17-3/18 while at detox, the patient became febrile and had one episode of diarrhea. He was given ceftriaxone. Blood cultures were negative. A chest x-ray showed bibasilar opacities consistent with atelectasis/infection. An abdominal ultrasound showed a small volume of ascites. He was transferred back to the [facility] due to concern for infection.    Hospital Course   Sylvain Pitts was admitted on 3/18/2018 for HCAP.  The following problems were addressed during his hospitalization:    # HCAP vs Aspiration PNA  Patient was febrile in detox, chest x-ray showed atelectasis vs. pneumonia/aspiration. Elevated procal. Lactate 2.1. He was started on Zosyn on 3/18. Overnight on 3/20-21, he was febrile to a Tmax  of 102.5. Chest x-ray and CT on 3/21 demonstrated right middle and lower lobe pneumonia with a small associated right pleural effusion. IR performed diagnostic thoracentesis. Fluid consistent with transudate. Pleural fluid cultures were negative to date. Infectious diseases was consulted, and patient remained on zosyn 3/20-3/23. Repeat chest x-ray on 3/22 demonstrated mildly improved right basilar consolidation. He was transitioned to augmentin 3/23, and remained afebrile. He remained hemodynamically stable, afebrile, and comfortable on room air. He also notes resolution of cough on day of discharge. He will be discharged to home with 6 more days of augmentin for a total of 10 days of antibiotics. He will follow up with his PCP in 1 week and have a repeat CXR in 1 month.     # Alcoholic cirrhosis  Patient has mild abdominal distention but denies abdominal pain. He will follow up with hepatology in outpatient clinic.     # Discharge Pain Plan:   - Patient currently has NO PAIN and is not being prescribed pain medications on discharge.    Consultations This Hospital Stay   INFECTIOUS DISEASE GENERAL ADULT IP CONSULT  INTERVENTIONAL RADIOLOGY ADULT/PEDS IP CONSULT  VASCULAR ACCESS CARE ADULT IP CONSULT  MEDICATION HISTORY IP PHARMACY CONSULT    Code Status   Full Code     The patient was discussed with Dr. Brush.    Ellen Ta  [facility]  ______________________________________________________________________    Physical Exam   Vital Signs: Temp: 98.8  F (37.1  C) Temp src: Axillary (pt. recently had po fluid) BP: 133/87 Pulse: 93 Heart Rate: 93 Resp: 16 SpO2: 94 % O2 Device: None (Room air)    Weight: 226 lbs 0 oz    General Appearance: Well appearing, jaundiced, in no acute distress  Respiratory: mildly coarse breath sounds bilaterally  Cardiovascular: RRR, normal S1 and S2, no murmurs/rubs/gallops  GI: soft, mildly distended, non-tender, +BS  Skin: jaundiced, no rashes or  lesions  Neuro: alert and oriented x4, no focal deficits    Significant Results and Procedures   Most Recent 3 CBC's:  Recent Labs   Lab Test  03/23/18   0710  03/22/18   0758  03/21/18   1123   WBC  7.3  7.8  8.0   HGB  11.1*  11.1*  11.3*   MCV  111*  113*  112*   PLT  94*  95*  83*     Most Recent 3 BMP's:  Recent Labs   Lab Test  03/22/18   0758  03/21/18   1123  03/18/18   1620  03/18/18   0242   NA   --   136  135  134   POTASSIUM   --   3.4  3.8  3.9   CHLORIDE   --   105  104  102   CO2   --   20  23  22   BUN   --   10  10  12   CR  0.65*  0.90  0.62*  0.66   ANIONGAP   --   11  8  10   JOHANN   --   8.0*  8.0*  8.4*   GLC   --   115*  107*  159*     Most Recent 2 LFT's:  Recent Labs   Lab Test  03/21/18   1123  03/18/18   0242   AST  111*  106*   ALT  52  45   ALKPHOS  125  152*   BILITOTAL  20.2*  16.8*       Pending Results   These results will be followed up by the patient's PCP.  Unresulted Labs Ordered in the Past 30 Days of this Admission     Date and Time Order Name Status Description    3/21/2018 1355 Fungus Culture, non-blood Preliminary     3/21/2018 1355 AFB Culture Non Blood Preliminary     3/21/2018 1355 Fluid Culture Aerobic Bacterial Preliminary     3/20/2018 1839 Blood culture Preliminary     3/20/2018 1839 Blood culture Preliminary              Primary Care Physician   [facility]    Discharge Disposition   Discharged to home  Condition at discharge: Stable    Discharge Orders     GASTROENTEROLOGY ADULT REF CONSULT ONLY     Reason for your hospital stay   Pneumonia     Adult [facility] Follow-up and recommended labs and tests   Follow up with primary care provider, [facility], within 1 month for a new chest x-ray.  The following labs/tests are recommended: Chest x-ray.  Follow up with GI as outpatient for cirrhosis.    Appointments on [place] and/or [facility] (with [facility] or [facility] provider or service). Call [phone] if you haven't heard  regarding these appointments within 7 days of discharge.     Activity   Your activity upon discharge: activity as tolerated     When to contact your care team   Call your primary doctor if you have any of the following: fever, increasing pain, difficulty breathing.     Full Code     Diet   Follow this diet upon discharge: Orders Placed This Encounter     Combination Diet Regular Diet Adult       Discharge Medications   Current Discharge Medication List      START taking these medications    Details   amoxicillin-clavulanate (AUGMENTIN) 875-125 MG per tablet Take 1 tablet by mouth 2 times daily for 6 days  Qty: 12 tablet, Refills: 0    Associated Diagnoses: HCAP (healthcare-associated pneumonia)         CONTINUE these medications which have NOT CHANGED    Details   multivitamin, therapeutic with minerals (THERA-VIT-M) TABS tablet Take 1 tablet by mouth daily  Qty: 30 each, Refills: 0    Associated Diagnoses: Alcohol abuse         STOP taking these medications       cloNIDine (CATAPRES) 0.1 MG tablet Comments:   Reason for Stopping:         gabapentin (NEURONTIN) 800 MG tablet Comments:   Reason for Stopping:             Allergies   No Known Allergies    Pt was seen and examined by me; confirmed scant crackles at right base on lung exam; reviewed labs, vitals, imaging.  I agree with the detailed A/P as documented above.    Leanne Arrington MD

## 2018-03-23 NOTE — PROGRESS NOTES
INFECTIOUS DISEASE PROGRESS NOTE    Sylvain Pitts ([id number]) admitted on 3/18/2018  03/23/2018      Reason for consult:  Non-resolving pneumonia     --------------------------------------------------------------  ASSESSMENT & PLAN :     Sylvain Pitts is a 38 year old male with history of alcoholic cirrhosis, anxiety and alochol withdrawal admitted for management of HCAP which was initially not improving on antibiotics. At interview today, Patient says he feels subjectively improved compared to prior days with improvement in subjective fever, chills, pleuritic chest pain and frequency of productive cough. Now s/p thoracentesis 3/21     1. Pneumonia - Dense consolidations within the right middle and lower lobes, a small associated right-sided pleural effusion.  - s/p US guided - Right-sided diagnostic and therapeutic thoracentesis 3/21/18 . 120 cc yellow-brown fluid sent to laboratory  2. Liver cirrhosis   3. Alcohol abuse     Recommendations:  - Can transition from Zosyn to Augmentin 875mg BID for a total of a 10 day course.  - follow-up with Primary doctor      ID will sign off at this time. Please do not hesitate to reach out with any questions or concerns.     Patient staffed with Dr. Andino who agrees with the plan     [facility]  Infectious disease service  PGY-2 Internal Medicine  Pager: [phone]    Attestation:  This patient has been seen and evaluated by me.  I discussed this patient with the resident Dr Hutson  and agree with the findings and plan in this note. I also personally edited this note to reflect my findings. I have reviewed today's vital signs, medications, labs and imaging.    Brayden Andino MD,M.Med.Sc.  Attending, Infectious Diseases  Pager: [phone]        -------------------------------------------------------------------------    Subjective:  Feels back to baseline. Minimal cough. No other s/s. Requesting to ho home , appetite is better. No  diarrhea.     Objective:  Most recent vital signs:  /74 (BP Location: Right arm)  Pulse 93  Temp 99.4  F (37.4  C) (Oral)  Resp 18  Wt 102.5 kg (226 lb)  SpO2 94%  Temp:  [98.2  F (36.8  C)-99.5  F (37.5  C)] 99.4  F (37.4  C)  Heart Rate:  [88-94] 92  Resp:  [16-18] 18  BP: (118-133)/(68-87) 122/74  SpO2:  [94 %-96 %] 94 %  Wt Readings from Last 2 Encounters:   03/18/18 102.5 kg (226 lb)   03/15/18 101.3 kg (223 lb 4.8 oz)     Intake/Output Summary (Last 24 hours) at 03/22/18 1521  Last data filed at 03/22/18 0500   Gross per 24 hour   Intake              480 ml   Output                0 ml   Net              480 ml     Physical exam:  General: Patient lying comfortably in bed, NAD, jaundiced  HEENT: EOMI, PERRL, no scleral icterus or injection, no bruits appreciated, no JVD, MMM  Cardiac: RRR, no m/r/g appreciated.   Respiratory: CTAB, decreased breath sounds in bases R>>L   GI: NABS, NT but distended, no guarding or rebound, distended   Extremities: 1+ pitting LE edema, pulses DP 2+, radial pulses 2+   Skin: No acute lesions appreciated  Neuro: AOx3, CN II-XII grossly intact, normal muscle power    Labs (Past three days):  Reviewed    Imaging/procedure results:   Reviewed    Micro:   Culture Micro   Date Value Ref Range Status   03/22/2018 Canceled, Test credited  Final   03/22/2018 (A)  Final    >10 Squamous epithelial cells/low power field indicates oral contamination. Please   recollect.     03/22/2018   Final    Notification of test cancellation was given to  Mia Robertson RN on 3.22.2018 at 2207. KVO     03/22/2018 Canceled, Test credited  Duplicate request    Final   03/21/2018 Culture negative monitoring continues  Preliminary   03/21/2018 Culture negative after 2 days  Preliminary   03/20/2018 No growth after 3 days  Preliminary   03/20/2018 No growth after 3 days  Preliminary   03/19/2018 (A)  Final    Canceled, Test credited  >10 Squamous epithelial cells/low power field indicates oral  contamination. Please   recollect.     03/18/2018 No growth after 5 days  Preliminary   03/18/2018 No growth after 5 days  Preliminary   03/18/2018 No growth after 5 days  Preliminary   03/18/2018 No growth after 5 days  Preliminary

## 2018-03-23 NOTE — PLAN OF CARE
Problem: Patient Care Overview  Goal: Plan of Care/Patient Progress Review  Outcome: Improving  [id number] Pt A&O, VSS on RA, and afebrile overnight.  Pt up independently around room.  Complaining of mild shoulder pain overnight; PRN tylenol given x 1.  Pt otherwise has no complaints.  PIV saline locked following intermittent IV abx.  Tolerating diet.  No problems voiding; no reported BM to staff.  Sputum sample sent earlier on 3/22 was cancelled due to contamination; new order placed and sample needs to be collected.  Pt eager to discharge.  Possibility of discharge today.  Makes needs known.  Will continue to monitor and follow POC.

## 2018-03-23 NOTE — PROGRESS NOTES
[facility], [place]    Internal Medicine Progress Note - [facility] Service    Main Plans for Today   - Discontinue Zosyn.  - Start Augmentin    Assessment & Plan   Sylvain Pitts is a 38 year old male admitted on 3/18/2018. He has a history of alcoholic cirrhosis, anxiety, and kidney stones and is admitted for HCAP.     # HCAP vs Aspiration PNA  Patient was febrile in detox, chest x-ray shows atelectasis vs. Pneumonia/aspiration. Procal 0.31. Lactate 2.1. Coarse breath sounds. MRSA swab negative. Sputum culture pending. Diagnostic para negative for SBP. U/A negative. Has been on Zosyn since 3/18. Chest x-ray and CT on 3/21 demonstrated right middle and lower lobe pneumonia with a small associated right pleural effusion. IR performed diagnostic thoracentesis. Fluid consistent with transudate. Pleural fluid cultures negative to date. Repeat chest x-ray on 3/22 demonstrated unchanged right basilar consolidation.  - Discontinue zosyn (3/18 -> 3/23)  - Start augmentin (3/23 -> )  - Total of 10 day course of antibiotics  - ID consult, appreciate recommendations     # Alcohol withdrawal  Patient is much improved from when he was first admitted on 3/14 and is at his baseline mental status.     # Alcoholic cirrhosis with ascites  Patient complaining of diffuse abdominal pain today, and abdomen is more firm. Diagnostic para was negative for SBP. Therapeutic paracentesis on 3/20. MELD 25.  - Follow-up with GI within one month as an outpatient.    Diet: Combination Diet Regular Diet Adult  Fluids: N/A  DVT Prophylaxis: Low Risk/Ambulatory with no VTE prophylaxis indicated  Code Status: Full Code    Disposition Plan   Expected discharge: Tomorrow, recommended to prior living arrangement once antibiotic plan established.     Entered: Cheikh Raymond 03/23/2018, 6:44 AM   Information in the above section will display in the discharge planner report.      The patient's care was discussed with the Attending  Physician, Dr. Monge.    Cheikh Raymond  [facility]  Maroon: 2  Pager: [phone]  Please see sticky note for cross cover information    Interval History   No acute events overnight.    Patient says he feels good this morning. Had some coughing last night. Denies chest pain, shortness of breath, abdominal pain.    Physical Exam   Vital Signs: Temp: 98.3  F (36.8  C) Temp src: Oral BP: 118/75 Pulse: 93 Heart Rate: 88 Resp: 16 SpO2: 95 % O2 Device: None (Room air)    Weight: 226 lbs 0 oz  General Appearance: Resting comfortably in bed, no acute distress.  Eyes: Icteric sclera  Respiratory: Mildly coarse breath sounds bilaterally, improving.  Cardiovascular: Regular rate and rhythm, S1/S2, no m/r/g  GI: Abdomen distended, not tender, soft  Skin: Jaundiced  Other: AOx3         Data   Data     Recent Labs  Lab 03/22/18  0758 03/21/18  1123 03/20/18  0759 03/18/18  1620 03/18/18  0946 03/18/18  0242   WBC 7.8 8.0 8.0 7.6  --  8.7   HGB 11.1* 11.3* 11.2* 11.0*  --  11.3*   * 112* 114* 112*  --  111*   PLT 95* 83* 84* 76*  --  83*   INR  --  1.89*  --   --  1.84* 1.77*   NA  --  136  --  135  --  134   POTASSIUM  --  3.4  --  3.8  --  3.9   CHLORIDE  --  105  --  104  --  102   CO2  --  20  --  23  --  22   BUN  --  10  --  10  --  12   CR 0.65* 0.90  --  0.62*  --  0.66   ANIONGAP  --  11  --  8  --  10   JOHANN  --  8.0*  --  8.0*  --  8.4*   GLC  --  115*  --  107*  --  159*   ALBUMIN  --  2.4*  --   --   --  2.5*   PROTTOTAL  --  7.0  --   --   --  7.4   BILITOTAL  --  20.2*  --   --   --  16.8*   ALKPHOS  --  125  --   --   --  152*   ALT  --  52  --   --   --  45   AST  --  111*  --   --   --  106*       Pt was seen and examined by me; lung exam improving; reviewed labs, vitals, imaging.  I agree with the detailed A/P as documented above.    Leanne Arrington MD

## 2018-03-23 NOTE — PLAN OF CARE
Problem: Patient Care Overview  Goal: Plan of Care/Patient Progress Review  Outcome: Improving  VSS, afebrile. AOx4, up ab lana, steady on feet. Denies pain/n/v. Paracentesis site on CDI- abdomen remains distended/rounded. Pt transitioned onto oral antibiotics today. PIV saline locked. Plan for possible d/c home tomorrow. Calls appropriately. Will continue to follow POC/monitor.   Pt waived - service- form in chart-  present when patient signed waiver

## 2018-03-24 ENCOUNTER — APPOINTMENT (OUTPATIENT)
Dept: ULTRASOUND IMAGING | Facility: CLINIC | Age: 38
DRG: 178 | End: 2018-03-24
Attending: INTERNAL MEDICINE
Payer: COMMERCIAL

## 2018-03-24 VITALS
TEMPERATURE: 98.6 F | OXYGEN SATURATION: 93 % | DIASTOLIC BLOOD PRESSURE: 56 MMHG | WEIGHT: 226 LBS | RESPIRATION RATE: 18 BRPM | SYSTOLIC BLOOD PRESSURE: 112 MMHG | HEART RATE: 93 BPM

## 2018-03-24 LAB
ACID FAST STN SPEC QL: NORMAL
ACID FAST STN SPEC QL: NORMAL
BACTERIA SPEC CULT: NO GROWTH
Lab: NORMAL
Lab: NORMAL
SPECIMEN SOURCE: NORMAL

## 2018-03-24 PROCEDURE — 93971 EXTREMITY STUDY: CPT | Mod: RT

## 2018-03-24 PROCEDURE — 99238 HOSP IP/OBS DSCHRG MGMT 30/<: CPT | Performed by: INTERNAL MEDICINE

## 2018-03-24 PROCEDURE — 25000132 ZZH RX MED GY IP 250 OP 250 PS 637

## 2018-03-24 RX ORDER — LANOLIN ALCOHOL/MO/W.PET/CERES
3 CREAM (GRAM) TOPICAL
Qty: 30 TABLET | Refills: 3 | Status: SHIPPED | OUTPATIENT
Start: 2018-03-24 | End: 2018-04-18

## 2018-03-24 RX ORDER — AMOXICILLIN AND CLAVULANATE POTASSIUM 562.5; 437.5; 62.5 MG/1; MG/1; MG/1
2 TABLET, MULTILAYER, EXTENDED RELEASE ORAL EVERY 12 HOURS
Qty: 24 TABLET | Refills: 0 | Status: SHIPPED | OUTPATIENT
Start: 2018-03-24 | End: 2018-03-24

## 2018-03-24 RX ADMIN — AMOXICILLIN AND CLAVULANATE POTASSIUM 2 TABLET: 562.5; 437.5; 62.5 TABLET, MULTILAYER, EXTENDED RELEASE ORAL at 08:36

## 2018-03-24 NOTE — PLAN OF CARE
Problem: Patient Care Overview  Goal: Plan of Care/Patient Progress Review  Outcome: Adequate for Discharge Date Met: 03/24/18  Pt VSS on room air, AOx4, up ab lana, steady on feet. Pt denies pain/n/v. Pt taking Augmentin for pneumonia. PIV removed for discharge- removed without incident, canula intact. Discharge orders in place, reviewed with pt- Discharge pharmacy will be used for home medications. All belongings gathered by pt. Paracentesis site CDI. Calls appropriately. Will continue to follow POC/susanneior.

## 2018-03-24 NOTE — PLAN OF CARE
Problem: Patient Care Overview  Goal: Plan of Care/Patient Progress Review  Outcome: Improving  /74 (BP Location: Right arm)  Pulse 93  Temp 99.4  F (37.4  C) (Oral)  Resp 18  Wt 102.5 kg (226 lb)  SpO2 94%    Admitted for HCAP. Patient A&Ox4, Gabonese speaking, but able to communicate needs in English. Up ind. Denies pain. Patient concerned about swelling in RLE, requesting diurectics, MD notified, patient encouraged to elevate extremity. Anticipate discharge tomorrow. Will continue to monitor.

## 2018-03-24 NOTE — PLAN OF CARE
Problem: Patient Care Overview  Goal: Plan of Care/Patient Progress Review  Outcome: Improving  Pt A&Ox4, VSS on RA.  Up independently.  Slept through the night after PRN melatonin.  R PIV SL.  Continue to monitor and follow POC.

## 2018-03-25 ENCOUNTER — TELEPHONE (OUTPATIENT)
Dept: ONCOLOGY | Facility: CLINIC | Age: 38
End: 2018-03-25

## 2018-03-25 NOTE — TELEPHONE ENCOUNTER
"Patient called [facility]  to report that he is having \"bloody diarrhea\" after being discharged on antibiotics from the hospital. Patient was advised to go to the Emergency Room and seek further medical assistance.   "

## 2018-03-25 NOTE — SUMMARY OF CARE
Pt called unit desk to report that he was recently discharged but is now having bloody diarrhea. Pt was advised to go to an Emergency room for further treatment/work-up.

## 2018-03-26 ENCOUNTER — CARE COORDINATION (OUTPATIENT)
Dept: CARE COORDINATION | Facility: CLINIC | Age: 38
End: 2018-03-26

## 2018-03-26 LAB
BACTERIA SPEC CULT: NO GROWTH
BACTERIA SPEC CULT: NO GROWTH
SPECIMEN SOURCE: NORMAL
SPECIMEN SOURCE: NORMAL

## 2018-03-26 NOTE — DISCHARGE SUMMARY
[facility], [place]    Internal Medicine Discharge Summary- [facility]    Date of Admission:  3/18/2018  Date of Discharge:  3/24/18  Discharging Attending Provider: Dr. Brush  Discharge Team: [facility]    Discharge Diagnoses   HCAP  Alcoholic cirrhosis    Follow-ups Needed After Discharge   Gastroenterology, hepatic: Within one month for evaluation and treatment of alcoholic cirrhosis  PCP: Chest x-ray within one month to ensure clearance of right lower lobe basilar consolidation    History of Present Illness:  Sylvain Pitts is a 38 year old male who has a history of alcoholic cirrhosis, kidney stones, and anxiety and was admitted for fever and alcohol cessation. He was admitted to [facility] on 3/14 for a new diagnosis of alcoholic cirrhosis and for alcohol withdrawal. He had been drinking up to 15 shots of vodka or whiskey daily for the past 7 months. His last drink was on 3/13. He had also noticed increasing yellowing of his skin and eyes for the past 3 months. While he was admitted, he was evaluated by GI and given outpatient follow-up. He was then discharged to detox for alcohol withdrawal on 3/16. He was detoxed off alcohol using MSSA protocol on valium. Overnight on 3/17-3/18 while at detox, the patient became febrile and had one episode of diarrhea. He was given ceftriaxone. Blood cultures were negative. A chest x-ray showed bibasilar opacities consistent with atelectasis/infection. An abdominal ultrasound showed a small volume of ascites. He was transferred back to the [place] due to concern for infection.    Hospital Course   Sylvain Pitts was admitted on 3/18/2018 for HCAP.  The following problems were addressed during his hospitalization:    # HCAP vs Aspiration PNA  Patient was febrile in detox, chest x-ray showed atelectasis vs. pneumonia/aspiration. Elevated procal. Lactate 2.1. He was started on Zosyn on 3/18. Overnight on 3/20-21, he was febrile to a Tmax  of 102.5. Chest x-ray and CT on 3/21 demonstrated right middle and lower lobe pneumonia with a small associated right pleural effusion. IR performed diagnostic thoracentesis. Fluid consistent with transudate. Pleural fluid cultures were negative to date. Infectious diseases was consulted, and patient remained on zosyn 3/20-3/23. Repeat chest x-ray on 3/22 demonstrated mildly improved right basilar consolidation. He was transitioned to augmentin 3/23, and remained afebrile. He remained hemodynamically stable, afebrile, and comfortable on room air. He also notes resolution of cough on day of discharge. He will be discharged to home with 6 more days of augmentin for a total of 10 days of antibiotics. He will follow up with his PCP in 1 week and have a repeat CXR in 1 month.     # Alcoholic cirrhosis  Patient has mild abdominal distention but denies abdominal pain. He will follow up with hepatology in outpatient clinic.     # Discharge Pain Plan:   - Patient currently has NO PAIN and is not being prescribed pain medications on discharge.    Consultations This Hospital Stay   INFECTIOUS DISEASE GENERAL ADULT IP CONSULT  INTERVENTIONAL RADIOLOGY ADULT/PEDS IP CONSULT  VASCULAR ACCESS CARE ADULT IP CONSULT  MEDICATION HISTORY IP PHARMACY CONSULT    Code Status   Full Code     The patient was discussed with Dr. Brush.    Nury Monge  [facility]  ______________________________________________________________________    Physical Exam   Vital Signs:                    Weight: 226 lbs 0 oz    General Appearance: Well appearing, jaundiced, in no acute distress  Respiratory: mildly coarse breath sounds bilaterally  Cardiovascular: RRR, normal S1 and S2, no murmurs/rubs/gallops  GI: soft, mildly distended, non-tender, +BS  Skin: jaundiced, no rashes or lesions  Neuro: alert and oriented x4, no focal deficits    Significant Results and Procedures   Most Recent 3 CBC's:  Recent Labs   Lab Test   03/23/18   0710  03/22/18   0758  03/21/18   1123   WBC  7.3  7.8  8.0   HGB  11.1*  11.1*  11.3*   MCV  111*  113*  112*   PLT  94*  95*  83*     Most Recent 3 BMP's:  Recent Labs   Lab Test  03/22/18   0758  03/21/18   1123  03/18/18   1620  03/18/18   0242   NA   --   136  135  134   POTASSIUM   --   3.4  3.8  3.9   CHLORIDE   --   105  104  102   CO2   --   20 23  22   BUN   --   10  10  12   CR  0.65*  0.90  0.62*  0.66   ANIONGAP   --   11  8  10   JOHANN   --   8.0*  8.0*  8.4*   GLC   --   115*  107*  159*     Most Recent 2 LFT's:  Recent Labs   Lab Test  03/21/18   1123  03/18/18   0242   AST  111*  106*   ALT  52  45   ALKPHOS  125  152*   BILITOTAL  20.2*  16.8*       Pending Results   These results will be followed up by the patient's PCP.  Unresulted Labs Ordered in the Past 30 Days of this Admission     Date and Time Order Name Status Description    3/21/2018 1355 Fungus Culture, non-blood Preliminary     3/21/2018 1355 AFB Culture Non Blood Preliminary     3/21/2018 1355 Fluid Culture Aerobic Bacterial Preliminary     3/20/2018 1839 Blood culture Preliminary     3/20/2018 1839 Blood culture Preliminary              Primary Care Physician   [facility]    Discharge Disposition   Discharged to home  Condition at discharge: Stable    Discharge Orders     GASTROENTEROLOGY ADULT REF CONSULT ONLY     Reason for your hospital stay   Pneumonia     Adult [facility] Follow-up and recommended labs and tests   Follow up with primary care provider, [facility], within 1 month for a new chest x-ray.  The following labs/tests are recommended: Chest x-ray.  Follow up with GI as outpatient for cirrhosis.    Appointments on [place] and/or [facility] (with [facility] or [facility] provider or service). Call [phone] if you haven't heard regarding these appointments within 7 days of discharge.     Activity   Your activity upon discharge: activity as tolerated     When to contact  your care team   Call your primary doctor if you have any of the following: fever, increasing pain, difficulty breathing.     Full Code     Diet   Follow this diet upon discharge: Orders Placed This Encounter     Combination Diet Regular Diet Adult       Discharge Medications   Discharge Medication List as of 3/24/2018 11:54 AM      START taking these medications    Details   melatonin 3 MG tablet Take 1 tablet (3 mg) by mouth nightly as needed for sleep, Disp-30 tablet, R-3, E-Prescribe         CONTINUE these medications which have CHANGED    Details   amoxicillin-clavulanate (AUGMENTIN) 875-125 MG per tablet Take 1 tablet by mouth 2 times daily for 6 days, Disp-12 tablet, R-0, E-Prescribe         CONTINUE these medications which have NOT CHANGED    Details   multivitamin, therapeutic with minerals (THERA-VIT-M) TABS tablet Take 1 tablet by mouth daily, Disp-30 each, R-0, E-Prescribe         STOP taking these medications       amoxicillin-clavulanate (AUGMENTIN XR) 1000-62.5 MG per 12 hr tablet Comments:   Reason for Stopping:         cloNIDine (CATAPRES) 0.1 MG tablet Comments:   Reason for Stopping:         gabapentin (NEURONTIN) 800 MG tablet Comments:   Reason for Stopping:             Allergies   No Known Allergies    Pt was seen and examined by me on the day of discharge; confirmed scant crackles at right base on lung exam; I agree with the detailed A/P and follow up plans as documented above.    Leanne Arrington MD

## 2018-03-27 ENCOUNTER — HOSPITAL ENCOUNTER (EMERGENCY)
Facility: CLINIC | Age: 38
Discharge: HOME | End: 2018-03-27
Attending: EMERGENCY MEDICINE | Admitting: EMERGENCY MEDICINE
Payer: COMMERCIAL

## 2018-03-27 VITALS
OXYGEN SATURATION: 94 % | RESPIRATION RATE: 18 BRPM | TEMPERATURE: 98.4 F | BODY MASS INDEX: 29.55 KG/M2 | WEIGHT: 223 LBS | SYSTOLIC BLOOD PRESSURE: 136 MMHG | HEIGHT: 73 IN | DIASTOLIC BLOOD PRESSURE: 88 MMHG

## 2018-03-27 DIAGNOSIS — R19.7 DIARRHEA, UNSPECIFIED TYPE: ICD-10-CM

## 2018-03-27 DIAGNOSIS — K70.31 ALCOHOLIC CIRRHOSIS OF LIVER WITH ASCITES (H): ICD-10-CM

## 2018-03-27 LAB
ALBUMIN SERPL-MCNC: 2.5 G/DL (ref 3.4–5)
ALP SERPL-CCNC: 125 U/L (ref 40–150)
ALT SERPL W P-5'-P-CCNC: 77 U/L (ref 0–70)
ANION GAP SERPL CALCULATED.3IONS-SCNC: 11 MMOL/L (ref 3–14)
AST SERPL W P-5'-P-CCNC: 152 U/L (ref 0–45)
BASOPHILS # BLD AUTO: 0.1 10E9/L (ref 0–0.2)
BASOPHILS NFR BLD AUTO: 0.5 %
BILIRUB SERPL-MCNC: 19.5 MG/DL (ref 0.2–1.3)
BUN SERPL-MCNC: 9 MG/DL (ref 7–30)
CALCIUM SERPL-MCNC: 8.3 MG/DL (ref 8.5–10.1)
CHLORIDE SERPL-SCNC: 104 MMOL/L (ref 94–109)
CO2 SERPL-SCNC: 21 MMOL/L (ref 20–32)
CREAT SERPL-MCNC: 0.68 MG/DL (ref 0.66–1.25)
DIFFERENTIAL METHOD BLD: ABNORMAL
EOSINOPHIL # BLD AUTO: 0.2 10E9/L (ref 0–0.7)
EOSINOPHIL NFR BLD AUTO: 1.4 %
ERYTHROCYTE [DISTWIDTH] IN BLOOD BY AUTOMATED COUNT: 16.7 % (ref 10–15)
GFR SERPL CREATININE-BSD FRML MDRD: >90 ML/MIN/1.7M2
GLUCOSE SERPL-MCNC: 128 MG/DL (ref 70–99)
HCT VFR BLD AUTO: 35.1 % (ref 40–53)
HGB BLD-MCNC: 11.9 G/DL (ref 13.3–17.7)
IMM GRANULOCYTES # BLD: 0.1 10E9/L (ref 0–0.4)
IMM GRANULOCYTES NFR BLD: 0.8 %
INR PPP: 1.89 (ref 0.86–1.14)
LIPASE SERPL-CCNC: 419 U/L (ref 73–393)
LYMPHOCYTES # BLD AUTO: 1 10E9/L (ref 0.8–5.3)
LYMPHOCYTES NFR BLD AUTO: 8.1 %
MCH RBC QN AUTO: 37.7 PG (ref 26.5–33)
MCHC RBC AUTO-ENTMCNC: 33.9 G/DL (ref 31.5–36.5)
MCV RBC AUTO: 111 FL (ref 78–100)
MONOCYTES # BLD AUTO: 1.3 10E9/L (ref 0–1.3)
MONOCYTES NFR BLD AUTO: 11 %
NEUTROPHILS # BLD AUTO: 9.5 10E9/L (ref 1.6–8.3)
NEUTROPHILS NFR BLD AUTO: 78.2 %
NRBC # BLD AUTO: 0 10*3/UL
NRBC BLD AUTO-RTO: 0 /100
PLATELET # BLD AUTO: 129 10E9/L (ref 150–450)
POTASSIUM SERPL-SCNC: 4.1 MMOL/L (ref 3.4–5.3)
PROT SERPL-MCNC: 8 G/DL (ref 6.8–8.8)
RBC # BLD AUTO: 3.16 10E12/L (ref 4.4–5.9)
SODIUM SERPL-SCNC: 136 MMOL/L (ref 133–144)
WBC # BLD AUTO: 12.2 10E9/L (ref 4–11)

## 2018-03-27 PROCEDURE — 99283 EMERGENCY DEPT VISIT LOW MDM: CPT

## 2018-03-27 PROCEDURE — 85610 PROTHROMBIN TIME: CPT | Performed by: EMERGENCY MEDICINE

## 2018-03-27 PROCEDURE — 85025 COMPLETE CBC W/AUTO DIFF WBC: CPT | Performed by: EMERGENCY MEDICINE

## 2018-03-27 PROCEDURE — 83690 ASSAY OF LIPASE: CPT | Performed by: EMERGENCY MEDICINE

## 2018-03-27 PROCEDURE — 80053 COMPREHEN METABOLIC PANEL: CPT | Performed by: EMERGENCY MEDICINE

## 2018-03-27 ASSESSMENT — ENCOUNTER SYMPTOMS
RESPIRATORY NEGATIVE: 1
COLOR CHANGE: 1
ABDOMINAL PAIN: 0
BLOOD IN STOOL: 0
DIARRHEA: 1
NAUSEA: 0
VOMITING: 0
CONSTITUTIONAL NEGATIVE: 1
ABDOMINAL DISTENTION: 1

## 2018-03-27 NOTE — ED AVS SNAPSHOT
Emergency Department    [address] [place] [address]    Phone:  [phone]    Fax:  [phone]                                       Sylvain Pitts   MRN: [id number]    Department:   Emergency Department   Date of Visit:  3/27/2018           Patient Information     Date Of Birth          1980        Your diagnoses for this visit were:     Alcoholic cirrhosis of liver with ascites (H)     Diarrhea, unspecified type        You were seen by Ross Douglass MD.      Follow-up Information     Please follow up.    Why:  try Imodium for diarrhea - follow up with the gastroenterologist next week - be assessed again in the meantime if feeling worse (more diarrhea, fever, abdominal pain, confused)        Discharge Instructions         ?????????? ????? ??????? ?? ????????: ?????? ??????  ??? ????????? ???????  ?????? ??????  (liver cirrhosis). ?????? - ??? ??????????? ??????????? ??????, ??? ??????? ????? ?????? ??????????? ? ?????????? ???????? ??????. ????????? ??????? ?????? ????????? ???????? ?????? ? ??????????? ??????????. ?? ?????? ?????? ??????????? ???????? ??????? ????? ????? ?? ??????????? ??? ?? ???????? ???????? ????????????, ?????? ?????? ????????? ???????? ???????????? ??????????. ??????????? ????????????? ??????? ?????? ??????????, ???? ?? ?????-???? ?????????????? ?????????. ?????? - ??????????? ???????????, ?????? ??? ??????????????? ??????? ????????? ???????? ????? ?????????????????.   ???????? ????    ?? ???????????? ????????. ???? ?? ??????? ???? ???????, ?? ?????? ??????????? ???? ????? ? ????????? ??????? ??????.    ?????????? ? ????????? ?????? ?????????, ????? ??? ???????? ????????? ???????????.    ????????? ???????? ????? ???????? ?????????, ??????? ??????? ??? ??????? ????.    ????????? ?? ??????? ????.    ?????????? ???????????? ????????????????, ?????, ?????????? ????????? ? ???? ???????? ?????????????.    ?? ??????????? ??????? ????.    ??? ?????????????  ?????????? ? ???? ???????? ?????? ????.    ?????????? ????????? ?????? ? ???????????? ? ??????????.    ???????? ???????? ?????, ????? ?????????? ????????? ??? ??????? ?????????. ??? ????? ??????? ?????????, ?????????? ???????? ?, ?, D ? ?????? (??????? ?1).    ?? ???????????? ??????? ? ?????? ?????????, ?????????????? ?????????? ?????.    ??????? ? ?????, ????? ????? ??? ??????? ?????????. ????????, ??? ???????? ?????????? ??????????? ???????????? ?????????.  ??????????? ????????? ??????????    ????????? ??????????? ??????? ? ???????????? ? ?????????? ???????????? ?????????.     ????? ???????? ?????  ?????????? ????????? ?????, ???? ? ??? ??????????? ?????-???? ?? ????????? ?????????:    ?????????, ????????, ?????? ????????    ????? (? ?????? ??? ???)    ?????????? ???? ??? ???? (???????)    ???    ????? ?????? ??? ???    ?????? ??? ????????? ????    ?? ???? ? ????????? ?????????? ??????    ?????????????, ?????????? ????????   Date Last Reviewed: 3/28/2014    9333-6134 [employer]. [address] [place] [address], [place] [address]. All rights reserved. This information is not intended as a substitute for professional medical care. Always follow your healthcare professional's instructions.      Discharge Instructions for Cirrhosis of the Liver  You have been diagnosed with cirrhosis of the liver. This is a long-term (chronic) problem. It occurs when liver tissue is destroyed and replaced by scar tissue. Causes of cirrhosis include:    Infection such as viral hepatitis    Chronic alcoholism    The body s immune system attacks healthy cells (autoimmune disorders)    Obesity    Medicine side effects    Genetic diseases  Sometimes the exact cause is unknown. You may not have any symptoms at first. Or your symptoms may be mild. But they usually get worse. Cirrhosis is likely to occur if you have a history of long-term alcohol abuse. Cirrhosis can t be cured. But it can be treated.   Home care  Alcohol    People with  liver disease should not drink alcohol. If you stop drinking, you may feel better and live longer.    If you are a chronic alcohol user, you will have withdrawal symptoms. Talk with your healthcare provider for more information.      If alcohol is a problem, ask your provider about medicine that can help you quit drinking.      Find a local Alcoholics Anonymous support group online at www.aa.org.  Diet    Ask your provider what kind of diet you should follow. You may be asked to limit or not eat certain foods. Do not limit your protein intake.    Weigh yourself daily and keep a weight log. If you have a sudden change in weight, call your provider.    Cut back on salt:    Limit canned, dried, packaged, and fast foods.    Don t add salt to your food at the table.    Season foods with herbs instead of salt when you cook.  Medicines, supplements, and vaccines    Take your medicines exactly as directed.    Talk to your provider before taking vitamins, over-the-counter medicines, or herbal supplements. Many herbal supplements may be poisonous (toxic) to the liver.    Avoid aspirin and other blood-thinning medicines.    Discuss vitamin supplements and deficiencies with your provider.    Ask your provider about getting vaccinations for viruses that can cause liver diseases.  Follow-up care  Follow up with your healthcare provider, or as advised. You will likely have the following tests:    Lab tests    Blood tests for liver cancer    Ultrasound of your liver every 6 months    Endoscopy to check for swollen veins (varices) in your digestive tract  When to call your provider  Call your healthcare provider right away if you have any of the following:    Fever of 100.4 F (38.0 C) or higher    Extreme tiredness (fatigue), weakness, or lack of appetite    Vomiting (with or without blood)    Yellowing of your skin or eyes (jaundice)    Itching    Swelling in your belly or legs    Black or tarry stools    Skin that bruises  easily    Confusion or trouble thinking clearly   Date Last Reviewed: 8/1/2016 2000-2017 [employer]. [address] [place] [address], [place] [address]. All rights reserved. This information is not intended as a substitute for professional medical care. Always follow your healthcare professional's instructions.      Uncertain Causes of Diarrhea (Adult)    Diarrhea is when stools are loose and watery. This can be caused by:    Viral infections    Bacterial infections    Food poisoning    Parasites    Irritable bowel syndrome (IBS)    Inflammatory bowel diseases such as ulcerative colitis, Crohn's disease, and celiac disease    Food intolerance, such as to lactose, the sugar found in milk and milk products    Reaction to medicines like antibiotics, laxatives, cancer drugs, and antacids  Along with diarrhea, you may also have:    Abdominal pain and cramping    Nausea and vomiting    Loss of bowel control    Fever and chills    Bloody stools  In some cases, antibiotics may help to treat diarrhea. You may have a stool sample test. This is done to see what is causing your diarrhea, and if antibiotics will help treat it. The results of a stool sample test may take up to 2 days. The healthcare provider may not give you antibiotics until he or she has the stool test results.  Diarrhea can cause dehydration. This is the loss of too much water and other fluids from the body. When this occurs, body fluid must be replaced. This can be done with oral rehydration solutions. Oral rehydration solutions are available at drugstores and grocery stores without a prescription.  Home care  Follow all instructions given by your healthcare provider. Rest at home for the next 24 hours, or until you feel better. Avoid caffeine, tobacco, and alcohol. These can make diarrhea, cramping, and pain worse.  If taking medicines:    Don t take over-the-counter diarrhea or nausea medicines unless your healthcare provider tells you to.    You  may use acetaminophen or NSAID medicines like ibuprofen or naproxen to reduce pain and fever. Don t use these if you have chronic liver or kidney disease, or ever had a stomach ulcer or gastrointestinal bleeding. Don't use NSAID medicines if you are already taking one for another condition (like arthritis) or are on daily aspirin therapy (such as for heart disease or after a stroke). Talk with your healthcare provider first.    If antibiotics were prescribed, be sure you take them until they are finished. Don t stop taking them even when you feel better. Antibiotics must be taken as a full course.  To prevent the spread of illness:    Remember that washing with soap and water and using alcohol-based  is the best way to prevent the spread of infection.    Clean the toilet after each use.    Wash your hands before eating.    Wash your hands before and after preparing food. Keep in mind that people with diarrhea or vomiting should not prepare food for others.    Wash your hands after using cutting boards, countertops, and knives that have been in contact with raw foods.    Wash and then peel fruits and vegetables.    Keep uncooked meats away from cooked and ready-to-eat foods.    Use a food thermometer when cooking. Cook poultry to at least 165 F (74 C). Cook ground meat (beef, veal, pork, lamb) to at least 160 F (71 C). Cook fresh beef, veal, lamb, and pork to at least 145 F (63 C).    Don t eat raw or undercooked eggs (poached or bhargavi side up), poultry, meat, or unpasteurized milk and juices.  Food and drinks  The main goal while treating vomiting or diarrhea is to prevent dehydration. This is done by taking small amounts of liquids often.    Keep in mind that liquids are more important than food right now.    Drink only small amounts of liquids at a time.    Don t force yourself to eat, especially if you are having cramping, vomiting, or diarrhea. Don t eat large amounts at a time, even if you are  hungry.    If you eat, avoid fatty, greasy, spicy, or fried foods.    Don t eat dairy foods or drink milk if you have diarrhea. These can make diarrhea worse.  During the first 24 hours you can try:    Oral rehydration solutions. Do not use sports drinks. They have too much sugar and not enough electrolytes.    Soft drinks without caffeine    Ginger ale    Water (plain or flavored)    Decaf tea or coffee    Clear broth, consommé, or bouillon    Gelatin, popsicles, or frozen fruit juice bars  The second 24 hours, if you are feeling better, you can add:    Hot cereal, plain toast, bread, rolls, or crackers    Plain noodles, rice, mashed potatoes, chicken noodle soup, or rice soup    Unsweetened canned fruit (no pineapple)    Bananas  As you recover:    Limit fat intake to less than 15 grams per day. Don t eat margarine, butter, oils, mayonnaise, sauces, gravies, fried foods, peanut butter, meat, poultry, or fish.    Limit fiber. Don t eat raw or cooked vegetables, fresh fruits except bananas, or bran cereals.    Limit caffeine and chocolate.    Limit dairy.    Don t use spices or seasonings except salt.    Go back to your normal diet over time, as you feel better and your symptoms improve.    If the symptoms come back, go back to a simple diet or clear liquids.  Follow-up care  Follow up with your healthcare provider, or as advised. If a stool sample was taken or cultures were done, call the healthcare provider for the results as instructed.  Call 911  Call 911 if you have any of these symptoms:    Trouble breathing    Confusion    Extreme drowsiness or trouble walking    Loss of consciousness    Rapid heart rate    Chest pain    Stiff neck    Seizure  When to seek medical advice  Call your healthcare provider right away if any of these occur:    Abdominal pain that gets worse    Constant lower right abdominal pain    Continued vomiting and inability to keep liquids down    Diarrhea more than 5 times a day    Blood in  vomit or stool    Dark urine or no urine for 8 hours, dry mouth and tongue, tiredness, weakness, or dizziness    Drowsiness    New rash    You don t get better in 2 to 3 days    Fever of 100.4 F (38 C) or higher that doesn t get lower with medicine  Date Last Reviewed: 1/3/2016    0223-2065 [employer]. [address] [place] [address]. All rights reserved. This information is not intended as a substitute for professional medical care. Always follow your healthcare professional's instructions.            Your next 10 appointments already scheduled     Apr 02, 2018  9:00 AM CDT   LAB with  LAB   [facility] ([facility])    [address] [place] [address]  1st Floor  [facility] [address] [phone]           Please do not eat 10-12 hours before your appointment if you are coming in fasting for labs on lipids, cholesterol, or glucose (sugar). This does not apply to pregnant women. Water, hot tea and black coffee (with nothing added) are okay. Do not drink other fluids, diet soda or chew gum.            Apr 02, 2018 10:00 AM CDT   (Arrive by 9:45 AM)   New General Liver with Marcela Monge MD   [facility] ([facility])    [address] [place] [address]  [address] [place] [address] [phone]              24 Hour Appointment Hotline       To make an appointment at any [facility], call [phone] ([phone]). If you don't have a family doctor or clinic, we will help you find one. [facility] are conveniently located to serve the needs of you and your family.             Review of your medicines      Our records show that you are taking the medicines listed below. If these are incorrect, please call your family doctor or clinic.        Dose / Directions Last dose taken    amoxicillin-clavulanate 875-125 MG per tablet   Commonly known as:  AUGMENTIN   Dose:  1 tablet   Quantity:  12 tablet        Take 1  tablet by mouth 2 times daily for 6 days   Refills:  0        melatonin 3 MG tablet   Dose:  3 mg   Quantity:  30 tablet        Take 1 tablet (3 mg) by mouth nightly as needed for sleep   Refills:  3        multivitamin, therapeutic with minerals Tabs tablet   Dose:  1 tablet   Quantity:  30 each        Take 1 tablet by mouth daily   Refills:  0                Procedures and tests performed during your visit     CBC with platelets differential    Comprehensive metabolic panel    INR    Lipase    Peripheral IV catheter      Orders Needing Specimen Collection     Ordered          03/27/18 1844  Clostridium difficile toxin B PCR - ROUTINE, Prio: Routine, Needs to be Collected     Scheduled Task Status   03/27/18 1844 Collect Clostridium difficile toxin B PCR Open   Order Class:  PCU Collect                03/27/18 1844  Occult blood stool - STAT, Prio: STAT, Needs to be Collected     Scheduled Task Status   03/27/18 1844 Collect Occult blood stool Open   Order Class:  PCU Collect                03/27/18 1844  Enteric Bacteria and Virus Panel by ALEXUS Stool - STAT, Prio: STAT, Needs to be Collected     Scheduled Task Status   03/27/18 1844 Collect Enteric Bacteria and Virus Panel by ALEXUS Stool Open   Order Class:  PCU Collect                  Pending Results     No orders found from 3/25/2018 to 3/28/2018.            Pending Culture Results     No orders found from 3/25/2018 to 3/28/2018.            Pending Results Instructions     If you had any lab results that were not finalized at the time of your Discharge, you can call the ED Lab Result RN at [phone]. You will be contacted by this team for any positive Lab results or changes in treatment. The nurses are available 7 days a week from 10A to 6:30P.  You can leave a message 24 hours per day and they will return your call.        Test Results From Your Hospital Stay        3/27/2018  7:01 PM      Component Results     Component Value Ref Range & Units Status    WBC  12.2 (H) 4.0 - 11.0 10e9/L Final    RBC Count 3.16 (L) 4.4 - 5.9 10e12/L Final    Hemoglobin 11.9 (L) 13.3 - 17.7 g/dL Final    Hematocrit 35.1 (L) 40.0 - 53.0 % Final     (H) 78 - 100 fl Final    MCH 37.7 (H) 26.5 - 33.0 pg Final    MCHC 33.9 31.5 - 36.5 g/dL Final    RDW 16.7 (H) 10.0 - 15.0 % Final    Platelet Count 129 (L) 150 - 450 10e9/L Final    Diff Method Automated Method  Final    % Neutrophils 78.2 % Final    % Lymphocytes 8.1 % Final    % Monocytes 11.0 % Final    % Eosinophils 1.4 % Final    % Basophils 0.5 % Final    % Immature Granulocytes 0.8 % Final    Nucleated RBCs 0 0 /100 Final    Absolute Neutrophil 9.5 (H) 1.6 - 8.3 10e9/L Final    Absolute Lymphocytes 1.0 0.8 - 5.3 10e9/L Final    Absolute Monocytes 1.3 0.0 - 1.3 10e9/L Final    Absolute Eosinophils 0.2 0.0 - 0.7 10e9/L Final    Absolute Basophils 0.1 0.0 - 0.2 10e9/L Final    Abs Immature Granulocytes 0.1 0 - 0.4 10e9/L Final    Absolute Nucleated RBC 0.0  Final         3/27/2018  7:16 PM      Component Results     Component Value Ref Range & Units Status    INR 1.89 (H) 0.86 - 1.14 Final         3/27/2018  7:25 PM      Component Results     Component Value Ref Range & Units Status    Sodium 136 133 - 144 mmol/L Final    Potassium 4.1 3.4 - 5.3 mmol/L Final    Chloride 104 94 - 109 mmol/L Final    Carbon Dioxide 21 20 - 32 mmol/L Final    Anion Gap 11 3 - 14 mmol/L Final    Glucose 128 (H) 70 - 99 mg/dL Final    Urea Nitrogen 9 7 - 30 mg/dL Final    Creatinine 0.68 0.66 - 1.25 mg/dL Final    GFR Estimate >90 >60 mL/min/1.7m2 Final    Non  GFR Calc    GFR Estimate If Black >90 >60 mL/min/1.7m2 Final    African American GFR Calc    Calcium 8.3 (L) 8.5 - 10.1 mg/dL Final    Bilirubin Total 19.5 (HH) 0.2 - 1.3 mg/dL Final    Critical Value called to and read back by  DR. WES HOBSON IN ED @ 1924       Albumin 2.5 (L) 3.4 - 5.0 g/dL Final    Protein Total 8.0 6.8 - 8.8 g/dL Final    Alkaline Phosphatase 125 40 - 150 U/L  Final    ALT 77 (H) 0 - 70 U/L Final     (H) 0 - 45 U/L Final         3/27/2018  7:25 PM      Component Results     Component Value Ref Range & Units Status    Lipase 419 (H) 73 - 393 U/L Final                Clinical Quality Measure: Blood Pressure Screening     Your blood pressure was checked while you were in the emergency department today. The last reading we obtained was  BP: (!) 160/91 . Please read the guidelines below about what these numbers mean and what you should do about them.  If your systolic blood pressure (the top number) is less than 120 and your diastolic blood pressure (the bottom number) is less than 80, then your blood pressure is normal. There is nothing more that you need to do about it.  If your systolic blood pressure (the top number) is 120-139 or your diastolic blood pressure (the bottom number) is 80-89, your blood pressure may be higher than it should be. You should have your blood pressure rechecked within a year by a primary care provider.  If your systolic blood pressure (the top number) is 140 or greater or your diastolic blood pressure (the bottom number) is 90 or greater, you may have high blood pressure. High blood pressure is treatable, but if left untreated over time it can put you at risk for heart attack, stroke, or kidney failure. You should have your blood pressure rechecked by a primary care provider within the next 4 weeks.  If your provider in the emergency department today gave you specific instructions to follow-up with your doctor or provider even sooner than that, you should follow that instruction and not wait for up to 4 weeks for your follow-up visit.        Thank you for choosing [facility]       Thank you for choosing [facility] for your care. Our goal is always to provide you with excellent care. Hearing back from our patients is one way we can continue to improve our services. Please take a few minutes to complete the written survey that you may receive in  "the mail after you visit with us. Thank you!        [facility] [employer] Information     Q[employer]ru lets you send messages to your doctor, view your test results, renew your prescriptions, schedule appointments and more. To sign up, go to [email or web address] . Click on \"Log in\" on the left side of the screen, which will take you to the Welcome page. Then click on \"Sign up Now\" on the right side of the page.     You will be asked to enter the access code listed below, as well as some personal information. Please follow the directions to create your username and password.     Your access code is: [email or web address]  Expires: 2018 11:16 AM     Your access code will  in 90 days. If you need help or a new code, please call your [facility] clinic or [phone].        Care EveryWhere ID     This is your Care EveryWhere ID. This could be used by other organizations to access your [facility] medical records  [id number]        Equal Access to Services     DENNY OSUNA : Hadii milly maso Soevelin, waaxda luqadaha, qaybta kaalmada adeaury, loretta arora . So [facility] [phone].    ATENCIÓN: Si habla español, tiene a henriquez disposición servicios gratuitos de asistencia lingüística. Llame al [phone].    We comply with applicable federal civil rights laws and [place] laws. We do not discriminate on the basis of race, color, national origin, age, disability, sex, sexual orientation, or gender identity.            After Visit Summary       This is your record. Keep this with you and show to your community pharmacist(s) and doctor(s) at your next visit.                  "

## 2018-03-27 NOTE — ED AVS SNAPSHOT
Emergency Department    [address] [facility] [address]    Phone:  [phone]    Fax:  [phone]                                       Sylvain Pitts   MRN: [id number]    Department:   Emergency Department   Date of Visit:  3/27/2018           After Visit Summary Signature Page     I have received my discharge instructions, and my questions have been answered. I have discussed any challenges I see with this plan with the nurse or doctor.    ..........................................................................................................................................  Patient/Patient Representative Signature      ..........................................................................................................................................  Patient Representative Print Name and Relationship to Patient    ..................................................               ................................................  Date                                            Time    ..........................................................................................................................................  Reviewed by Signature/Title    ...................................................              ..............................................  Date                                                            Time

## 2018-03-27 NOTE — ED PROVIDER NOTES
"  History     Chief Complaint:  abdominal discomfort    HPI   Sylvain Pitts is a 38 year old male who was admitted about 2 weeks ago to [facility] for alcohol withdrawal and new alcoholic liver cirrhosis, and was discharged to detox. He developed a fever a few days later and was then hospitalized for pneumonia and small right pleural effusion with diagnostic thoracentesis under IR showing transudated with negative cultures. He was given ceftriaxone and then Zosyn in hospital and transitioned to Augmentin on discharge, with negative cultures. The patient states he had paracentesis with output of about 1L which was unremarkable per his report, though no note available to us mentions paracentesis (see discharged summary by Dr. Saleem Arrington on 3/24/18 for further details). Since discharge three days ago patient reports persistent jaundice and right-sided abdominal \"tightness.\" He denies true pain with this. He also notes some bilateral leg swelling since recent hospitalization, noting he had negative ultrasonography while in hospital. He continues to take his Augmentin and denies any fevers, chest pain, dyspnea, or significant cough. He also reports 4-5 episodes of non-bloody diarrhea over the past three days or so. No melena/hematochezia. Urine has been mildly darker but otherwise normal.  No nausea or vomiting, fevers, or any other acute symptoms.       Allergies:  No Known Allergies     Medications:    Augmentin      Past Medical History:    Alcohol abuse and cirrhosis with ascites  Pneumonia, currently being treated  Nephrolithiasis  Anxiety     Past Surgical History:    Head/neck surgery as child    Family History:    History reviewed. No pertinent family history.      Social History:  Former drinker.  Marital Status:  Single [1]  Light tobacco smoker.       Review of Systems   Constitutional: Negative.    Respiratory: Negative.    Cardiovascular: Positive for leg swelling. Negative for chest pain. " "  Gastrointestinal: Positive for abdominal distention and diarrhea. Negative for abdominal pain, blood in stool, nausea and vomiting.   Skin: Positive for color change.   All other systems reviewed and are negative.      Physical Exam     Patient Vitals for the past 24 hrs:   BP Temp Temp src Heart Rate Resp SpO2 Height Weight   03/27/18 2041 136/88 - - - - 94 % - -   03/27/18 1806 (!) 160/91 98.4  F (36.9  C) Oral 105 18 97 % 1.854 m (6' 1\") 101.2 kg (223 lb)        Physical Exam  SKIN:  Warm, dry.  Jaundiced.  No rash.  HEMATOLOGIC/IMMUNOLOGIC/LYMPHATIC:  No pallor.  BLE edema.  No petechia or purpura.  HENT:  Moist oral mucosa.  EYES:  Conjunctivae normal.  Scleral icterus.    CARDIOVASCULAR:  Regular rate and rhythm.  No murmur.  No rub.  RESPIRATORY:  No respiratory distress, breath sounds equal and normal.  GASTROINTESTINAL:  Distended nontender abdomen with active bowel sounds.  Ascitic fluid wave noted on percussion.  NEUROLOGIC:  Alert, conversant.  Oriented.  No asterixis.  PSYCHIATRIC:  Normal mood and affect.  No psychotic features.    Emergency Department Course   Laboratory:  Laboratory findings were communicated with the patient who voiced understanding of the findings.  CBC w/diff: WBC 12.2 (H), RBC 3.16 (L), Hgb 11.9 (L), Hct 35.1 (L), Plt 129 (L), ANC 9.5 (H), o/w WNL  CMP: Glu 128 (H), Ca 8.3 (L), t-bili 19.5 (HH), alb 2.5 (L), ALT 77 (H),  (H), o/w WNL (Cr 0.68)  INR: 1.89   Lipase: 419 (H)    Emergency Department Course:  Past medical records, nursing notes, and vitals reviewed.   1830: I performed an exam of the patient as documented above.   Enteric isolation precautions taken.    Peripheral IV access established. Blood drawn and sent. The above labs were obtained. Results above.  1923: Call from  for elevated total bilirubin of 19.5  2030: I rechecked patient, who is feeling better.   I discussed the treatment plan with the patient. He expressed understanding of this plan and " "consented to discharge. He will be discharged home with instructions for care and follow up. In addition, the patient will return to the emergency department if symptoms persist, worsen, if new symptoms arise or if there is any concern.  All questions were answered.      Impression & Plan    Medical Decision Making:  Sylvain Pitts is a 38 year old male who presents with concern for new diarrhea that has been present since his hospital stay recently. Also notes that his abdomen seems more tight or swollen but no pain. No fever noted and he has been checking his temperature every hour. I doubt he is suffering spontaneous bacterial peritonitis. Clearly he has ascites per history and examination. Diarrhea could be a function of the antibiotic but I considered C-diff colitis; however he could not provide a sample for testing while in the ED. Clinically he is well appearing and feels \"fine.\" His lab work of course shows multiple abnormalities given his liver disease but no significant changes from his recent comparisons. I thought it reasonable to have patient discharged but advised him to be assessed again pending his follow up in one week with a Gastroenterologist if he sang a fever or starts having abdominal pain, feeling confused, or his abdomen feels increasingly swollen. Advised Imodium for diarrhea.     Diagnosis:    ICD-10-CM    1. Alcoholic cirrhosis of liver with ascites (H) K70.31    2. Diarrhea, unspecified type R19.7        Disposition:   discharged to home    Scribe Disclosure:  I, Jose Carlos Heard, am serving as a scribe at 6:33 PM on 3/27/2018 to document services personally performed by Ross Douglass MD based on my observations and the provider's statements to me.    Jose Carlos Heard  3/27/2018   EMERGENCY DEPARTMENT      Ross Douglass MD  03/28/18 1135    "

## 2018-03-28 LAB
BACTERIA SPEC CULT: ABNORMAL
BACTERIA SPEC CULT: ABNORMAL
Lab: ABNORMAL
SPECIMEN SOURCE: ABNORMAL

## 2018-03-28 NOTE — DISCHARGE INSTRUCTIONS
?????????? ????? ??????? ?? ????????: ?????? ??????  ??? ????????? ???????  ?????? ??????  (liver cirrhosis). ?????? - ??? ??????????? ??????????? ??????, ??? ??????? ????? ?????? ??????????? ? ?????????? ???????? ??????. ????????? ??????? ?????? ????????? ???????? ?????? ? ??????????? ??????????. ?? ?????? ?????? ??????????? ???????? ??????? ????? ????? ?? ??????????? ??? ?? ???????? ???????? ????????????, ?????? ?????? ????????? ???????? ???????????? ??????????. ??????????? ????????????? ??????? ?????? ??????????, ???? ?? ?????-???? ?????????????? ?????????. ?????? - ??????????? ???????????, ?????? ??? ??????????????? ??????? ????????? ???????? ????? ?????????????????.   ???????? ????    ?? ???????????? ????????. ???? ?? ??????? ???? ???????, ?? ?????? ??????????? ???? ????? ? ????????? ??????? ??????.    ?????????? ? ????????? ?????? ?????????, ????? ??? ???????? ????????? ???????????.    ????????? ???????? ????? ???????? ?????????, ??????? ??????? ??? ??????? ????.    ????????? ?? ??????? ????.    ?????????? ???????????? ????????????????, ?????, ?????????? ????????? ? ???? ???????? ?????????????.    ?? ??????????? ??????? ????.    ??? ????????????? ?????????? ? ???? ???????? ?????? ????.    ?????????? ????????? ?????? ? ???????????? ? ??????????.    ???????? ???????? ?????, ????? ?????????? ????????? ??? ??????? ?????????. ??? ????? ??????? ?????????, ?????????? ???????? ?, ?, D ? ?????? (??????? ?1).    ?? ???????????? ??????? ? ?????? ?????????, ?????????????? ?????????? ?????.    ??????? ? ?????, ????? ????? ??? ??????? ?????????. ????????, ??? ???????? ?????????? ??????????? ???????????? ?????????.  ??????????? ????????? ??????????    ????????? ??????????? ??????? ? ???????????? ? ?????????? ???????????? ?????????.     ????? ???????? ?????  ?????????? ????????? ?????, ???? ? ??? ??????????? ?????-???? ?? ????????? ?????????:    ?????????, ????????, ?????? ????????    ????? (? ?????? ??? ???)    ??????????  ???? ??? ???? (???????)    ???    ????? ?????? ??? ???    ?????? ??? ????????? ????    ?? ???? ? ????????? ?????????? ??????    ?????????????, ?????????? ????????   Date Last Reviewed: 3/28/2014    1112-2914 [employer]. [address] [place] [address], [place] [address]. All rights reserved. This information is not intended as a substitute for professional medical care. Always follow your healthcare professional's instructions.      Discharge Instructions for Cirrhosis of the Liver  You have been diagnosed with cirrhosis of the liver. This is a long-term (chronic) problem. It occurs when liver tissue is destroyed and replaced by scar tissue. Causes of cirrhosis include:    Infection such as viral hepatitis    Chronic alcoholism    The body s immune system attacks healthy cells (autoimmune disorders)    Obesity    Medicine side effects    Genetic diseases  Sometimes the exact cause is unknown. You may not have any symptoms at first. Or your symptoms may be mild. But they usually get worse. Cirrhosis is likely to occur if you have a history of long-term alcohol abuse. Cirrhosis can t be cured. But it can be treated.   Home care  Alcohol    People with liver disease should not drink alcohol. If you stop drinking, you may feel better and live longer.    If you are a chronic alcohol user, you will have withdrawal symptoms. Talk with your healthcare provider for more information.      If alcohol is a problem, ask your provider about medicine that can help you quit drinking.      Find a local Alcoholics Anonymous support group online at www.aa.org.  Diet    Ask your provider what kind of diet you should follow. You may be asked to limit or not eat certain foods. Do not limit your protein intake.    Weigh yourself daily and keep a weight log. If you have a sudden change in weight, call your provider.    Cut back on salt:    Limit canned, dried, packaged, and fast foods.    Don t add salt to your food at the  table.    Season foods with herbs instead of salt when you cook.  Medicines, supplements, and vaccines    Take your medicines exactly as directed.    Talk to your provider before taking vitamins, over-the-counter medicines, or herbal supplements. Many herbal supplements may be poisonous (toxic) to the liver.    Avoid aspirin and other blood-thinning medicines.    Discuss vitamin supplements and deficiencies with your provider.    Ask your provider about getting vaccinations for viruses that can cause liver diseases.  Follow-up care  Follow up with your healthcare provider, or as advised. You will likely have the following tests:    Lab tests    Blood tests for liver cancer    Ultrasound of your liver every 6 months    Endoscopy to check for swollen veins (varices) in your digestive tract  When to call your provider  Call your healthcare provider right away if you have any of the following:    Fever of 100.4 F (38.0 C) or higher    Extreme tiredness (fatigue), weakness, or lack of appetite    Vomiting (with or without blood)    Yellowing of your skin or eyes (jaundice)    Itching    Swelling in your belly or legs    Black or tarry stools    Skin that bruises easily    Confusion or trouble thinking clearly   Date Last Reviewed: 8/1/2016 2000-2017 [employer]. [address] [place] [address]. All rights reserved. This information is not intended as a substitute for professional medical care. Always follow your healthcare professional's instructions.      Uncertain Causes of Diarrhea (Adult)    Diarrhea is when stools are loose and watery. This can be caused by:    Viral infections    Bacterial infections    Food poisoning    Parasites    Irritable bowel syndrome (IBS)    Inflammatory bowel diseases such as ulcerative colitis, Crohn's disease, and celiac disease    Food intolerance, such as to lactose, the sugar found in milk and milk products    Reaction to medicines like antibiotics,  laxatives, cancer drugs, and antacids  Along with diarrhea, you may also have:    Abdominal pain and cramping    Nausea and vomiting    Loss of bowel control    Fever and chills    Bloody stools  In some cases, antibiotics may help to treat diarrhea. You may have a stool sample test. This is done to see what is causing your diarrhea, and if antibiotics will help treat it. The results of a stool sample test may take up to 2 days. The healthcare provider may not give you antibiotics until he or she has the stool test results.  Diarrhea can cause dehydration. This is the loss of too much water and other fluids from the body. When this occurs, body fluid must be replaced. This can be done with oral rehydration solutions. Oral rehydration solutions are available at drugstores and grocery stores without a prescription.  Home care  Follow all instructions given by your healthcare provider. Rest at home for the next 24 hours, or until you feel better. Avoid caffeine, tobacco, and alcohol. These can make diarrhea, cramping, and pain worse.  If taking medicines:    Don t take over-the-counter diarrhea or nausea medicines unless your healthcare provider tells you to.    You may use acetaminophen or NSAID medicines like ibuprofen or naproxen to reduce pain and fever. Don t use these if you have chronic liver or kidney disease, or ever had a stomach ulcer or gastrointestinal bleeding. Don't use NSAID medicines if you are already taking one for another condition (like arthritis) or are on daily aspirin therapy (such as for heart disease or after a stroke). Talk with your healthcare provider first.    If antibiotics were prescribed, be sure you take them until they are finished. Don t stop taking them even when you feel better. Antibiotics must be taken as a full course.  To prevent the spread of illness:    Remember that washing with soap and water and using alcohol-based  is the best way to prevent the spread of  infection.    Clean the toilet after each use.    Wash your hands before eating.    Wash your hands before and after preparing food. Keep in mind that people with diarrhea or vomiting should not prepare food for others.    Wash your hands after using cutting boards, countertops, and knives that have been in contact with raw foods.    Wash and then peel fruits and vegetables.    Keep uncooked meats away from cooked and ready-to-eat foods.    Use a food thermometer when cooking. Cook poultry to at least 165 F (74 C). Cook ground meat (beef, veal, pork, lamb) to at least 160 F (71 C). Cook fresh beef, veal, lamb, and pork to at least 145 F (63 C).    Don t eat raw or undercooked eggs (poached or bhargavi side up), poultry, meat, or unpasteurized milk and juices.  Food and drinks  The main goal while treating vomiting or diarrhea is to prevent dehydration. This is done by taking small amounts of liquids often.    Keep in mind that liquids are more important than food right now.    Drink only small amounts of liquids at a time.    Don t force yourself to eat, especially if you are having cramping, vomiting, or diarrhea. Don t eat large amounts at a time, even if you are hungry.    If you eat, avoid fatty, greasy, spicy, or fried foods.    Don t eat dairy foods or drink milk if you have diarrhea. These can make diarrhea worse.  During the first 24 hours you can try:    Oral rehydration solutions. Do not use sports drinks. They have too much sugar and not enough electrolytes.    Soft drinks without caffeine    Ginger ale    Water (plain or flavored)    Decaf tea or coffee    Clear broth, consommé, or bouillon    Gelatin, popsicles, or frozen fruit juice bars  The second 24 hours, if you are feeling better, you can add:    Hot cereal, plain toast, bread, rolls, or crackers    Plain noodles, rice, mashed potatoes, chicken noodle soup, or rice soup    Unsweetened canned fruit (no pineapple)    Bananas  As you recover:    Limit  fat intake to less than 15 grams per day. Don t eat margarine, butter, oils, mayonnaise, sauces, gravies, fried foods, peanut butter, meat, poultry, or fish.    Limit fiber. Don t eat raw or cooked vegetables, fresh fruits except bananas, or bran cereals.    Limit caffeine and chocolate.    Limit dairy.    Don t use spices or seasonings except salt.    Go back to your normal diet over time, as you feel better and your symptoms improve.    If the symptoms come back, go back to a simple diet or clear liquids.  Follow-up care  Follow up with your healthcare provider, or as advised. If a stool sample was taken or cultures were done, call the healthcare provider for the results as instructed.  Call 911  Call 911 if you have any of these symptoms:    Trouble breathing    Confusion    Extreme drowsiness or trouble walking    Loss of consciousness    Rapid heart rate    Chest pain    Stiff neck    Seizure  When to seek medical advice  Call your healthcare provider right away if any of these occur:    Abdominal pain that gets worse    Constant lower right abdominal pain    Continued vomiting and inability to keep liquids down    Diarrhea more than 5 times a day    Blood in vomit or stool    Dark urine or no urine for 8 hours, dry mouth and tongue, tiredness, weakness, or dizziness    Drowsiness    New rash    You don t get better in 2 to 3 days    Fever of 100.4 F (38 C) or higher that doesn t get lower with medicine  Date Last Reviewed: 1/3/2016    9398-5071 [email or web address]. [address] [place] [address], [place] [address]. All rights reserved. This information is not intended as a substitute for professional medical care. Always follow your healthcare professional's instructions.

## 2018-03-30 DIAGNOSIS — K70.31 ALCOHOLIC CIRRHOSIS OF LIVER WITH ASCITES (H): Primary | ICD-10-CM

## 2018-04-02 ENCOUNTER — RADIANT APPOINTMENT (OUTPATIENT)
Dept: GENERAL RADIOLOGY | Facility: CLINIC | Age: 38
End: 2018-04-02
Payer: COMMERCIAL

## 2018-04-02 ENCOUNTER — ALLIED HEALTH/NURSE VISIT (OUTPATIENT)
Dept: GASTROENTEROLOGY | Facility: CLINIC | Age: 38
End: 2018-04-02

## 2018-04-02 ENCOUNTER — OFFICE VISIT (OUTPATIENT)
Dept: GASTROENTEROLOGY | Facility: CLINIC | Age: 38
End: 2018-04-02
Attending: INTERNAL MEDICINE
Payer: COMMERCIAL

## 2018-04-02 VITALS
BODY MASS INDEX: 29.93 KG/M2 | DIASTOLIC BLOOD PRESSURE: 89 MMHG | HEART RATE: 104 BPM | WEIGHT: 225.8 LBS | TEMPERATURE: 98.3 F | OXYGEN SATURATION: 95 % | RESPIRATION RATE: 18 BRPM | HEIGHT: 73 IN | SYSTOLIC BLOOD PRESSURE: 143 MMHG

## 2018-04-02 DIAGNOSIS — R60.0 LEG EDEMA, RIGHT: ICD-10-CM

## 2018-04-02 DIAGNOSIS — J18.9 COMMUNITY ACQUIRED PNEUMONIA OF RIGHT LOWER LOBE OF LUNG: Primary | ICD-10-CM

## 2018-04-02 DIAGNOSIS — K70.31 ALCOHOLIC CIRRHOSIS OF LIVER WITH ASCITES (H): ICD-10-CM

## 2018-04-02 DIAGNOSIS — K70.11 ALCOHOLIC HEPATITIS WITH ASCITES (H): ICD-10-CM

## 2018-04-02 DIAGNOSIS — J18.9 COMMUNITY ACQUIRED PNEUMONIA OF RIGHT LOWER LOBE OF LUNG: ICD-10-CM

## 2018-04-02 DIAGNOSIS — Z71.9 VISIT FOR COUNSELING: Primary | ICD-10-CM

## 2018-04-02 LAB
ALBUMIN SERPL-MCNC: 2.3 G/DL (ref 3.4–5)
ALP SERPL-CCNC: 147 U/L (ref 40–150)
ALT SERPL W P-5'-P-CCNC: 77 U/L (ref 0–70)
ANION GAP SERPL CALCULATED.3IONS-SCNC: 10 MMOL/L (ref 3–14)
AST SERPL W P-5'-P-CCNC: 148 U/L (ref 0–45)
BILIRUB DIRECT SERPL-MCNC: 10.2 MG/DL (ref 0–0.2)
BILIRUB SERPL-MCNC: 14.6 MG/DL (ref 0.2–1.3)
BUN SERPL-MCNC: 9 MG/DL (ref 7–30)
CALCIUM SERPL-MCNC: 8 MG/DL (ref 8.5–10.1)
CHLORIDE SERPL-SCNC: 106 MMOL/L (ref 94–109)
CO2 SERPL-SCNC: 20 MMOL/L (ref 20–32)
CREAT SERPL-MCNC: 0.59 MG/DL (ref 0.66–1.25)
ERYTHROCYTE [DISTWIDTH] IN BLOOD BY AUTOMATED COUNT: 16.2 % (ref 10–15)
GFR SERPL CREATININE-BSD FRML MDRD: >90 ML/MIN/1.7M2
GLUCOSE SERPL-MCNC: 133 MG/DL (ref 70–99)
HCT VFR BLD AUTO: 33.7 % (ref 40–53)
HGB BLD-MCNC: 11.8 G/DL (ref 13.3–17.7)
INR PPP: 2 (ref 0.86–1.14)
MCH RBC QN AUTO: 39.5 PG (ref 26.5–33)
MCHC RBC AUTO-ENTMCNC: 35 G/DL (ref 31.5–36.5)
MCV RBC AUTO: 113 FL (ref 78–100)
PLATELET # BLD AUTO: 114 10E9/L (ref 150–450)
POTASSIUM SERPL-SCNC: 3.4 MMOL/L (ref 3.4–5.3)
PROT SERPL-MCNC: 7.7 G/DL (ref 6.8–8.8)
RBC # BLD AUTO: 2.99 10E12/L (ref 4.4–5.9)
SODIUM SERPL-SCNC: 136 MMOL/L (ref 133–144)
WBC # BLD AUTO: 11 10E9/L (ref 4–11)

## 2018-04-02 PROCEDURE — 85027 COMPLETE CBC AUTOMATED: CPT | Performed by: INTERNAL MEDICINE

## 2018-04-02 PROCEDURE — 85610 PROTHROMBIN TIME: CPT | Performed by: INTERNAL MEDICINE

## 2018-04-02 PROCEDURE — 80048 BASIC METABOLIC PNL TOTAL CA: CPT | Performed by: INTERNAL MEDICINE

## 2018-04-02 PROCEDURE — 80076 HEPATIC FUNCTION PANEL: CPT | Performed by: INTERNAL MEDICINE

## 2018-04-02 PROCEDURE — G0463 HOSPITAL OUTPT CLINIC VISIT: HCPCS | Mod: ZF

## 2018-04-02 RX ORDER — SPIRONOLACTONE 50 MG/1
50 TABLET, FILM COATED ORAL DAILY
Qty: 30 TABLET | Refills: 1 | Status: SHIPPED | OUTPATIENT
Start: 2018-04-02 | End: 2018-05-07

## 2018-04-02 RX ORDER — METHION/INOS/CHOL BT/B COM/LIV 110MG-86MG
100 CAPSULE ORAL DAILY
Qty: 30 TABLET | Refills: 1 | Status: SHIPPED | OUTPATIENT
Start: 2018-04-02 | End: 2018-05-07

## 2018-04-02 RX ORDER — FUROSEMIDE 20 MG
20 TABLET ORAL DAILY
Qty: 30 TABLET | Refills: 1 | Status: SHIPPED | OUTPATIENT
Start: 2018-04-02 | End: 2018-05-07

## 2018-04-02 RX ORDER — FOLIC ACID 1 MG/1
1 TABLET ORAL DAILY
Qty: 30 TABLET | Refills: 1 | Status: SHIPPED | OUTPATIENT
Start: 2018-04-02 | End: 2018-05-07

## 2018-04-02 ASSESSMENT — PAIN SCALES - GENERAL: PAINLEVEL: NO PAIN (0)

## 2018-04-02 NOTE — MR AVS SNAPSHOT
After Visit Summary   4/2/2018    Sylvain Pitts    MRN: [id number]           Patient Information     Date Of Birth          1980        Visit Information        Provider Department      4/2/2018 10:00 AM Marcela Monge MD [facility] Hepatology        Today's Diagnoses     Community acquired pneumonia of right lower lobe of lung (H)    -  1    Alcoholic hepatitis with ascites        Leg edema, right           Follow-ups after your visit        Follow-up notes from your care team     Return in about 2 weeks (around 4/16/2018), or vel Nichols.      Your next 10 appointments already scheduled     Apr 02, 2018 11:15 AM CDT   Lab with UC LAB   [facility] Lab ([facility])    [address]  [address] [facility] [id number] [phone]            Apr 02, 2018 12:40 PM CDT   (Arrive by 12:25 PM)   XR CHEST 2 VIEWS with UCXR1   [facility] Xray ([facility])    [address] [place] [id number] [phone]           Please bring a list of your current medicines to your exam. (Include vitamins, minerals and over-thecounter medicines.) Leave your valuables at home.  Tell your doctor if there is a chance you may be pregnant.  You do not need to do anything special for this exam.            Apr 18, 2018  1:00 PM CDT   (Arrive by 12:45 PM)   New General Liver with Magdalena Damon PA-C   [facility] Hepatology ([facility])    [address] [place] [address]  [address]  [facility] [id number] [phone]              Future tests that were ordered for you today     Open Future Orders        Priority Expected Expires Ordered    Basic metabolic panel Routine 4/6/2018 4/16/2018 4/2/2018    Hepatic panel Routine 4/6/2018 4/16/2018 4/2/2018    CBC with platelets Routine 4/6/2018 4/16/2018 4/2/2018    INR Routine 4/6/2018 4/16/2018 4/2/2018    X-ray Chest 2 vws* Routine 4/2/2018  "2019    US Abdomen/Pelvis Duplex Complete Routine  2019    US Lower Extremity Venous Duplex Right Routine  2019            Who to contact     If you have questions or need follow up information about today's clinic visit or your schedule please contact [facility] HEPATOLOGY directly at [phone].  Normal or non-critical lab and imaging results will be communicated to you by Open[employer]Shart, letter or phone within 4 business days after the clinic has received the results. If you do not hear from us within 7 days, please contact the clinic through OpenDNShart or phone. If you have a critical or abnormal lab result, we will notify you by phone as soon as possible.  Submit refill requests through [employer] or call your pharmacy and they will forward the refill request to us. Please allow 3 business days for your refill to be completed.          Additional Information About Your Visit        [employer] Information     [employer] lets you send messages to your doctor, view your test results, renew your prescriptions, schedule appointments and more. To sign up, go to [email or web address] . Click on \"Log in\" on the left side of the screen, which will take you to the Welcome page. Then click on \"Sign up Now\" on the right side of the page.     You will be asked to enter the access code listed below, as well as some personal information. Please follow the directions to create your username and password.     Your access code is: [email or web address]  Expires: 2018 11:16 AM     Your access code will  in 90 days. If you need help or a new code, please call your [place] clinic or [phone].        Care EveryWhere ID     This is your Care EveryWhere ID. This could be used by other organizations to access your [place] medical records  [id number]        Your Vitals Were     Pulse Temperature Respirations Height Pulse Oximetry BMI (Body Mass Index)    104 98.3  F (36.8  C) (Oral) 18 1.854 m (6' 1\") 95% " 29.79 kg/m2       Blood Pressure from Last 3 Encounters:   04/02/18 143/89   03/27/18 136/88   03/24/18 112/56    Weight from Last 3 Encounters:   04/02/18 102.4 kg (225 lb 12.8 oz)   03/27/18 101.2 kg (223 lb)   03/18/18 102.5 kg (226 lb)                 Today's Medication Changes          These changes are accurate as of 4/2/18 10:43 AM.  If you have any questions, ask your nurse or doctor.               Start taking these medicines.        Dose/Directions    B-1 100 MG Tabs   Used for:  Community acquired pneumonia of right lower lobe of lung (H), Alcoholic hepatitis with ascites, Leg edema, right   Started by:  Marcela Monge MD        Dose:  100 mg   Take 100 mg by mouth daily   Quantity:  30 tablet   Refills:  1       folic acid 1 MG tablet   Commonly known as:  FOLVITE   Used for:  Community acquired pneumonia of right lower lobe of lung (H), Alcoholic hepatitis with ascites, Leg edema, right   Started by:  Marcela Monge MD        Dose:  1 mg   Take 1 tablet (1 mg) by mouth daily   Quantity:  30 tablet   Refills:  1       furosemide 20 MG tablet   Commonly known as:  LASIX   Used for:  Community acquired pneumonia of right lower lobe of lung (H), Alcoholic hepatitis with ascites, Leg edema, right   Started by:  Marcela Monge MD        Dose:  20 mg   Take 1 tablet (20 mg) by mouth daily   Quantity:  30 tablet   Refills:  1       spironolactone 50 MG tablet   Commonly known as:  ALDACTONE   Used for:  Community acquired pneumonia of right lower lobe of lung (H), Alcoholic hepatitis with ascites, Leg edema, right   Started by:  Marcela Monge MD        Dose:  50 mg   Take 1 tablet (50 mg) by mouth daily   Quantity:  30 tablet   Refills:  1            Where to get your medicines      These medications were sent to [place], [place] - [address] [facility] [address]  [address] [facility] [address], [facility] [address]    Hours:   TRANSPLANT PHONE NUMBER [phone] Phone:  [phone]     B-1 100 MG Tabs    folic acid 1 MG tablet    furosemide 20 MG tablet    spironolactone 50 MG tablet                Primary Care Provider Office Phone # Fax #    [facility] [phone] [address] [address]        Equal Access to Services     NANI OSUNA : Hadii aad ku hadasho Soomaali, waaxda luqadaha, qaybta kaalmada adeegyada, waxay idiin hayaan adeeg khjessish la'aan ah. So [facility] [phone].    ATENCIÓN: Si habla español, tiene a henriquez disposición servicios gratuitos de asistencia lingüística. [facility] [phone].    We comply with applicable federal civil rights laws and [place] laws. We do not discriminate on the basis of race, color, national origin, age, disability, sex, sexual orientation, or gender identity.            Thank you!     Thank you for choosing [facility] HEPATOLOGY  for your care. Our goal is always to provide you with excellent care. Hearing back from our patients is one way we can continue to improve our services. Please take a few minutes to complete the written survey that you may receive in the mail after your visit with us. Thank you!             Your Updated Medication List - Protect others around you: Learn how to safely use, store and throw away your medicines at www.disposemymeds.org.          This list is accurate as of 4/2/18 10:43 AM.  Always use your most recent med list.                   Brand Name Dispense Instructions for use Diagnosis    B-1 100 MG Tabs     30 tablet    Take 100 mg by mouth daily    Community acquired pneumonia of right lower lobe of lung (H), Alcoholic hepatitis with ascites, Leg edema, right       folic acid 1 MG tablet    FOLVITE    30 tablet    Take 1 tablet (1 mg) by mouth daily    Community acquired pneumonia of right lower lobe of lung (H), Alcoholic hepatitis with ascites, Leg edema, right       furosemide 20 MG tablet     LASIX    30 tablet    Take 1 tablet (20 mg) by mouth daily    Community acquired pneumonia of right lower lobe of lung (H), Alcoholic hepatitis with ascites, Leg edema, right       melatonin 3 MG tablet     30 tablet    Take 1 tablet (3 mg) by mouth nightly as needed for sleep    Sleep difficulties       spironolactone 50 MG tablet    ALDACTONE    30 tablet    Take 1 tablet (50 mg) by mouth daily    Community acquired pneumonia of right lower lobe of lung (H), Alcoholic hepatitis with ascites, Leg edema, right

## 2018-04-02 NOTE — LETTER
"4/2/2018       RE: Sylvain Pitts  [address]  [facility] [address]     Dear Colleague,    Thank you for referring your patient, Sylvain Pitts, to the [facility] HEPATOLOGY at [facility]. Please see a copy of my visit note below.    A/P  38 year oldmale with ETOH cirrhosis, alcoholic hepatitis. Sober for 2 weeks. Still healing. Still quite ill.  1. Pneumonia: CXR today  2. Question of ascites and \"abdominal pressure\". I do think he has ascites. Start lasix 20 mg daily and spironolactone 50 mg daily. BMP in 1 week. repeat US ordered  3. RLE swelling repeat US. It is marked. Prev US was negative.  4. Alcoholism, sober for 2 weeks  5. Thiamine and folate ordered. Continue MVI  6. RTC in 2 weeks to see Magdalena Damon  7. Completed [place] Disability form at his request and with assistance of TRISTA Funes    Complicated by      Marcela Monge MD  Hepatology/Liver Transplant  Medical Director, Liver Transplantation  [facility]  ==================================================================      Subjective: 38 year old male with ETOH liver disease. He was hospitalized from 3/14 to 3/16. Seen by Sherry Aroryo NP. Diagnosis was alcohol dependence, anxiety, need for detox and alcoholic hepatitis/decompensated cirrhosis with ascites. He did not require paracentesis at that time. MELD was 22. He was readmitted 3/18-3/24 with fever and diarrhea, and diagnoses with pneumonia. He underwent thoracentesis and saw ID. Place on Zosyn then transitioned to Augmentin with antibiotics to continue with abx for 6 more days, which he finished.No fevers.    Was in the ED 3/27 for abdominal \"tightness\". He is not on diuretics. Last US 3/18/18 small to moderate ascites.  Had a paracentesis of 1 L on 3/21: no SBP.    Sober since March 13. Was drinking 15 hosts vodka/whiskey daily for 7 months.      Kidney function has been fine, as has Na. Tbili peak was 20, now 14. " "INR 2    Today he tells me he feels fullness in the R side, feels short of breath when he is going up stairs and has a swollen right leg. US LE of right leg wa done 3/24/18.    Ascites:  yes   Paracentesis required once   Diuretics not on any   Low sodium diet: trying to eat only fruit and vegetable    HE:  no    Last EGD none  Last HCC screening US    Current Outpatient Prescriptions   Medication     melatonin 3 MG tablet     multivitamin, therapeutic with minerals (THERA-VIT-M) TABS tablet     No current facility-administered medications for this visit.        /89 (BP Location: Right arm, Patient Position: Sitting, Cuff Size: Adult Regular)  Pulse 104  Temp 98.3  F (36.8  C) (Oral)  Resp 18  Ht 1.854 m (6' 1\")  Wt 102.4 kg (225 lb 12.8 oz)  SpO2 95%  BMI 29.79 kg/m2  Constitutional: alert and no distress.   Head: Normocephalic. No masses, lesions, tenderness or abnormalities  Neck: Neck supple. No adenopathy. Thyroid symmetric, normal size  ENT: ENT exam normal, no neck nodes or sinus tenderness. No oral lesions  Cardiovascular: negative, No lifts, heaves, or thrills. RRR. No murmurs, clicks gallops or rub  Respiratory: negative, Good diaphragmatic excursion. Decreased in RLL  Gastrointestinal: Abdomen soft, non-tender. BS normal.  No masses, organomegaly  Skin: no suspicious lesions or rashes.Jaundiced, spider angiomata present  Neurologic: Gait normal. Reflexes normal and symmetric. Sensation grossly WNL.  Psychiatric:  Appropriate, well groomed.  Hematologic/Lymphatic/Immunologic: Normal cervical and supraclavicular  lymph nodes  LE: R leg significantly swollen. L leg normal      Again, thank you for allowing me to participate in the care of your patient.      Sincerely,    Marcela Monge MD      "

## 2018-04-02 NOTE — MR AVS SNAPSHOT
"              After Visit Summary   4/2/2018    Sylvain Pitts    MRN: [id number]           Patient Information     Date Of Birth          1980        Visit Information        Provider Department      4/2/2018 4:53 PM Shantel Schreiber LICSW [facility] Hepatology        Today's Diagnoses     Visit for counseling    -  1       Follow-ups after your visit        Your next 10 appointments already scheduled     Apr 18, 2018  1:00 PM CDT   (Arrive by 12:45 PM)   New General Liver with Magdalena Damon PA-C   [facility] Hepatology ([facility])    [address] [place] [address]  [address]  [facility] [address] [phone]              Who to contact     If you have questions or need follow up information about today's clinic visit or your schedule please contact [facility] HEPATOLOGY directly at [phone].  Normal or non-critical lab and imaging results will be communicated to you by MyChart, letter or phone within 4 business days after the clinic has received the results. If you do not hear from us within 7 days, please contact the clinic through MyChart or phone. If you have a critical or abnormal lab result, we will notify you by phone as soon as possible.  Submit refill requests through [employer] or call your pharmacy and they will forward the refill request to us. Please allow 3 business days for your refill to be completed.          Additional Information About Your Visit        MyChart Information     [employer] lets you send messages to your doctor, view your test results, renew your prescriptions, schedule appointments and more. To sign up, go to [email or web address] . Click on \"Log in\" on the left side of the screen, which will take you to the Welcome page. Then click on \"Sign up Now\" on the right side of the page.     You will be asked to enter the access code listed below, as well as some personal information. Please follow the directions to create your username and password.   "   Your access code is: [email or web address]  Expires: 2018 11:16 AM     Your access code will  in 90 days. If you need help or a new code, please call your [place] clinic or [phone].        Care EveryWhere ID     This is your Care EveryWhere ID. This could be used by other organizations to access your [place] medical records  [id number]         Blood Pressure from Last 3 Encounters:   18 143/89   18 136/88   18 112/56    Weight from Last 3 Encounters:   18 102.4 kg (225 lb 12.8 oz)   18 101.2 kg (223 lb)   18 102.5 kg (226 lb)              Today, you had the following     No orders found for display         Today's Medication Changes          These changes are accurate as of 18 11:59 PM.  If you have any questions, ask your nurse or doctor.               Start taking these medicines.        Dose/Directions    B-1 100 MG Tabs   Used for:  Community acquired pneumonia of right lower lobe of lung (H), Alcoholic hepatitis with ascites, Leg edema, right   Started by:  Marcela Monge MD        Dose:  100 mg   Take 100 mg by mouth daily   Quantity:  30 tablet   Refills:  1       folic acid 1 MG tablet   Commonly known as:  FOLVITE   Used for:  Community acquired pneumonia of right lower lobe of lung (H), Alcoholic hepatitis with ascites, Leg edema, right   Started by:  Marcela Monge MD        Dose:  1 mg   Take 1 tablet (1 mg) by mouth daily   Quantity:  30 tablet   Refills:  1       furosemide 20 MG tablet   Commonly known as:  LASIX   Used for:  Community acquired pneumonia of right lower lobe of lung (H), Alcoholic hepatitis with ascites, Leg edema, right   Started by:  Marcela Monge MD        Dose:  20 mg   Take 1 tablet (20 mg) by mouth daily   Quantity:  30 tablet   Refills:  1       spironolactone 50 MG tablet   Commonly known as:  ALDACTONE   Used for:  Community acquired pneumonia of right lower lobe of lung (H),  Alcoholic hepatitis with ascites, Leg edema, right   Started by:  Marcela Monge MD        Dose:  50 mg   Take 1 tablet (50 mg) by mouth daily   Quantity:  30 tablet   Refills:  1            Where to get your medicines      These medications were sent to [facility] - [place], [place] - [address]  [address], [facility] [address]    Hours:  TRANSPLANT PHONE NUMBER [phone] Phone:  [phone]     B-1 100 MG Tabs    folic acid 1 MG tablet    furosemide 20 MG tablet    spironolactone 50 MG tablet                Primary Care Provider Office Phone # Fax #    [facility] [phone] [address] [facility] [address]        Equal Access to Services     NANI OSUNA : Hadii milly daily hadasho Soomaali, waaxda luqadaha, qaybta kaalmada adeegyada, loretta gibson. So [facility] [phone].    ATENCIÓN: Si habla español, tiene a henriquez disposición servicios gratuitos de asistencia lingüística. Llame al [phone].    We comply with applicable federal civil rights laws and [place] laws. We do not discriminate on the basis of race, color, national origin, age, disability, sex, sexual orientation, or gender identity.            Thank you!     Thank you for choosing [facility] HEPATOLOGY  for your care. Our goal is always to provide you with excellent care. Hearing back from our patients is one way we can continue to improve our services. Please take a few minutes to complete the written survey that you may receive in the mail after your visit with us. Thank you!             Your Updated Medication List - Protect others around you: Learn how to safely use, store and throw away your medicines at www.disposemymeds.org.          This list is accurate as of 4/2/18 11:59 PM.  Always use your most recent med list.                   Brand Name Dispense Instructions for use Diagnosis     B-1 100 MG Tabs     30 tablet    Take 100 mg by mouth daily    Community acquired pneumonia of right lower lobe of lung (H), Alcoholic hepatitis with ascites, Leg edema, right       folic acid 1 MG tablet    FOLVITE    30 tablet    Take 1 tablet (1 mg) by mouth daily    Community acquired pneumonia of right lower lobe of lung (H), Alcoholic hepatitis with ascites, Leg edema, right       furosemide 20 MG tablet    LASIX    30 tablet    Take 1 tablet (20 mg) by mouth daily    Community acquired pneumonia of right lower lobe of lung (H), Alcoholic hepatitis with ascites, Leg edema, right       melatonin 3 MG tablet     30 tablet    Take 1 tablet (3 mg) by mouth nightly as needed for sleep    Sleep difficulties       spironolactone 50 MG tablet    ALDACTONE    30 tablet    Take 1 tablet (50 mg) by mouth daily    Community acquired pneumonia of right lower lobe of lung (H), Alcoholic hepatitis with ascites, Leg edema, right
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
## 2018-04-02 NOTE — NURSING NOTE
"Chief Complaint   Patient presents with     Consult     Alcohol induced cirrhosis       Initial /89 (BP Location: Right arm, Patient Position: Sitting, Cuff Size: Adult Regular)  Pulse 104  Temp 98.3  F (36.8  C) (Oral)  Resp 18  Ht 1.854 m (6' 1\")  Wt 102.4 kg (225 lb 12.8 oz)  SpO2 95%  BMI 29.79 kg/m2 Estimated body mass index is 29.79 kg/(m^2) as calculated from the following:    Height as of this encounter: 1.854 m (6' 1\").    Weight as of this encounter: 102.4 kg (225 lb 12.8 oz).  Medication Reconciliation: complete     Jimena Nash [facility]  4/2/2018 10:02 AM        "

## 2018-04-02 NOTE — PROGRESS NOTES
"A/P  38 year oldmale with ETOH cirrhosis, alcoholic hepatitis. Sober for 2 weeks. Still healing. Still quite ill.  1. Pneumonia: CXR today  2. Question of ascites and \"abdominal pressure\". I do think he has ascites. Start lasix 20 mg daily and spironolactone 50 mg daily. BMP in 1 week. repeat US ordered  3. RLE swelling repeat US. It is marked. Prev US was negative.  4. Alcoholism, sober for 2 weeks  5. Thiamine and folate ordered. Continue MVI  6. RTC in 2 weeks to see Magdalena Damon  7. Completed [place] Disability form at his request and with assistance of TRISTA Funes    Complicated by      Marcela Monge MD  Hepatology/Liver Transplant  Medical Director, Liver Transplantation  [facility]  ==================================================================      Subjective: 38 year old male with ETOH liver disease. He was hospitalized from 3/14 to 3/16. Seen by Sherry Arroyo NP. Diagnosis was alcohol dependence, anxiety, need for detox and alcoholic hepatitis/decompensated cirrhosis with ascites. He did not require paracentesis at that time. MELD was 22. He was readmitted 3/18-3/24 with fever and diarrhea, and diagnoses with pneumonia. He underwent thoracentesis and saw ID. Place on Zosyn then transitioned to Augmentin with antibiotics to continue with abx for 6 more days, which he finished.No fevers.    Was in the ED 3/27 for abdominal \"tightness\". He is not on diuretics. Last US 3/18/18 small to moderate ascites.  Had a paracentesis of 1 L on 3/21: no SBP.    Sober since March 13. Was drinking 15 hosts vodka/whiskey daily for 7 months.      Kidney function has been fine, as has Na. Tbili peak was 20, now 14. INR 2    Today he tells me he feels fullness in the R side, feels short of breath when he is going up stairs and has a swollen right leg. US LE of right leg wa done 3/24/18.    Ascites:  yes   Paracentesis required once   Diuretics not on any   Low sodium diet: trying to eat only fruit and " "vegetable    HE:  no    Last EGD none  Last HCC screening US    Current Outpatient Prescriptions   Medication     melatonin 3 MG tablet     multivitamin, therapeutic with minerals (THERA-VIT-M) TABS tablet     No current facility-administered medications for this visit.        /89 (BP Location: Right arm, Patient Position: Sitting, Cuff Size: Adult Regular)  Pulse 104  Temp 98.3  F (36.8  C) (Oral)  Resp 18  Ht 1.854 m (6' 1\")  Wt 102.4 kg (225 lb 12.8 oz)  SpO2 95%  BMI 29.79 kg/m2  Constitutional: alert and no distress.   Head: Normocephalic. No masses, lesions, tenderness or abnormalities  Neck: Neck supple. No adenopathy. Thyroid symmetric, normal size  ENT: ENT exam normal, no neck nodes or sinus tenderness. No oral lesions  Cardiovascular: negative, No lifts, heaves, or thrills. RRR. No murmurs, clicks gallops or rub  Respiratory: negative, Good diaphragmatic excursion. Decreased in RLL  Gastrointestinal: Abdomen soft, non-tender. BS normal.  No masses, organomegaly  Skin: no suspicious lesions or rashes.Jaundiced, spider angiomata present  Neurologic: Gait normal. Reflexes normal and symmetric. Sensation grossly WNL.  Psychiatric:  Appropriate, well groomed.  Hematologic/Lymphatic/Immunologic: Normal cervical and supraclavicular  lymph nodes  LE: R leg significantly swollen. L leg normal    "

## 2018-04-03 ENCOUNTER — RADIANT APPOINTMENT (OUTPATIENT)
Dept: ULTRASOUND IMAGING | Facility: CLINIC | Age: 38
End: 2018-04-03
Attending: INTERNAL MEDICINE
Payer: COMMERCIAL

## 2018-04-03 DIAGNOSIS — R60.0 LEG EDEMA, RIGHT: ICD-10-CM

## 2018-04-03 DIAGNOSIS — J18.9 COMMUNITY ACQUIRED PNEUMONIA OF RIGHT LOWER LOBE OF LUNG: ICD-10-CM

## 2018-04-03 DIAGNOSIS — K70.11 ALCOHOLIC HEPATITIS WITH ASCITES (H): ICD-10-CM

## 2018-04-05 ENCOUNTER — TELEPHONE (OUTPATIENT)
Dept: GASTROENTEROLOGY | Facility: CLINIC | Age: 38
End: 2018-04-05

## 2018-04-05 NOTE — TELEPHONE ENCOUNTER
Pt LVM at 10am, requesting another callback from THERESA Buck, regarding getting flu/fluid removed out of his body (message was hard to understand).    Kateryna callback pt at [phone]

## 2018-04-05 NOTE — TELEPHONE ENCOUNTER
Pt returned my call. Since starting the diuretics pt has noticed decreased in L/E. Pt has noticed increased urination since starting the diuretics.  Pt c/o of SOB with excursion and states he doesn't know if it's worse or better. Pt does have some abdominal distention and hasn't worsened. Pt sleeps about 7 hours per night. No temp. Pt plas to have blood drawn at a clinic near his home before the next clinic visit with Carline MARTINEZ.
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
## 2018-04-10 ENCOUNTER — TELEPHONE (OUTPATIENT)
Dept: GASTROENTEROLOGY | Facility: CLINIC | Age: 38
End: 2018-04-10

## 2018-04-10 NOTE — TELEPHONE ENCOUNTER
Inga with [employer], called on behalf of Dr Ruthy Cuellar, requesting to speak with Dr Monge, regarding  what medications for anxiety are safe for this pt to use. Pt is requesting lorazepam..    Please callback Inga at [phone], or Dr Monge can page Dr Cuellar at [phone].

## 2018-04-11 ENCOUNTER — TELEPHONE (OUTPATIENT)
Dept: GASTROENTEROLOGY | Facility: CLINIC | Age: 38
End: 2018-04-11

## 2018-04-11 NOTE — TELEPHONE ENCOUNTER
Writer spoke to Dr. Cuellar, pt's PCP and wanted you to know that she started pt on zoloft 12.5mg daily and hydroxyzine 10mg at HS PRN. The doctor doesn't seem to think it would be a problem with his liver functions but wanted you to know. Ok to take?

## 2018-04-16 ENCOUNTER — TELEPHONE (OUTPATIENT)
Dept: GASTROENTEROLOGY | Facility: CLINIC | Age: 38
End: 2018-04-16

## 2018-04-16 DIAGNOSIS — K70.11 ALCOHOLIC HEPATITIS WITH ASCITES (H): ICD-10-CM

## 2018-04-16 DIAGNOSIS — R60.0 LEG EDEMA, RIGHT: ICD-10-CM

## 2018-04-16 DIAGNOSIS — J18.9 COMMUNITY ACQUIRED PNEUMONIA OF RIGHT LOWER LOBE OF LUNG: ICD-10-CM

## 2018-04-16 LAB
ALBUMIN SERPL-MCNC: 2.4 G/DL (ref 3.4–5)
ALP SERPL-CCNC: 122 U/L (ref 40–150)
ALT SERPL W P-5'-P-CCNC: 72 U/L (ref 0–70)
ANION GAP SERPL CALCULATED.3IONS-SCNC: 12 MMOL/L (ref 3–14)
AST SERPL W P-5'-P-CCNC: 119 U/L (ref 0–45)
BILIRUB DIRECT SERPL-MCNC: 9.6 MG/DL (ref 0–0.2)
BILIRUB SERPL-MCNC: 15.4 MG/DL (ref 0.2–1.3)
BUN SERPL-MCNC: 9 MG/DL (ref 7–30)
CALCIUM SERPL-MCNC: 8.1 MG/DL (ref 8.5–10.1)
CHLORIDE SERPL-SCNC: 103 MMOL/L (ref 94–109)
CO2 SERPL-SCNC: 20 MMOL/L (ref 20–32)
CREAT SERPL-MCNC: 0.61 MG/DL (ref 0.66–1.25)
ERYTHROCYTE [DISTWIDTH] IN BLOOD BY AUTOMATED COUNT: 14.9 % (ref 10–15)
GFR SERPL CREATININE-BSD FRML MDRD: >90 ML/MIN/1.7M2
GLUCOSE SERPL-MCNC: 104 MG/DL (ref 70–99)
HCT VFR BLD AUTO: 34 % (ref 40–53)
HGB BLD-MCNC: 11.5 G/DL (ref 13.3–17.7)
INR PPP: 2.15 (ref 0.86–1.14)
MCH RBC QN AUTO: 38.7 PG (ref 26.5–33)
MCHC RBC AUTO-ENTMCNC: 33.8 G/DL (ref 31.5–36.5)
MCV RBC AUTO: 115 FL (ref 78–100)
PLATELET # BLD AUTO: 88 10E9/L (ref 150–450)
POTASSIUM SERPL-SCNC: 4 MMOL/L (ref 3.4–5.3)
PROT SERPL-MCNC: 7.7 G/DL (ref 6.8–8.8)
RBC # BLD AUTO: 2.97 10E12/L (ref 4.4–5.9)
SODIUM SERPL-SCNC: 134 MMOL/L (ref 133–144)
WBC # BLD AUTO: 7.7 10E9/L (ref 4–11)

## 2018-04-16 NOTE — TELEPHONE ENCOUNTER
Writer discussed results with KYLE Walker. Direct bilirubin is trending down which is a good sign of recovering liver function. Total bilirubin is relatively the same as before. Will discuss results in clinic with patient on Wednesday.

## 2018-04-16 NOTE — TELEPHONE ENCOUNTER
4/16/2018  9:22 AM      DATE:  4/16/2018   TIME OF RECEIPT FROM LAB:  0922  LAB TEST:  Total Bilirubin   LAB VALUE:  15.4  RESULTS GIVEN WITH READ-BACK TO (PROVIDER):  Messaged via Epic high alert to provider Dr. Saleem Mcmullen and nurses Wally BUSTOS And Cielo WALKER who work with providers. Patient has an appointment in clinic 4/18/18 with Magdalena.   TIME LAB VALUE REPORTED TO PROVIDER:   0923        Cindy Ramey RN, BSN, PHN  [facility]   RN Care Coordinator Hepatology Specialty Clinic/Program

## 2018-04-18 ENCOUNTER — OFFICE VISIT (OUTPATIENT)
Dept: GASTROENTEROLOGY | Facility: CLINIC | Age: 38
End: 2018-04-18
Attending: PHYSICIAN ASSISTANT
Payer: COMMERCIAL

## 2018-04-18 ENCOUNTER — RADIANT APPOINTMENT (OUTPATIENT)
Dept: GENERAL RADIOLOGY | Facility: CLINIC | Age: 38
End: 2018-04-18
Attending: PHYSICIAN ASSISTANT
Payer: COMMERCIAL

## 2018-04-18 VITALS
HEART RATE: 109 BPM | HEIGHT: 73 IN | DIASTOLIC BLOOD PRESSURE: 89 MMHG | OXYGEN SATURATION: 95 % | BODY MASS INDEX: 26.97 KG/M2 | TEMPERATURE: 98.3 F | WEIGHT: 203.5 LBS | SYSTOLIC BLOOD PRESSURE: 148 MMHG

## 2018-04-18 DIAGNOSIS — K70.31 ALCOHOLIC CIRRHOSIS OF LIVER WITH ASCITES (H): Primary | ICD-10-CM

## 2018-04-18 DIAGNOSIS — K70.31 ALCOHOLIC CIRRHOSIS OF LIVER WITH ASCITES (H): ICD-10-CM

## 2018-04-18 LAB
FUNGUS SPEC CULT: NORMAL
SPECIMEN SOURCE: NORMAL

## 2018-04-18 PROCEDURE — G0463 HOSPITAL OUTPT CLINIC VISIT: HCPCS | Mod: ZF

## 2018-04-18 ASSESSMENT — PAIN SCALES - GENERAL: PAINLEVEL: NO PAIN (0)

## 2018-04-18 NOTE — LETTER
4/18/2018      RE: Sylvain Pitts  [address]  [place] [address]         Hepatology Clinic note  Sylvain Pitts   Date of Birth 1980  Date of Service 4/18/2018           Assessment/plan:   Sylvain Pitts is a 38 year old male presenting for follow of alcohol hepatitis/cirrhosis. He has been sober for about 3 weeks. His THO has resolved and ascites has improved after starting diuretics. Last paracentesis was 3/21. He does note some persistent difficulty breathing with exertion. His liver function remains relatively stable, current MELD is 27, slight rise in his bilirubin. We discussed the importance of abstinence and focusing on good nutrition for healing. He is still ill with expected slow recovery.  He will need an EGD in the near future to screen for esophageal varices, which was not discussed today.     1. Persistent shortness of breath:   -Repeat chest x-ray to rule out pneumonia / pleural effusion     2. Ascites/lower extremity edema:   - Continue Lasix 20 mg and spironolactone 50 mg daily  - Continue 2 gm sodium/high protein diet     3. Continue abstinence from alcohol  - Continue multivitamin, thiamine and folate    4. Repeat labs in 2 weeks    5.  Return to clinic in 3 weeks    Magdalena Damon PA-C   [facility] Hepatology     -----------------------------------------------------       HPI:   Sylvain Pitts is a 38 year old male with alcohol hepatitis complicated by ascites and lower extremity edema. No HE Patient was last seen in clinic on 4/2/2018 by Dr. Monge.    Patient states that he feels like he is doing better since last visit.  He still notes that he has difficulty breathing when going up stairs.  His coughing has improved, but he may have coughing spells every 3 days. He states he has lost about 15 pounds since extra fluid has been removed. He states swelling in his legs has resolved.  He continues to feel fatigued.  He is checking his temperature  "regularly and denies any fevers. He states his appetite has improved and he is eating numerous small meals throughout the day. His last EtOH use was the end of March. He otherwise denies any history of melena, hematochezia. He is having regular bowel movements.    Patient recent hospitalization 3/14 - 3/16, for alcohol dependence, detox, alcoholic hepatitis/cirrhosis. Readmitted on 3/18-3/22 with pneumonia, required diagnostic thoracentesis. He has a paracentesis on 3/21 with 1 L removed.       Medical hx Surgical hx   Past Medical History:   Diagnosis Date     Anxiety      Cirrhosis (H)      Kidney stones      Pneumonia     Past Surgical History:   Procedure Laterality Date     HEAD & NECK SURGERY      head surgery when he was 14 years old.                 Medications:     Current Outpatient Prescriptions   Medication     folic acid (FOLVITE) 1 MG tablet     furosemide (LASIX) 20 MG tablet     spironolactone (ALDACTONE) 50 MG tablet     Thiamine HCl (B-1) 100 MG TABS     No current facility-administered medications for this visit.             Allergies:   No Known Allergies         Social History:     Social History     Social History     Marital status: Single     Spouse name: N/A     Number of children: N/A     Years of education: N/A     Occupational History     Not on file.     Social History Main Topics     Smoking status: Former Smoker     Smokeless tobacco: Never Used     Alcohol use Yes      Comment: every day, except stopped drinking on 3/13/18     Drug use: No     Sexual activity: Not on file     Other Topics Concern     Not on file     Social History Narrative            Family History:   History reviewed. No pertinent family history.         Review of Systems:   See HPI         Physical Exam:   VS:  /89  Pulse 109  Temp 98.3  F (36.8  C) (Oral)  Ht 1.854 m (6' 1\")  Wt 92.3 kg (203 lb 8 oz)  SpO2 95%  BMI 26.85 kg/m2      Gen: A&Ox3, NAD, well developed  HEENT: icteric sclera  CV: RRR, " tachycardia, no overt murmurs  Lung: CTA bilaterally, no wheezing or crackles.   Abd: soft, NT, ND, umbilical hernia with mild ascites  Ext: no edema, intact pulses.   Skin: No rash, no palmar erythema, jaundice skin  Neuro: grossly intact, no asterixis   Psych: appropriate mood and affects         Data:   Reviewed in person and significant for:    Lab Results   Component Value Date     04/16/2018      Lab Results   Component Value Date    POTASSIUM 4.0 04/16/2018     Lab Results   Component Value Date    CHLORIDE 103 04/16/2018     Lab Results   Component Value Date    CO2 20 04/16/2018     Lab Results   Component Value Date    BUN 9 04/16/2018     Lab Results   Component Value Date    CR 0.61 04/16/2018       Lab Results   Component Value Date    WBC 7.7 04/16/2018     Lab Results   Component Value Date    HGB 11.5 04/16/2018     Lab Results   Component Value Date    HCT 34.0 04/16/2018     Lab Results   Component Value Date     04/16/2018     Lab Results   Component Value Date    PLT 88 04/16/2018       Lab Results   Component Value Date     04/16/2018     Lab Results   Component Value Date    ALT 72 04/16/2018     No results found for: BILICONJ   Lab Results   Component Value Date    BILITOTAL 15.4 04/16/2018       Lab Results   Component Value Date    ALBUMIN 2.4 04/16/2018     Lab Results   Component Value Date    PROTTOTAL 7.7 04/16/2018      Lab Results   Component Value Date    ALKPHOS 122 04/16/2018       Lab Results   Component Value Date    INR 2.15 04/16/2018       Magdalena Damon PA-C

## 2018-04-18 NOTE — MR AVS SNAPSHOT
After Visit Summary   4/18/2018    Sylvain Pitts    MRN: [id number]           Patient Information     Date Of Birth          1980        Visit Information        Provider Department      4/18/2018 1:00 PM Madgalena Damon PA-C [facility] Hepatology        Today's Diagnoses     Alcoholic cirrhosis of liver with ascites (H)    -  1       Follow-ups after your visit        Follow-up notes from your care team     Return in about 3 weeks (around 5/9/2018).      Your next 10 appointments already scheduled     May 05, 2018  9:00 AM CDT   Lab with  LAB   [facility] Lab ([facility])    [address]  1st Floor  [place] [id number] [phone]            May 07, 2018  9:00 AM CDT   (Arrive by 8:45 AM)   Return General Liver with Magdalena Damon PA-C   [facility] Hepatology ([facility])    [address]  [address]  [place] [address] [phone]              Future tests that were ordered for you today     Open Future Orders        Priority Expected Expires Ordered    CBC with platelets Routine 5/2/2018 5/18/2018 4/18/2018    INR Routine 5/2/2018 5/18/2018 4/18/2018    Basic metabolic panel Routine 5/2/2018 5/18/2018 4/18/2018    Hepatic panel Routine 5/2/2018 5/18/2018 4/18/2018            Who to contact     If you have questions or need follow up information about today's clinic visit or your schedule please contact [facility] HEPATOLOGY directly at [phone].  Normal or non-critical lab and imaging results will be communicated to you by MyChart, letter or phone within 4 business days after the clinic has received the results. If you do not hear from us within 7 days, please contact the clinic through MyChart or phone. If you have a critical or abnormal lab result, we will notify you by phone as soon as possible.  Submit refill requests through myTomorrow[facility] or call your pharmacy and they will forward the refill  "request to us. Please allow 3 business days for your refill to be completed.          Additional Information About Your Visit        [employer] Information     [employer] gives you secure access to your electronic health record. If you see a primary care provider, you can also send messages to your care team and make appointments. If you have questions, please call your primary care clinic.  If you do not have a primary care provider, please call [phone] and they will assist you.        Care EveryWhere ID     This is your Care EveryWhere ID. This could be used by other organizations to access your [facility] medical records  [id number]        Your Vitals Were     Pulse Temperature Height Pulse Oximetry BMI (Body Mass Index)       109 98.3  F (36.8  C) (Oral) 1.854 m (6' 1\") 95% 26.85 kg/m2        Blood Pressure from Last 3 Encounters:   04/18/18 148/89   04/02/18 143/89   03/27/18 136/88    Weight from Last 3 Encounters:   04/18/18 92.3 kg (203 lb 8 oz)   04/02/18 102.4 kg (225 lb 12.8 oz)   03/27/18 101.2 kg (223 lb)               Primary Care Provider Office Phone # Fax #    [facility] [phone] [address] [address]        Equal Access to Services     NANI OSUNA AH: Hadii aad ku hadasho Soomaali, waaxda luqadaha, qaybta kaalmada adeegyada, loretta gibson. So [facility] [phone].    ATENCIÓN: Si habla español, tiene a henriquez disposición servicios gratuitos de asistencia lingüística. Llame al [phone].    We comply with applicable federal civil rights laws and [place] laws. We do not discriminate on the basis of race, color, national origin, age, disability, sex, sexual orientation, or gender identity.            Thank you!     Thank you for choosing [facility] HEPATOLOGY  for your care. Our goal is always to provide you with excellent care. Hearing back from our patients is one way we can continue to improve our " services. Please take a few minutes to complete the written survey that you may receive in the mail after your visit with us. Thank you!             Your Updated Medication List - Protect others around you: Learn how to safely use, store and throw away your medicines at www.disposemymeds.org.          This list is accurate as of 4/18/18  8:48 PM.  Always use your most recent med list.                   Brand Name Dispense Instructions for use Diagnosis    B-1 100 MG Tabs     30 tablet    Take 100 mg by mouth daily    Community acquired pneumonia of right lower lobe of lung (H), Alcoholic hepatitis with ascites, Leg edema, right       folic acid 1 MG tablet    FOLVITE    30 tablet    Take 1 tablet (1 mg) by mouth daily    Community acquired pneumonia of right lower lobe of lung (H), Alcoholic hepatitis with ascites, Leg edema, right       furosemide 20 MG tablet    LASIX    30 tablet    Take 1 tablet (20 mg) by mouth daily    Community acquired pneumonia of right lower lobe of lung (H), Alcoholic hepatitis with ascites, Leg edema, right       spironolactone 50 MG tablet    ALDACTONE    30 tablet    Take 1 tablet (50 mg) by mouth daily    Community acquired pneumonia of right lower lobe of lung (H), Alcoholic hepatitis with ascites, Leg edema, right

## 2018-04-18 NOTE — LETTER
4/18/2018       RE: Sylvain Pitts  [address]  [place] [address]     Dear Colleague,    Thank you for referring your patient, Sylvain Pitts, to the [facility] HEPATOLOGY at [facility]. Please see a copy of my visit note below.      Hepatology Clinic note  Sylvain Pitts   Date of Birth 1980  Date of Service 4/18/2018           Assessment/plan:   Sylvain Pitts is a 38 year old male presenting for follow of alcohol hepatitis/cirrhosis. He has been sober for about 3 weeks. His THO has resolved and ascites has improved after starting diuretics. Last paracentesis was 3/21. He does note some persistent difficulty breathing with exertion. His liver function remains relatively stable, current MELD is 27, slight rise in his bilirubin. We discussed the importance of abstinence and focusing on good nutrition for healing. He is still ill with expected slow recovery.  He will need an EGD in the near future to screen for esophageal varices, which was not discussed today.     1. Persistent shortness of breath:   -Repeat chest x-ray to rule out pneumonia / pleural effusion     2. Ascites/lower extremity edema:   - Continue Lasix 20 mg and spironolactone 50 mg daily  - Continue 2 gm sodium/high protein diet     3. Continue abstinence from alcohol  - Continue multivitamin, thiamine and folate    4. Repeat labs in 2 weeks    5.  Return to clinic in 3 weeks    Magdalena Damon PA-C   [facility] Hepatology     -----------------------------------------------------       HPI:   Sylvain Pitts is a 38 year old male with alcohol hepatitis complicated by ascites and lower extremity edema. No HE Patient was last seen in clinic on 4/2/2018 by Dr. Monge.    Patient states that he feels like he is doing better since last visit.  He still notes that he has difficulty breathing when going up stairs.  His coughing has improved, but he may have coughing  spells every 3 days. He states he has lost about 15 pounds since extra fluid has been removed. He states swelling in his legs has resolved.  He continues to feel fatigued.  He is checking his temperature regularly and denies any fevers. He states his appetite has improved and he is eating numerous small meals throughout the day. His last EtOH use was the end of March. He otherwise denies any history of melena, hematochezia. He is having regular bowel movements.    Patient recent hospitalization 3/14 - 3/16, for alcohol dependence, detox, alcoholic hepatitis/cirrhosis. Readmitted on 3/18-3/22 with pneumonia, required diagnostic thoracentesis. He has a paracentesis on 3/21 with 1 L removed.       Medical hx Surgical hx   Past Medical History:   Diagnosis Date     Anxiety      Cirrhosis (H)      Kidney stones      Pneumonia     Past Surgical History:   Procedure Laterality Date     HEAD & NECK SURGERY      head surgery when he was 14 years old.                 Medications:     Current Outpatient Prescriptions   Medication     folic acid (FOLVITE) 1 MG tablet     furosemide (LASIX) 20 MG tablet     spironolactone (ALDACTONE) 50 MG tablet     Thiamine HCl (B-1) 100 MG TABS     No current facility-administered medications for this visit.             Allergies:   No Known Allergies         Social History:     Social History     Social History     Marital status: Single     Spouse name: N/A     Number of children: N/A     Years of education: N/A     Occupational History     Not on file.     Social History Main Topics     Smoking status: Former Smoker     Smokeless tobacco: Never Used     Alcohol use Yes      Comment: every day, except stopped drinking on 3/13/18     Drug use: No     Sexual activity: Not on file     Other Topics Concern     Not on file     Social History Narrative            Family History:   History reviewed. No pertinent family history.         Review of Systems:   See HPI         Physical Exam:   VS:  BP  "148/89  Pulse 109  Temp 98.3  F (36.8  C) (Oral)  Ht 1.854 m (6' 1\")  Wt 92.3 kg (203 lb 8 oz)  SpO2 95%  BMI 26.85 kg/m2      Gen: A&Ox3, NAD, well developed  HEENT: icteric sclera  CV: RRR, tachycardia, no overt murmurs  Lung: CTA bilaterally, no wheezing or crackles.   Abd: soft, NT, ND, umbilical hernia with mild ascites  Ext: no edema, intact pulses.   Skin: No rash, no palmar erythema, jaundice skin  Neuro: grossly intact, no asterixis   Psych: appropriate mood and affects         Data:   Reviewed in person and significant for:    Lab Results   Component Value Date     04/16/2018      Lab Results   Component Value Date    POTASSIUM 4.0 04/16/2018     Lab Results   Component Value Date    CHLORIDE 103 04/16/2018     Lab Results   Component Value Date    CO2 20 04/16/2018     Lab Results   Component Value Date    BUN 9 04/16/2018     Lab Results   Component Value Date    CR 0.61 04/16/2018       Lab Results   Component Value Date    WBC 7.7 04/16/2018     Lab Results   Component Value Date    HGB 11.5 04/16/2018     Lab Results   Component Value Date    HCT 34.0 04/16/2018     Lab Results   Component Value Date     04/16/2018     Lab Results   Component Value Date    PLT 88 04/16/2018       Lab Results   Component Value Date     04/16/2018     Lab Results   Component Value Date    ALT 72 04/16/2018     No results found for: BILICONJ   Lab Results   Component Value Date    BILITOTAL 15.4 04/16/2018       Lab Results   Component Value Date    ALBUMIN 2.4 04/16/2018     Lab Results   Component Value Date    PROTTOTAL 7.7 04/16/2018      Lab Results   Component Value Date    ALKPHOS 122 04/16/2018       Lab Results   Component Value Date    INR 2.15 04/16/2018       Again, thank you for allowing me to participate in the care of your patient.      Sincerely,    Magdalena Damon PA-C  "

## 2018-04-18 NOTE — NURSING NOTE
Chief Complaint   Patient presents with     Consult     Alcoholic Hepatitis   Pt roomed, vitals, meds, and allergies reviewed with pt. Pt ready for provider.  Kar Moseley, CMA

## 2018-04-18 NOTE — PROGRESS NOTES
Hepatology Clinic note  Sylvain Pitts   Date of Birth 1980  Date of Service 4/18/2018           Assessment/plan:   Sylvain Pitts is a 38 year old male presenting for follow of alcohol hepatitis/cirrhosis. He has been sober for about 3 weeks. His THO has resolved and ascites has improved after starting diuretics. Last paracentesis was 3/21. He does note some persistent difficulty breathing with exertion. His liver function remains relatively stable, current MELD is 27, slight rise in his bilirubin. We discussed the importance of abstinence and focusing on good nutrition for healing. He is still ill with expected slow recovery.  He will need an EGD in the near future to screen for esophageal varices, which was not discussed today.     1. Persistent shortness of breath:   -Repeat chest x-ray to rule out pneumonia / pleural effusion     2. Ascites/lower extremity edema:   - Continue Lasix 20 mg and spironolactone 50 mg daily  - Continue 2 gm sodium/high protein diet     3. Continue abstinence from alcohol  - Continue multivitamin, thiamine and folate    4. Repeat labs in 2 weeks    5.  Return to clinic in 3 weeks    Magdalena Damon PA-C   [facility]     -----------------------------------------------------       HPI:   Sylvain Pitts is a 38 year old male with alcohol hepatitis complicated by ascites and lower extremity edema. No HE Patient was last seen in clinic on 4/2/2018 by Dr. Monge.    Patient states that he feels like he is doing better since last visit.  He still notes that he has difficulty breathing when going up stairs.  His coughing has improved, but he may have coughing spells every 3 days. He states he has lost about 15 pounds since extra fluid has been removed. He states swelling in his legs has resolved.  He continues to feel fatigued.  He is checking his temperature regularly and denies any fevers. He states his appetite has improved and he is eating numerous  "small meals throughout the day. His last EtOH use was the end of March. He otherwise denies any history of melena, hematochezia. He is having regular bowel movements.    Patient recent hospitalization 3/14 - 3/16, for alcohol dependence, detox, alcoholic hepatitis/cirrhosis. Readmitted on 3/18-3/22 with pneumonia, required diagnostic thoracentesis. He has a paracentesis on 3/21 with 1 L removed.       Medical hx Surgical hx   Past Medical History:   Diagnosis Date     Anxiety      Cirrhosis (H)      Kidney stones      Pneumonia     Past Surgical History:   Procedure Laterality Date     HEAD & NECK SURGERY      head surgery when he was 14 years old.                 Medications:     Current Outpatient Prescriptions   Medication     folic acid (FOLVITE) 1 MG tablet     furosemide (LASIX) 20 MG tablet     spironolactone (ALDACTONE) 50 MG tablet     Thiamine HCl (B-1) 100 MG TABS     No current facility-administered medications for this visit.             Allergies:   No Known Allergies         Social History:     Social History     Social History     Marital status: Single     Spouse name: N/A     Number of children: N/A     Years of education: N/A     Occupational History     Not on file.     Social History Main Topics     Smoking status: Former Smoker     Smokeless tobacco: Never Used     Alcohol use Yes      Comment: every day, except stopped drinking on 3/13/18     Drug use: No     Sexual activity: Not on file     Other Topics Concern     Not on file     Social History Narrative            Family History:   History reviewed. No pertinent family history.         Review of Systems:   See HPI         Physical Exam:   VS:  /89  Pulse 109  Temp 98.3  F (36.8  C) (Oral)  Ht 1.854 m (6' 1\")  Wt 92.3 kg (203 lb 8 oz)  SpO2 95%  BMI 26.85 kg/m2      Gen: A&Ox3, NAD, well developed  HEENT: icteric sclera  CV: RRR, tachycardia, no overt murmurs  Lung: CTA bilaterally, no wheezing or crackles.   Abd: soft, NT, ND, " umbilical hernia with mild ascites  Ext: no edema, intact pulses.   Skin: No rash, no palmar erythema, jaundice skin  Neuro: grossly intact, no asterixis   Psych: appropriate mood and affects         Data:   Reviewed in person and significant for:    Lab Results   Component Value Date     04/16/2018      Lab Results   Component Value Date    POTASSIUM 4.0 04/16/2018     Lab Results   Component Value Date    CHLORIDE 103 04/16/2018     Lab Results   Component Value Date    CO2 20 04/16/2018     Lab Results   Component Value Date    BUN 9 04/16/2018     Lab Results   Component Value Date    CR 0.61 04/16/2018       Lab Results   Component Value Date    WBC 7.7 04/16/2018     Lab Results   Component Value Date    HGB 11.5 04/16/2018     Lab Results   Component Value Date    HCT 34.0 04/16/2018     Lab Results   Component Value Date     04/16/2018     Lab Results   Component Value Date    PLT 88 04/16/2018       Lab Results   Component Value Date     04/16/2018     Lab Results   Component Value Date    ALT 72 04/16/2018     No results found for: BILICONJ   Lab Results   Component Value Date    BILITOTAL 15.4 04/16/2018       Lab Results   Component Value Date    ALBUMIN 2.4 04/16/2018     Lab Results   Component Value Date    PROTTOTAL 7.7 04/16/2018      Lab Results   Component Value Date    ALKPHOS 122 04/16/2018       Lab Results   Component Value Date    INR 2.15 04/16/2018

## 2018-04-19 ENCOUNTER — TELEPHONE (OUTPATIENT)
Dept: GASTROENTEROLOGY | Facility: CLINIC | Age: 38
End: 2018-04-19

## 2018-04-19 NOTE — TELEPHONE ENCOUNTER
----- Message from Magdalena Damon PA-C sent at 4/19/2018  1:04 PM CDT -----  Regarding: please call  Can you call the patient and let him know that he continues to have to some fluid in his lungs on the chest x-ray, so we will increase his diuretics.     Please in increase Lasix to 40 mg daily (2 tablets) and Spironolactone 100 mg (2 tablets). Check a BMP in one week.     He should have a paracentesis in the future if he feels like his abdomen feels fulls.     Thanks, Magdalena

## 2018-05-05 DIAGNOSIS — K70.31 ALCOHOLIC CIRRHOSIS OF LIVER WITH ASCITES (H): ICD-10-CM

## 2018-05-05 LAB
ALBUMIN SERPL-MCNC: 2.7 G/DL (ref 3.4–5)
ALP SERPL-CCNC: 178 U/L (ref 40–150)
ALT SERPL W P-5'-P-CCNC: 106 U/L (ref 0–70)
ANION GAP SERPL CALCULATED.3IONS-SCNC: 12 MMOL/L (ref 3–14)
AST SERPL W P-5'-P-CCNC: 145 U/L (ref 0–45)
BILIRUB DIRECT SERPL-MCNC: 6.7 MG/DL (ref 0–0.2)
BILIRUB SERPL-MCNC: 10.6 MG/DL (ref 0.2–1.3)
BUN SERPL-MCNC: 18 MG/DL (ref 7–30)
CALCIUM SERPL-MCNC: 8.8 MG/DL (ref 8.5–10.1)
CHLORIDE SERPL-SCNC: 101 MMOL/L (ref 94–109)
CO2 SERPL-SCNC: 18 MMOL/L (ref 20–32)
CREAT SERPL-MCNC: 0.68 MG/DL (ref 0.66–1.25)
ERYTHROCYTE [DISTWIDTH] IN BLOOD BY AUTOMATED COUNT: 15.7 % (ref 10–15)
GFR SERPL CREATININE-BSD FRML MDRD: >90 ML/MIN/1.7M2
GLUCOSE SERPL-MCNC: 133 MG/DL (ref 70–99)
HCT VFR BLD AUTO: 32.9 % (ref 40–53)
HGB BLD-MCNC: 11.2 G/DL (ref 13.3–17.7)
INR PPP: 1.85 (ref 0.86–1.14)
MCH RBC QN AUTO: 38.6 PG (ref 26.5–33)
MCHC RBC AUTO-ENTMCNC: 34 G/DL (ref 31.5–36.5)
MCV RBC AUTO: 113 FL (ref 78–100)
PLATELET # BLD AUTO: 96 10E9/L (ref 150–450)
POTASSIUM SERPL-SCNC: 4.4 MMOL/L (ref 3.4–5.3)
PROT SERPL-MCNC: 8.2 G/DL (ref 6.8–8.8)
RBC # BLD AUTO: 2.9 10E12/L (ref 4.4–5.9)
SODIUM SERPL-SCNC: 131 MMOL/L (ref 133–144)
WBC # BLD AUTO: 8.7 10E9/L (ref 4–11)

## 2018-05-07 ENCOUNTER — TELEPHONE (OUTPATIENT)
Dept: GASTROENTEROLOGY | Facility: CLINIC | Age: 38
End: 2018-05-07

## 2018-05-07 ENCOUNTER — OFFICE VISIT (OUTPATIENT)
Dept: GASTROENTEROLOGY | Facility: CLINIC | Age: 38
End: 2018-05-07
Attending: PHYSICIAN ASSISTANT
Payer: COMMERCIAL

## 2018-05-07 VITALS
TEMPERATURE: 98.1 F | WEIGHT: 192.5 LBS | DIASTOLIC BLOOD PRESSURE: 72 MMHG | HEIGHT: 73 IN | BODY MASS INDEX: 25.51 KG/M2 | HEART RATE: 84 BPM | SYSTOLIC BLOOD PRESSURE: 130 MMHG

## 2018-05-07 DIAGNOSIS — K70.11 ALCOHOLIC HEPATITIS WITH ASCITES (H): Primary | ICD-10-CM

## 2018-05-07 PROCEDURE — G0463 HOSPITAL OUTPT CLINIC VISIT: HCPCS | Mod: ZF

## 2018-05-07 RX ORDER — SPIRONOLACTONE 100 MG/1
100 TABLET, FILM COATED ORAL DAILY
Qty: 90 TABLET | Refills: 3 | Status: SHIPPED | OUTPATIENT
Start: 2018-05-07

## 2018-05-07 RX ORDER — FOLIC ACID 1 MG/1
1 TABLET ORAL DAILY
Qty: 90 TABLET | Refills: 1 | Status: SHIPPED | OUTPATIENT
Start: 2018-05-07

## 2018-05-07 RX ORDER — MULTIVITAMIN,THERAPEUTIC
1 TABLET ORAL DAILY
Qty: 90 TABLET | Refills: 0 | Status: SHIPPED | OUTPATIENT
Start: 2018-05-07

## 2018-05-07 RX ORDER — FUROSEMIDE 40 MG
40 TABLET ORAL DAILY
Qty: 90 TABLET | Refills: 3 | Status: SHIPPED | OUTPATIENT
Start: 2018-05-07

## 2018-05-07 RX ORDER — METHION/INOS/CHOL BT/B COM/LIV 110MG-86MG
100 CAPSULE ORAL DAILY
Qty: 90 TABLET | Refills: 1 | Status: SHIPPED | OUTPATIENT
Start: 2018-05-07

## 2018-05-07 ASSESSMENT — PAIN SCALES - GENERAL: PAINLEVEL: NO PAIN (0)

## 2018-05-07 NOTE — LETTER
2018      RE: Sylvain Pitts  [address]  [place] [address]       Hepatology Follow-up Clinic note  Sylvain Pitts   Date of Birth 1980  Date of Service 2018    Reason for follow-up: ETOH Hepatitis          Assessment/plan:   Sylvain Pitts is a 38 year old male with EtOH hepatitis, complicated by ascites/THO. He still appears to have a mild amount of ascites, THO has improved. His last EtOH intake was 2018.  It is unknown at this time if patient has baseline cirrhosis at this time. Patient has never had an EGD to screen for esophageal varices. He is up to date on HCC screening. He will be going to [place] in a few weeks for a month, so it was recommended that he have an EGD prior to trip.  His liver function is improving slowly as expected . Encouraged patient to seek support group or behavior therapy for sobriety maintanence.  Refills sent for prescriptions.     1. Continue ETOH abstinence  2. Continue multivitamin, thiamin and folate   3. Schedule EGD in the next 2 weeks, prior to trip to [place]  3. Hx of ascites/THO:   - High protein, 2 gram sodium diet   - Continue 40 mg Lasix daily   -Continue 100 mg spironolactone daily   4. Follow up in clinic in 2 months     Magdalena Damon PA-C   [facility] Hepatology clinic    -----------------------------------------------------       HPI:   Sylvain Pitts is a 38 year old male with ETOH Hepatitis presenting for follow-up.     Alcoholic Hepatitis  - Dx: 3/4/2018  complicated by   Hx of ascites/THO, on diuretics  No history of GI bleed  No hx of HE  HCC Screenin/3/2018  - EGD: no recent EGD. Patient had an EGD in [place] a few years and was told he had an ulcer.     Patient was last seen in clinic on 2018. His diuretics were doubled after last appointment.  He is currently taking 40 mg Lasix and 100 mg Spironolactone daily. Patient states that his cough resolved about 1 week ago.  He no  "longer has any fluid in legs.  He is not certain if he has fluid in his belly, but he notes that he is eating very well, has a good appetite and is eating frequently.  He has been following a low-sodium diet.  He has been drinking protein shakes and a lot of fruits. He has plans to travel to [place] May 21 - July 9, where he is selling his house. He states continues to urinate a lot. He thinks he's lost another 10 lbs.     His last alcohol use was March 13.  He does not currently go to any support group in the has not gone to any type of chemical dependency program.  He declines the urge to drink again.    Patient denies jaundice, lower extremity edema,  or confusion.   Patient also denies melena, hematochezia or hematemesis.    Patient denies fevers, sweats or chills.    Medical hx Surgical hx   Past Medical History:   Diagnosis Date     Anxiety      Cirrhosis (H)      Kidney stones      Pneumonia     Past Surgical History:   Procedure Laterality Date     HEAD & NECK SURGERY      head surgery when he was 14 years old.                 Medications:     Current Outpatient Prescriptions   Medication     folic acid (FOLVITE) 1 MG tablet     furosemide (LASIX) 40 MG tablet     multivitamin, therapeutic (THERA-VIT) TABS tablet     spironolactone (ALDACTONE) 100 MG tablet     Thiamine HCl (B-1) 100 MG TABS     [DISCONTINUED] furosemide (LASIX) 20 MG tablet     [DISCONTINUED] spironolactone (ALDACTONE) 50 MG tablet     No current facility-administered medications for this visit.             Allergies:   No Known Allergies         Review of Systems:   10 points ROS was obtained and highlighted in the HPI, otherwise negative.          Physical Exam:   VS:  /72  Pulse 84  Temp 98.1  F (36.7  C) (Oral)  Ht 1.854 m (6' 1\")  Wt 87.3 kg (192 lb 8 oz)  BMI 25.4 kg/m2      Gen: A&Ox3, NAD, well developed  HEENT: Icteric sclera   CV: RRR, no overt murmurs  Lung: CTA Bilatererally, no wheezing or crackles.   Lym- no palpable " lymphadenopathy  Abd: soft, NT, ND, palpable hepatomegaly and splenomegaly. Mild amount of ascites, Umbilical hernia  Ext: no edema, intact pulses.   Skin: Venous stasis in bilateral legs, visible telangiectasias, visible jaundice  Neuro: grossly intact, no asterixis   Psych: appropriate mood and affects         Data:   Reviewed in person and significant for:    Lab Results   Component Value Date     05/05/2018      Lab Results   Component Value Date    POTASSIUM 4.4 05/05/2018     Lab Results   Component Value Date    CHLORIDE 101 05/05/2018     Lab Results   Component Value Date    CO2 18 05/05/2018     Lab Results   Component Value Date    BUN 18 05/05/2018     Lab Results   Component Value Date    CR 0.68 05/05/2018       Lab Results   Component Value Date    WBC 8.7 05/05/2018     Lab Results   Component Value Date    HGB 11.2 05/05/2018     Lab Results   Component Value Date    HCT 32.9 05/05/2018     Lab Results   Component Value Date     05/05/2018     Lab Results   Component Value Date    PLT 96 05/05/2018       Lab Results   Component Value Date     05/05/2018     Lab Results   Component Value Date     05/05/2018     No results found for: BILICONJ   Lab Results   Component Value Date    BILITOTAL 10.6 05/05/2018       Lab Results   Component Value Date    ALBUMIN 2.7 05/05/2018     Lab Results   Component Value Date    PROTTOTAL 8.2 05/05/2018      Lab Results   Component Value Date    ALKPHOS 178 05/05/2018       Lab Results   Component Value Date    INR 1.85 05/05/2018       Magdalena Damon PA-C

## 2018-05-07 NOTE — MR AVS SNAPSHOT
After Visit Summary   5/7/2018    Sylvain Pitts    MRN: [id number]           Patient Information     Date Of Birth          1980        Visit Information        Provider Department      5/7/2018 9:00 AM Magdalena Damon PA-C [facility]at[facility]        Today's Diagnoses     Alcoholic hepatitis with ascites    -  1       Follow-ups after your visit        Additional Services     GASTROENTEROLOGY ADULT REF PROCEDURE ONLY       Recent alcohol use, may need MAC.   Please schedule with Dr. Monge within the next two weeks, if not, please schedule with Dr. Lee, Dr. Rodriguez or Dr. Wren    Last Lab Result: Creatinine (mg/dL)       Date                     Value                 05/05/2018               0.68             ----------  Body mass index is 25.4 kg/(m^2).     Needed:  No  Language:  Lebanese    Patient will be contacted to schedule procedure.     Please be aware that coverage of these services is subject to the terms and limitations of your health insurance plan.  Call member services at your health plan with any benefit or coverage questions.  Any procedures must be performed at a [facility] facility OR coordinated by your clinic's referral office.    Please bring the following with you to your appointment:    (1) Any X-Rays, CTs or MRIs which have been performed.  Contact the facility where they were done to arrange for  prior to your scheduled appointment.    (2) List of current medications   (3) This referral request   (4) Any documents/labs given to you for this referral                  Your next 10 appointments already scheduled     May 12, 2018  8:00 AM CDT   LAB with [facility] ([facility])    [address] [place] [address] [phone]           Please do not eat 10-12 hours before your appointment if you are coming in fasting for labs on lipids, cholesterol, or glucose (sugar). This does not  apply to pregnant women. Water, hot tea and black coffee (with nothing added) are okay. Do not drink other fluids, diet soda or chew gum.            May 16, 2018  2:30 PM CDT   Upper Endoscopy with Elias Lee MD   [facility] ([facility])    [address]  [address]  [place] [address]   [phone]            Jul 14, 2018  8:00 AM CDT   LAB with  LAB   [facility] ([facility])    [address]  1st [address]  [place] [id number] [phone]           Please do not eat 10-12 hours before your appointment if you are coming in fasting for labs on lipids, cholesterol, or glucose (sugar). This does not apply to pregnant women. Water, hot tea and black coffee (with nothing added) are okay. Do not drink other fluids, diet soda or chew gum.            Jul 16, 2018  9:00 AM CDT   (Arrive by 8:45 AM)   Return General Liver with Magdalena Damon PA-C   [facility] ([facility])    [address]  [address]  [place] [address] [phone]              Future tests that were ordered for you today     Open Future Orders        Priority Expected Expires Ordered    Basic metabolic panel Routine 5/14/2018 6/6/2018 5/7/2018            Who to contact     If you have questions or need follow up information about today's clinic visit or your schedule please contact [facility]PATOLOG[facility] directly at [phone].  Normal or non-critical lab and imaging results will be communicated to you by Arista Powerhart, letter or phone within 4 business days after the clinic has received the results. If you do not hear from us within 7 days, please contact the clinic through Arista Powerhart or phone. If you have a critical or abnormal lab result, we will notify you by phone as soon as possible.  Submit refill requests through Efizity or call your pharmacy and they will forward the refill request to us. Please  "allow 3 business days for your refill to be completed.          Additional Information About Your Visit        Kaboodlehart Information     [employer] gives you secure access to your electronic health record. If you see a primary care provider, you can also send messages to your care team and make appointments. If you have questions, please call your primary care clinic.  If you do not have a primary care provider, please call [phone] and they will assist you.        Care EveryWhere ID     This is your Care EveryWhere ID. This could be used by other organizations to access your [facility] medical records  [id number]        Your Vitals Were     Pulse Temperature Height BMI (Body Mass Index)          84 98.1  F (36.7  C) (Oral) 1.854 m (6' 1\") 25.4 kg/m2         Blood Pressure from Last 3 Encounters:   05/07/18 130/72   04/18/18 148/89   04/02/18 143/89    Weight from Last 3 Encounters:   05/07/18 87.3 kg (192 lb 8 oz)   04/18/18 92.3 kg (203 lb 8 oz)   04/02/18 102.4 kg (225 lb 12.8 oz)              We Performed the Following     GASTROENTEROLOGY ADULT REF PROCEDURE ONLY          Today's Medication Changes          These changes are accurate as of 5/7/18  9:52 AM.  If you have any questions, ask your nurse or doctor.               Start taking these medicines.        Dose/Directions    multivitamin, therapeutic Tabs tablet   Used for:  Alcoholic hepatitis with ascites   Started by:  Magdalena Damon PA-C        Dose:  1 tablet   Take 1 tablet by mouth daily   Quantity:  90 tablet   Refills:  0         These medicines have changed or have updated prescriptions.        Dose/Directions    furosemide 40 MG tablet   Commonly known as:  LASIX   This may have changed:    - medication strength  - how much to take   Used for:  Alcoholic hepatitis with ascites   Changed by:  Magdalena Damon PA-C        Dose:  40 mg   Take 1 tablet (40 mg) by mouth daily   Quantity:  90 tablet   Refills:  3       spironolactone 100 " MG tablet   Commonly known as:  ALDACTONE   This may have changed:    - medication strength  - how much to take   Used for:  Alcoholic hepatitis with ascites   Changed by:  Magdalena Damon PA-C        Dose:  100 mg   Take 1 tablet (100 mg) by mouth daily   Quantity:  90 tablet   Refills:  3            Where to get your medicines      These medications were sent to [place], [place] - [address]  [address], [place] [facility] [address]    Hours:  TRANSPLANT PHONE NUMBER [phone] Phone:  [phone]     B-1 100 MG Tabs    folic acid 1 MG tablet    furosemide 40 MG tablet    multivitamin, therapeutic Tabs tablet    spironolactone 100 MG tablet                Primary Care Provider Office Phone # Fax #    [facility] [phone] [address] [address] [address]        Equal Access to Services     NANI OSUNA AH: Hadii aad ku hadasho Soomaali, waaxda luqadaha, qaybta kaalmada adeegyada, waxay idiin hayaan liz gibson. So [facility] [phone].    ATENCIÓN: Si habla español, tiene a henriquez disposición servicios gratuitos de asistencia lingüística. [facility] [phone].    We comply with applicable federal civil rights laws and [place] laws. We do not discriminate on the basis of race, color, national origin, age, disability, sex, sexual orientation, or gender identity.            Thank you!     Thank you for choosing [facility] HEPATOLOG[facility]  for your care. Our goal is always to provide you with excellent care. Hearing back from our patients is one way we can continue to improve our services. Please take a few minutes to complete the written survey that you may receive in the mail after your visit with us. Thank you!             Your Updated Medication List - Protect others around you: Learn how to safely use, store and throw away your medicines at www.disposemymeds.org.          This list  is accurate as of 5/7/18  9:52 AM.  Always use your most recent med list.                   Brand Name Dispense Instructions for use Diagnosis    B-1 100 MG Tabs     90 tablet    Take 100 mg by mouth daily    Alcoholic hepatitis with ascites       folic acid 1 MG tablet    FOLVITE    90 tablet    Take 1 tablet (1 mg) by mouth daily    Alcoholic hepatitis with ascites       furosemide 40 MG tablet    LASIX    90 tablet    Take 1 tablet (40 mg) by mouth daily    Alcoholic hepatitis with ascites       multivitamin, therapeutic Tabs tablet     90 tablet    Take 1 tablet by mouth daily    Alcoholic hepatitis with ascites       spironolactone 100 MG tablet    ALDACTONE    90 tablet    Take 1 tablet (100 mg) by mouth daily    Alcoholic hepatitis with ascites

## 2018-05-07 NOTE — LETTER
2018      RE: Sylvain Pitts  [address]  [facility] [address]       Hepatology Follow-up Clinic note  Sylvain Pitts   Date of Birth 1980  Date of Service 2018    Reason for follow-up: ETOH Hepatitis          Assessment/plan:   Sylvain Pitts is a 38 year old male with EtOH hepatitis, complicated by ascites/THO. He still appears to have a mild amount of ascites, THO has improved. His last EtOH intake was 2018.  It is unknown at this time if patient has baseline cirrhosis at this time. Patient has never had an EGD to screen for esophageal varices. He is up to date on HCC screening. He will be going to [place] in a few weeks for a month, so it was recommended that he have an EGD prior to trip.  His liver function is improving slowly as expected . Encouraged patient to seek support group or behavior therapy for sobriety maintanence.  Refills sent for prescriptions.     1. Continue ETOH abstinence  2. Continue multivitamin, thiamin and folate   3. Schedule EGD in the next 2 weeks, prior to trip to [place]  3. Hx of ascites/THO:   - High protein, 2 gram sodium diet   - Continue 40 mg Lasix daily   -Continue 100 mg spironolactone daily   4. Follow up in clinic in 2 months     Magdalena Damon PA-C   [facility] Hepatology clinic    -----------------------------------------------------       HPI:   Sylvain Pitts is a 38 year old male with ETOH Hepatitis presenting for follow-up.     Alcoholic Hepatitis  - Dx: 3/4/2018  complicated by   Hx of ascites/THO, on diuretics  No history of GI bleed  No hx of HE  HCC Screenin/3/2018  - EGD: no recent EGD. Patient had an EGD in [place] a few years and was told he had an ulcer.     Patient was last seen in clinic on 2018. His diuretics were doubled after last appointment.  He is currently taking 40 mg Lasix and 100 mg Spironolactone daily. Patient states that his cough resolved about 1 week ago.  He no  "longer has any fluid in legs.  He is not certain if he has fluid in his belly, but he notes that he is eating very well, has a good appetite and is eating frequently.  He has been following a low-sodium diet.  He has been drinking protein shakes and a lot of fruits. He has plans to travel to [place] May 21 - July 9, where he is selling his house. He states continues to urinate a lot. He thinks he's lost another 10 lbs.     His last alcohol use was March 13.  He does not currently go to any support group in the has not gone to any type of chemical dependency program.  He declines the urge to drink again.    Patient denies jaundice, lower extremity edema,  or confusion.   Patient also denies melena, hematochezia or hematemesis.    Patient denies fevers, sweats or chills.    Medical hx Surgical hx   Past Medical History:   Diagnosis Date     Anxiety      Cirrhosis (H)      Kidney stones      Pneumonia     Past Surgical History:   Procedure Laterality Date     HEAD & NECK SURGERY      head surgery when he was 14 years old.                 Medications:     Current Outpatient Prescriptions   Medication     folic acid (FOLVITE) 1 MG tablet     furosemide (LASIX) 40 MG tablet     multivitamin, therapeutic (THERA-VIT) TABS tablet     spironolactone (ALDACTONE) 100 MG tablet     Thiamine HCl (B-1) 100 MG TABS     [DISCONTINUED] furosemide (LASIX) 20 MG tablet     [DISCONTINUED] spironolactone (ALDACTONE) 50 MG tablet     No current facility-administered medications for this visit.             Allergies:   No Known Allergies         Review of Systems:   10 points ROS was obtained and highlighted in the HPI, otherwise negative.          Physical Exam:   VS:  /72  Pulse 84  Temp 98.1  F (36.7  C) (Oral)  Ht 1.854 m (6' 1\")  Wt 87.3 kg (192 lb 8 oz)  BMI 25.4 kg/m2      Gen: A&Ox3, NAD, well developed  HEENT: Icteric sclera   CV: RRR, no overt murmurs  Lung: CTA Bilatererally, no wheezing or crackles.   Lym- no palpable " lymphadenopathy  Abd: soft, NT, ND, palpable hepatomegaly and splenomegaly. Mild amount of ascites, Umbilical hernia  Ext: no edema, intact pulses.   Skin: Venous stasis in bilateral legs, visible telangiectasias, visible jaundice  Neuro: grossly intact, no asterixis   Psych: appropriate mood and affects         Data:   Reviewed in person and significant for:    Lab Results   Component Value Date     05/05/2018      Lab Results   Component Value Date    POTASSIUM 4.4 05/05/2018     Lab Results   Component Value Date    CHLORIDE 101 05/05/2018     Lab Results   Component Value Date    CO2 18 05/05/2018     Lab Results   Component Value Date    BUN 18 05/05/2018     Lab Results   Component Value Date    CR 0.68 05/05/2018       Lab Results   Component Value Date    WBC 8.7 05/05/2018     Lab Results   Component Value Date    HGB 11.2 05/05/2018     Lab Results   Component Value Date    HCT 32.9 05/05/2018     Lab Results   Component Value Date     05/05/2018     Lab Results   Component Value Date    PLT 96 05/05/2018       Lab Results   Component Value Date     05/05/2018     Lab Results   Component Value Date     05/05/2018     No results found for: BILICONJ   Lab Results   Component Value Date    BILITOTAL 10.6 05/05/2018       Lab Results   Component Value Date    ALBUMIN 2.7 05/05/2018     Lab Results   Component Value Date    PROTTOTAL 8.2 05/05/2018      Lab Results   Component Value Date    ALKPHOS 178 05/05/2018       Lab Results   Component Value Date    INR 1.85 05/05/2018       Magdalena Damon PA-C

## 2018-05-07 NOTE — NURSING NOTE
Chief Complaint   Patient presents with     RECHECK     follow up with cirrhosis, tzimmer cma

## 2018-05-07 NOTE — PROGRESS NOTES
Hepatology Follow-up Clinic note  Sylvain Pitts   Date of Birth 1980  Date of Service 2018    Reason for follow-up: ETOH Hepatitis          Assessment/plan:   Sylvain Pitts is a 38 year old male with EtOH hepatitis, complicated by ascites/THO. He still appears to have a mild amount of ascites, THO has improved. His last EtOH intake was 2018.  It is unknown at this time if patient has baseline cirrhosis at this time. Patient has never had an EGD to screen for esophageal varices. He is up to date on HCC screening. He will be going to [place] in a few weeks for a month, so it was recommended that he have an EGD prior to trip.  His liver function is improving slowly as expected . Encouraged patient to seek support group or behavior therapy for sobriety maintanence.  Refills sent for prescriptions.     1. Continue ETOH abstinence  2. Continue multivitamin, thiamin and folate   3. Schedule EGD in the next 2 weeks, prior to trip to [place]  3. Hx of ascites/THO:   - High protein, 2 gram sodium diet   - Continue 40 mg Lasix daily   -Continue 100 mg spironolactone daily   4. Follow up in clinic in 2 months     Magdalena Damon PA-C   [facility] Hepatology clinic    -----------------------------------------------------       HPI:   Sylvain Pitts is a 38 year old male with ETOH Hepatitis presenting for follow-up.     Alcoholic Hepatitis  - Dx: 3/4/2018  complicated by   Hx of ascites/THO, on diuretics  No history of GI bleed  No hx of HE  HCC Screenin/3/2018  - EGD: no recent EGD. Patient had an EGD in [place] a few years and was told he had an ulcer.     Patient was last seen in clinic on 2018. His diuretics were doubled after last appointment.  He is currently taking 40 mg Lasix and 100 mg Spironolactone daily. Patient states that his cough resolved about 1 week ago.  He no longer has any fluid in legs.  He is not certain if he has fluid in his belly, but he notes that he  "is eating very well, has a good appetite and is eating frequently.  He has been following a low-sodium diet.  He has been drinking protein shakes and a lot of fruits. He has plans to travel to [place] May 21 - July 9, where he is selling his house. He states continues to urinate a lot. He thinks he's lost another 10 lbs.     His last alcohol use was March 13.  He does not currently go to any support group in the has not gone to any type of chemical dependency program.  He declines the urge to drink again.    Patient denies jaundice, lower extremity edema,  or confusion.   Patient also denies melena, hematochezia or hematemesis.    Patient denies fevers, sweats or chills.    Medical hx Surgical hx   Past Medical History:   Diagnosis Date     Anxiety      Cirrhosis (H)      Kidney stones      Pneumonia     Past Surgical History:   Procedure Laterality Date     HEAD & NECK SURGERY      head surgery when he was 14 years old.                 Medications:     Current Outpatient Prescriptions   Medication     folic acid (FOLVITE) 1 MG tablet     furosemide (LASIX) 40 MG tablet     multivitamin, therapeutic (THERA-VIT) TABS tablet     spironolactone (ALDACTONE) 100 MG tablet     Thiamine HCl (B-1) 100 MG TABS     [DISCONTINUED] furosemide (LASIX) 20 MG tablet     [DISCONTINUED] spironolactone (ALDACTONE) 50 MG tablet     No current facility-administered medications for this visit.             Allergies:   No Known Allergies         Review of Systems:   10 points ROS was obtained and highlighted in the HPI, otherwise negative.          Physical Exam:   VS:  /72  Pulse 84  Temp 98.1  F (36.7  C) (Oral)  Ht 1.854 m (6' 1\")  Wt 87.3 kg (192 lb 8 oz)  BMI 25.4 kg/m2      Gen: A&Ox3, NAD, well developed  HEENT: Icteric sclera   CV: RRR, no overt murmurs  Lung: CTA Bilatererally, no wheezing or crackles.   Lym- no palpable lymphadenopathy  Abd: soft, NT, ND, palpable hepatomegaly and splenomegaly. Mild amount of ascites, " Umbilical hernia  Ext: no edema, intact pulses.   Skin: Venous stasis in bilateral legs, visible telangiectasias, visible jaundice  Neuro: grossly intact, no asterixis   Psych: appropriate mood and affects         Data:   Reviewed in person and significant for:    Lab Results   Component Value Date     05/05/2018      Lab Results   Component Value Date    POTASSIUM 4.4 05/05/2018     Lab Results   Component Value Date    CHLORIDE 101 05/05/2018     Lab Results   Component Value Date    CO2 18 05/05/2018     Lab Results   Component Value Date    BUN 18 05/05/2018     Lab Results   Component Value Date    CR 0.68 05/05/2018       Lab Results   Component Value Date    WBC 8.7 05/05/2018     Lab Results   Component Value Date    HGB 11.2 05/05/2018     Lab Results   Component Value Date    HCT 32.9 05/05/2018     Lab Results   Component Value Date     05/05/2018     Lab Results   Component Value Date    PLT 96 05/05/2018       Lab Results   Component Value Date     05/05/2018     Lab Results   Component Value Date     05/05/2018     No results found for: BILICONJ   Lab Results   Component Value Date    BILITOTAL 10.6 05/05/2018       Lab Results   Component Value Date    ALBUMIN 2.7 05/05/2018     Lab Results   Component Value Date    PROTTOTAL 8.2 05/05/2018      Lab Results   Component Value Date    ALKPHOS 178 05/05/2018       Lab Results   Component Value Date    INR 1.85 05/05/2018

## 2018-05-09 ENCOUNTER — TELEPHONE (OUTPATIENT)
Dept: GASTROENTEROLOGY | Facility: OUTPATIENT CENTER | Age: 38
End: 2018-05-09

## 2018-05-10 ENCOUNTER — TELEPHONE (OUTPATIENT)
Dept: GASTROENTEROLOGY | Facility: OUTPATIENT CENTER | Age: 38
End: 2018-05-10

## 2018-05-10 NOTE — TELEPHONE ENCOUNTER
Patient taking any blood thinners ? No    Heart disease ? denies    Lung disease ? denies      Sleep apnea ? denies    Diabetic ? denies    Kidney disease ? denies    Dialysis ? n/a    Electronic implanted medical devices ? denies    Are you taking any narcotic pain medication ?  no  What is your daily dosage ?    PTSD ? n/a    Prep instructions reviewed with patient ? Instructions,  policy, MAC sedation plan reviewed. Advised patient to have someone stay with him post exam    Pharmacy : n/a    Indication for procedure :Alcoholic hepatitis with ascites [K70.11]     Referring provider :Magdalena Damon PA-C     Arrival Time : Patient will arrive at 1:30 PM

## 2018-05-12 DIAGNOSIS — K70.11 ALCOHOLIC HEPATITIS WITH ASCITES (H): ICD-10-CM

## 2018-05-12 DIAGNOSIS — K70.31 ALCOHOLIC CIRRHOSIS OF LIVER WITH ASCITES (H): Primary | ICD-10-CM

## 2018-05-12 LAB
ANION GAP SERPL CALCULATED.3IONS-SCNC: 10 MMOL/L (ref 3–14)
BUN SERPL-MCNC: 21 MG/DL (ref 7–30)
CALCIUM SERPL-MCNC: 8.9 MG/DL (ref 8.5–10.1)
CHLORIDE SERPL-SCNC: 101 MMOL/L (ref 94–109)
CO2 SERPL-SCNC: 21 MMOL/L (ref 20–32)
CREAT SERPL-MCNC: 0.75 MG/DL (ref 0.66–1.25)
GFR SERPL CREATININE-BSD FRML MDRD: >90 ML/MIN/1.7M2
GLUCOSE SERPL-MCNC: 109 MG/DL (ref 70–99)
POTASSIUM SERPL-SCNC: 4.4 MMOL/L (ref 3.4–5.3)
SODIUM SERPL-SCNC: 133 MMOL/L (ref 133–144)

## 2018-05-12 NOTE — LETTER
May 21, 2018       TO: Sylvain Pitts  [address]  [facility] [address]       Dear ,     To Whom It May Concern:      This is a letter of medical necessity for Mr. Pitts. Mr. Pitts is a patient of mine that was seen recently in clinic. He was diagnosed with cirrhosis and required an upper endoscopy (EGD) to assess esophageal varices. On May 16, 2018 he had an EGD which found large esophageal varices. Due to the extent in size of these esophageal varices and their potential for rupture, the patient is required to undergo a second urgent EGD in order to treat these esophageal varices.      The patient is scheduled for this urgent procedure on Wednesday May 23rd that will not enable him to go on his upcoming flight on 5/21/2018.  Given severity of patient's liver disease, the he cannot fly until this procedure is compete. Once the procedure is complete, he will be cleared to fly again.      Please let me know if you have any questions or concerns. You may also reach out to my nurse directly at [phone].      Clinic Staff - [phone] option 3      Sincerely,          KYLE Walker  [address], [id number]  [place]  [address].

## 2018-05-12 NOTE — LETTER
May 21, 2018       TO: Sylvain Pitts  [address]  [facility] [address]       Dear ,    To Whom It May Concern:     This is a letter of medical necessity for Mr. Pitts. Mr. Pitts is a patient of mine that was seen recently in clinic. He was diagnosed with cirrhosis and required an upper endoscopy (EGD) to assess esophageal varices. On May 16, 2018 he had an EGD which found large esophageal varices. Due to the extent in size of these esophageal varices and their potential for rupture, the patient is required to undergo a second urgent EGD in order to treat these esophageal varices.     The patient is scheduled for this urgent procedure on Wednesday May 23rd that will not enable him to go on his upcoming flight on 5/21/2018.  Given severity of patient's liver disease, the he cannot fly until this procedure is compete. Once the procedure is complete, he will be cleared to fly again.     Please let me know if you have any questions or concerns. You may also reach out to my nurse directly at [phone].     Clinic Staff - [phone] option 3     Sincerely,     KYLE Walker  [address], [id number]  [place]  [address].

## 2018-05-16 ENCOUNTER — DOCUMENTATION ONLY (OUTPATIENT)
Dept: GASTROENTEROLOGY | Facility: OUTPATIENT CENTER | Age: 38
End: 2018-05-16
Payer: COMMERCIAL

## 2018-05-16 ENCOUNTER — TRANSFERRED RECORDS (OUTPATIENT)
Dept: HEALTH INFORMATION MANAGEMENT | Facility: CLINIC | Age: 38
End: 2018-05-16

## 2018-05-16 LAB
MYCOBACTERIUM SPEC CULT: NORMAL
MYCOBACTERIUM SPEC CULT: NORMAL
SPECIMEN SOURCE: NORMAL

## 2018-05-17 ENCOUNTER — TELEPHONE (OUTPATIENT)
Dept: GASTROENTEROLOGY | Facility: CLINIC | Age: 38
End: 2018-05-17

## 2018-05-17 DIAGNOSIS — I85.10 SECONDARY ESOPHAGEAL VARICES WITHOUT BLEEDING (H): ICD-10-CM

## 2018-05-17 DIAGNOSIS — K70.11 ALCOHOLIC HEPATITIS WITH ASCITES (H): Primary | ICD-10-CM

## 2018-05-17 NOTE — TELEPHONE ENCOUNTER
[facility] Call Center    Phone Message    May a detailed message be left on voicemail: yes    Reason for Call: Other: Pt called to speak with Magdalena Damon about a procedure that was done yesterday, 5/16/18.  He needs to talk to her ASAP, as he a flight on Monday 5/21/18 that he has to change.     Action Taken: Other: [facility]

## 2018-05-18 ENCOUNTER — TELEPHONE (OUTPATIENT)
Dept: GASTROENTEROLOGY | Facility: CLINIC | Age: 38
End: 2018-05-18

## 2018-05-18 NOTE — TELEPHONE ENCOUNTER
[facility] Call Center    Phone Message    May a detailed message be left on voicemail: yes    Reason for Call: Other: Pt's sister Namrata calling to f/u on their request to get a form from Magdalena Damon for his flight on Monday. Writer informed Namrata that the request had been submitted to Magdalena for review. Namrata expressed understanding, states that if needed, the form could be emailed to her at [email or web address]. Please advise.     Action Taken: Message routed to:  [facility]

## 2018-05-18 NOTE — TELEPHONE ENCOUNTER
Patient scheduled for EGD    Indication for procedure. Alcoholic hepatitis with ascites     Referring Provider. Magdalena Damon PA-C    ? No     Arrival time verified? Patient to arrive at 2:25 pm     Facility location verified? [address]     Instructions given regarding prep and procedure. Transportation policy reviewed and verbalized understanding.     Prep Type NPO 8-8 hours prior     Are you taking any anticoagulants or blood thinners? Denies     Instructions given? Yes     Electronic implanted devices? Denies     Pre procedure teaching completed? Yes    Transportation from procedure? Yes, sister Namrata     H&P / Pre op physical completed? N/A    Henri Haney RN

## 2018-05-18 NOTE — TELEPHONE ENCOUNTER
Patient called back and confirmed ok to send email to patient's sister. LIZZIE on file did show email ok too. Sent email with Medical Necessity letter to [email or web address].

## 2018-05-21 ENCOUNTER — DOCUMENTATION ONLY (OUTPATIENT)
Dept: GASTROENTEROLOGY | Facility: CLINIC | Age: 38
End: 2018-05-21

## 2018-05-21 DIAGNOSIS — K70.31 ALCOHOLIC CIRRHOSIS OF LIVER WITH ASCITES (H): ICD-10-CM

## 2018-05-21 DIAGNOSIS — K70.11 ALCOHOLIC HEPATITIS WITH ASCITES (H): ICD-10-CM

## 2018-05-21 LAB
ALBUMIN SERPL-MCNC: 2.8 G/DL (ref 3.4–5)
ALP SERPL-CCNC: 129 U/L (ref 40–150)
ALT SERPL W P-5'-P-CCNC: 113 U/L (ref 0–70)
AST SERPL W P-5'-P-CCNC: 115 U/L (ref 0–45)
BILIRUB DIRECT SERPL-MCNC: 3.8 MG/DL (ref 0–0.2)
BILIRUB SERPL-MCNC: 7.1 MG/DL (ref 0.2–1.3)
PROT SERPL-MCNC: 8 G/DL (ref 6.8–8.8)

## 2018-05-21 NOTE — PROGRESS NOTES
Patient came into clinic this morning requesting further documentation and a more in depth letter of medical necessity for the airline his flight is booked through. Patient needing to change his flight to after he has a second EGD to treat his large esophageal varices and prevent rupture/GI bleed.     Letter and documents given to patient and he requested they be emailed to his sister Namrata Pitts @ [email or web address].    Email sent. Patient went down to lab to check his liver function as well.

## 2018-05-24 ENCOUNTER — HOSPITAL ENCOUNTER (OUTPATIENT)
Facility: CLINIC | Age: 38
Discharge: HOME | End: 2018-05-24
Attending: INTERNAL MEDICINE | Admitting: INTERNAL MEDICINE
Payer: COMMERCIAL

## 2018-05-24 ENCOUNTER — HOSPITAL ENCOUNTER (OUTPATIENT)
Facility: CLINIC | Age: 38
End: 2018-05-24
Attending: INTERNAL MEDICINE | Admitting: INTERNAL MEDICINE
Payer: COMMERCIAL

## 2018-05-24 ENCOUNTER — SURGERY (OUTPATIENT)
Age: 38
End: 2018-05-24

## 2018-05-24 VITALS
OXYGEN SATURATION: 97 % | DIASTOLIC BLOOD PRESSURE: 66 MMHG | SYSTOLIC BLOOD PRESSURE: 120 MMHG | RESPIRATION RATE: 9 BRPM

## 2018-05-24 DIAGNOSIS — I85.10 SECONDARY ESOPHAGEAL VARICES WITHOUT BLEEDING (H): Primary | ICD-10-CM

## 2018-05-24 DIAGNOSIS — K70.31 ALCOHOLIC CIRRHOSIS OF LIVER WITH ASCITES (H): ICD-10-CM

## 2018-05-24 LAB — UPPER GI ENDOSCOPY: NORMAL

## 2018-05-24 PROCEDURE — 43244 EGD VARICES LIGATION: CPT | Performed by: INTERNAL MEDICINE

## 2018-05-24 PROCEDURE — 25000125 ZZHC RX 250: Performed by: INTERNAL MEDICINE

## 2018-05-24 PROCEDURE — 25000128 H RX IP 250 OP 636: Performed by: INTERNAL MEDICINE

## 2018-05-24 PROCEDURE — 99153 MOD SED SAME PHYS/QHP EA: CPT | Performed by: INTERNAL MEDICINE

## 2018-05-24 PROCEDURE — G0500 MOD SEDAT ENDO SERVICE >5YRS: HCPCS | Performed by: INTERNAL MEDICINE

## 2018-05-24 RX ORDER — DIPHENHYDRAMINE HYDROCHLORIDE 50 MG/ML
INJECTION INTRAMUSCULAR; INTRAVENOUS PRN
Status: DISCONTINUED | OUTPATIENT
Start: 2018-05-24 | End: 2018-05-24 | Stop reason: HOSPADM

## 2018-05-24 RX ORDER — FENTANYL CITRATE 50 UG/ML
INJECTION, SOLUTION INTRAMUSCULAR; INTRAVENOUS PRN
Status: DISCONTINUED | OUTPATIENT
Start: 2018-05-24 | End: 2018-05-24 | Stop reason: HOSPADM

## 2018-05-24 RX ADMIN — MIDAZOLAM 1 MG: 1 INJECTION INTRAMUSCULAR; INTRAVENOUS at 13:59

## 2018-05-24 RX ADMIN — MIDAZOLAM 1 MG: 1 INJECTION INTRAMUSCULAR; INTRAVENOUS at 14:04

## 2018-05-24 RX ADMIN — FENTANYL CITRATE 50 MCG: 50 INJECTION, SOLUTION INTRAMUSCULAR; INTRAVENOUS at 13:58

## 2018-05-24 RX ADMIN — MIDAZOLAM 1 MG: 1 INJECTION INTRAMUSCULAR; INTRAVENOUS at 13:53

## 2018-05-24 RX ADMIN — FENTANYL CITRATE 50 MCG: 50 INJECTION, SOLUTION INTRAMUSCULAR; INTRAVENOUS at 13:53

## 2018-05-24 RX ADMIN — FENTANYL CITRATE 50 MCG: 50 INJECTION, SOLUTION INTRAMUSCULAR; INTRAVENOUS at 14:04

## 2018-05-24 RX ADMIN — FENTANYL CITRATE 100 MCG: 50 INJECTION, SOLUTION INTRAMUSCULAR; INTRAVENOUS at 13:49

## 2018-05-24 RX ADMIN — MIDAZOLAM 2 MG: 1 INJECTION INTRAMUSCULAR; INTRAVENOUS at 13:49

## 2018-05-24 RX ADMIN — DIPHENHYDRAMINE HYDROCHLORIDE 50 MG: 50 INJECTION, SOLUTION INTRAMUSCULAR; INTRAVENOUS at 13:45

## 2018-05-24 RX ADMIN — TOPICAL ANESTHETIC 2 SPRAY: 200 SPRAY DENTAL; PERIODONTAL at 13:45

## 2018-05-24 NOTE — DISCHARGE INSTRUCTIONS
Discharge Instructions after  Upper Endoscopy (EGD)    Activity and Diet  You were given medicine for pain. You may be dizzy or sleepy.  For 24 hours:    Do not drive or use heavy equipment.    Do not make important decisions.    Do not drink any alcohol.  _x__ You may return to your regular diet.    Discomfort  You may have a sore throat for 2 to 3 days. It may help to:    Avoid hot liquids for 24 hours.    Use sore throat lozenges.    Gargle as needed with salt water up to 4 times a day. Mix 1 cup of warm water  with 1 teaspoon of salt. Do not swallow.  ___ Your esophagus was dilated (opened) or banded during the exam:    Drink only cool liquids for the rest of the day. Eat a soft diet for the next few days.    You may have a sore chest for 2 to 3 days.    You may take Tylenol (acetaminophen) for pain unless your doctor has told you not to.    Do not take aspirin or ibuprofen (Advil, Motrin) or other NSAIDS  (anti-inflammatory drugs) for ___ days.    Follow-up  ___ We took small tissue samples for study. If you do not have a follow-up visit scheduled,  call your provider s office in 2 weeks for the results.    Other instructions________________________________________________________    When to call us:  Problems are rare. Call right away if you have:    Unusual throat pain or trouble swallowing    Unusual pain in belly or chest that is not relieved by belching or passing air    Black stools (tar-like looking bowel movement)    Temperature above 100.6  F. (37.5  C).    If you vomit blood or have severe pain, go to an emergency room.    If you have questions, call:  Monday to Friday, 7 a.m. to 4:30 p.m.: Endoscopy: [phone] (We may have to call you back)    After hours: Hospital: [phone] (Ask for the GI fellow on call)

## 2019-04-29 NOTE — DISCHARGE SUMMARY
[facility], [place]    Internal Medicine Discharge Summary- [facility]    Date of Admission:  3/14/2018  Date of Discharge:  3/16/2018  Discharging Attending Provider: Jarrod  Discharge Team: [facility]ara 2    Discharge Diagnoses   Alcoholic cirrhosis with ascites  Alcohol abuse    Follow-ups Needed After Discharge   Gastroenterology: F/u hepatitis labs    HPI  Sylvain Pitts is a 38 year old male with a past medical history significant for alcohol abuse, kidney stones, and anxiety presented to the ED with desire to detox.     Patient reports that [facility] with the interest in discussing alcohol cessation. He reports that following this visit, he was instructed to present to the [facility] ED for further evaluation. He reports that for the last 7 months he has been drinking heavily up to 15 shots of vodka or whiskey daily. He reports that he has also noticed yellowing of his skin and eyes fro the last three months. He denies having abdominal pain but he endorses abdominal distension as well. Denies having lower extremity swelling. He reports that he not had nausea. Or vomiting. He denies having blood in his stool or hematemesis. He reports that if he doesn't drink he develops bodily shakes He reports he has noticed increased red skin lesions. No fever or chills. He reports that he previously participated in alcohol detox.      Patient presented to the [facility] ED he was hemodynamically stable. He had an abdominal ultrasound which demonstrated cirrhotic liver as well as portal hypertension with retrograde flow in the spneic and main portal vein and ascites with splenomegaly. His labs were significant for elevated bilirubin of 14.4, elevated alk phos as well as a transaminitis.     Hospital Course   Sylvain Pitts was admitted on 3/14/2018 for new alcoholic cirrhosis with ascites.  The following problems were addressed during his  hospitalization:    # Alcoholic cirrhosis with ascites  MELD score 23. Hepatology was consulted. Abdominal ultrasound was completed showing cirrhosis and portal hypertension. No need for paracentesis. Will follow-up with hepatology as an outpatient.    # Alcohol abuse  Patient is ready to quit using alcohol. He has 15-20 drinks per day. He began to show signs of withdrawal and was transferred to detox.    Consultations This Hospital Stay   MEDICATION HISTORY IP PHARMACY CONSULT  GI HEPATOLOGY ADULT IP CONSULT  CHEMICAL DEPENDENCY IP CONSULT     Code Status   Full Code       The patient was discussed with Dr. Jarrod Salamanca Capp  [facility]  Pager: [phone]  ______________________________________________________________________    Physical Exam   Vital Signs: Temp: 99.3  F (37.4  C) Temp src: Oral BP: 126/75 Pulse: 115   Resp: 16 SpO2: 94 % O2 Device: None (Room air)    Weight: 223 lbs 4.8 oz    Weight: 223 lbs 4.8 oz  General Appearance: Resting comfortably in bed, no acute distress  Respiratory: Clear to auscultation bilaterally  Cardiovascular: Tachycardic, regular rhythm, normal S1/S2  GI: Abdomen distended, soft, not tender to palpation.  Skin: Jaundiced    Significant Results and Procedures   Most Recent 3 CBC's:  Recent Labs   Lab Test  03/15/18   0720  03/14/18   1837   WBC  6.6  8.1   HGB  11.2*  12.0*   MCV  112*  108*   PLT  66*  79*     Most Recent 3 BMP's:  Recent Labs   Lab Test  03/15/18   0720  03/14/18   1837   NA  139  140   POTASSIUM  3.7  4.0   CHLORIDE  106  106   CO2  19*  23   BUN  4*  4*   CR  0.53*  0.54*   ANIONGAP  14  11   JOHANN  8.0*  8.1*   GLC  107*  117*     Most Recent 2 LFT's:  Recent Labs   Lab Test  03/15/18   0720  03/14/18   1837   AST  199*  211*   ALT  59  59   ALKPHOS  157*  180*   BILITOTAL  14.4*  14.4*     Most Recent 3 INR's:  Recent Labs   Lab Test  03/15/18   0720  03/14/18   2235   INR  1.74*  1.78*   ,   Results for orders placed or performed during  the hospital encounter of 03/14/18   US Abdomen Complete w Doppler Complete    Narrative    US ABDOMEN COMPLETE WITH DOPPLER COMPLETE   3/14/2018 9:02 PM     COMPARISON: None.    HISTORY:  RUQ pain and abdominal distention.      FINDINGS: The liver demonstrates a coarse, hyperechoic echotexture  with a lobulated margin consistent with cirrhosis. There is retrograde  flow in the splenic, main portal and right portal veins. There is  antegrade flow in the left portal vein. The middle, left and right  hepatic veins are patent with antegrade flow. There is a small amount  of ascites around the liver.    The gallbladder is filled with fluid and sludge. There is uniform  gallbladder wall thickening likely related to ascites. There is no  pericholecystic fluid. There is no sonographic Cullen's sign. The  common duct is normal in size at 3 mm in diameter.    The pancreas is unremarkable.    The spleen is enlarged at 17.3 cm in length.    The right kidney measures 12.9 cm in length. The left kidney measures  14.8 cm in length. Renal echotexture bilaterally is within normal  limits. There is no hydronephrosis on either side.    The abdominal aorta and inferior vena cava are visualized.      Impression    IMPRESSION:  1. Cirrhotic appearing liver.  2. Findings consistent with portal hypertension including retrograde  flow in the splenic, main portal and right portal veins.  3. Sludge in the gallbladder. No evidence for acute cholecystitis.  4. Thickening of the gallbladder wall likely related to the presence  of ascites.  5. Splenomegaly.    STEFANY MACIAS MD       Pending Results   These results will be followed up by Hepatology.  Unresulted Labs Ordered in the Past 30 Days of this Admission     No orders found from 1/13/2018 to 3/15/2018.             Primary Care Physician   [facility]    Discharge Disposition   Discharged to rehabilitation facility  Condition at discharge: Good    Discharge Orders      GASTROENTEROLOGY ADULT REF CONSULT ONLY     General info for SNF   Length of Stay Estimate: Short Term Care: Estimated # of Days <30  Condition at Discharge: Stable  Level of care:board and care  Rehabilitation Potential: Excellent  Admission H&P remains valid and up-to-date: Yes  Recent Chemotherapy: N/A  Use Nursing Home Standing Orders: N/A     Mantoux instructions   Give two-step Mantoux (PPD) Per Facility Policy Yes     Reason for your hospital stay   Cirrhosis, alcohol cessation     Activity - Up ad lana     Full Code     Seizure precautions     Advance Diet as Tolerated   Follow this diet upon discharge: Orders Placed This Encounter     Regular Diet Adult       Discharge Medications   There are no discharge medications for this patient.    Allergies   No Known Allergies     (4) excellent

## 2022-07-07 NOTE — CONSULTS
Patient is on IR schedule 3/21/2018 for a dx thoracentesis.   Labs WNL for procedure.    No NPO required.  Consent will be done prior to procedure.     Please contact the IR charge RN at [phone] for estimated time of procedure.     Case discussed with Dr. Briones from IR and Dr. Raymond. Patient requires Syrian  so procedure timing will be based on their availability.     Verona Malhotra DNP, APRN  Interventional Radiology  Phone: [phone]  Pager: [phone]     Calm

## (undated) RX ORDER — DIPHENHYDRAMINE HYDROCHLORIDE 50 MG/ML
INJECTION INTRAMUSCULAR; INTRAVENOUS
Status: DISPENSED
Start: 2018-05-24

## (undated) RX ORDER — LIDOCAINE HYDROCHLORIDE 10 MG/ML
INJECTION, SOLUTION EPIDURAL; INFILTRATION; INTRACAUDAL; PERINEURAL
Status: DISPENSED
Start: 2018-03-21

## (undated) RX ORDER — FENTANYL CITRATE 50 UG/ML
INJECTION, SOLUTION INTRAMUSCULAR; INTRAVENOUS
Status: DISPENSED
Start: 2018-05-24

## (undated) RX ORDER — SIMETHICONE 20 MG/.3ML
EMULSION ORAL
Status: DISPENSED
Start: 2018-05-24